# Patient Record
Sex: MALE | Race: WHITE | NOT HISPANIC OR LATINO | Employment: FULL TIME | URBAN - METROPOLITAN AREA
[De-identification: names, ages, dates, MRNs, and addresses within clinical notes are randomized per-mention and may not be internally consistent; named-entity substitution may affect disease eponyms.]

---

## 2017-04-03 ENCOUNTER — RECORDS - HEALTHEAST (OUTPATIENT)
Dept: LAB | Facility: CLINIC | Age: 37
End: 2017-04-03

## 2017-04-03 LAB
CHOLEST SERPL-MCNC: 219 MG/DL
FASTING STATUS PATIENT QL REPORTED: ABNORMAL
HDLC SERPL-MCNC: 67 MG/DL
LDLC SERPL CALC-MCNC: 137 MG/DL
TRIGL SERPL-MCNC: 76 MG/DL

## 2018-08-03 ENCOUNTER — RECORDS - HEALTHEAST (OUTPATIENT)
Dept: LAB | Facility: CLINIC | Age: 38
End: 2018-08-03

## 2018-08-03 LAB
ALBUMIN SERPL-MCNC: 4.2 G/DL (ref 3.5–5)
ALP SERPL-CCNC: 83 U/L (ref 45–120)
ALT SERPL W P-5'-P-CCNC: 95 U/L (ref 0–45)
ANION GAP SERPL CALCULATED.3IONS-SCNC: 12 MMOL/L (ref 5–18)
AST SERPL W P-5'-P-CCNC: 53 U/L (ref 0–40)
BILIRUB SERPL-MCNC: 0.4 MG/DL (ref 0–1)
BUN SERPL-MCNC: 14 MG/DL (ref 8–22)
CALCIUM SERPL-MCNC: 9.6 MG/DL (ref 8.5–10.5)
CHLORIDE BLD-SCNC: 105 MMOL/L (ref 98–107)
CHOLEST SERPL-MCNC: 250 MG/DL
CO2 SERPL-SCNC: 23 MMOL/L (ref 22–31)
CREAT SERPL-MCNC: 0.8 MG/DL (ref 0.7–1.3)
FASTING STATUS PATIENT QL REPORTED: YES
GFR SERPL CREATININE-BSD FRML MDRD: >60 ML/MIN/1.73M2
GLUCOSE BLD-MCNC: 89 MG/DL (ref 70–125)
HDLC SERPL-MCNC: 66 MG/DL
LDLC SERPL CALC-MCNC: 131 MG/DL
POTASSIUM BLD-SCNC: 4.3 MMOL/L (ref 3.5–5)
PROT SERPL-MCNC: 7.7 G/DL (ref 6–8)
SODIUM SERPL-SCNC: 140 MMOL/L (ref 136–145)
TRIGL SERPL-MCNC: 265 MG/DL

## 2020-02-17 ENCOUNTER — RECORDS - HEALTHEAST (OUTPATIENT)
Dept: LAB | Facility: CLINIC | Age: 40
End: 2020-02-17

## 2020-02-17 LAB
ALBUMIN SERPL-MCNC: 4.7 G/DL (ref 3.5–5)
ALP SERPL-CCNC: 76 U/L (ref 45–120)
ALT SERPL W P-5'-P-CCNC: 148 U/L (ref 0–45)
ANION GAP SERPL CALCULATED.3IONS-SCNC: 10 MMOL/L (ref 5–18)
AST SERPL W P-5'-P-CCNC: 97 U/L (ref 0–40)
BILIRUB SERPL-MCNC: 0.7 MG/DL (ref 0–1)
BUN SERPL-MCNC: 12 MG/DL (ref 8–22)
CALCIUM SERPL-MCNC: 10.1 MG/DL (ref 8.5–10.5)
CHLORIDE BLD-SCNC: 100 MMOL/L (ref 98–107)
CHOLEST SERPL-MCNC: 228 MG/DL
CO2 SERPL-SCNC: 30 MMOL/L (ref 22–31)
CREAT SERPL-MCNC: 0.85 MG/DL (ref 0.7–1.3)
FASTING STATUS PATIENT QL REPORTED: YES
GFR SERPL CREATININE-BSD FRML MDRD: >60 ML/MIN/1.73M2
GLUCOSE BLD-MCNC: 88 MG/DL (ref 70–125)
HDLC SERPL-MCNC: 73 MG/DL
LDLC SERPL CALC-MCNC: 125 MG/DL
POTASSIUM BLD-SCNC: 4 MMOL/L (ref 3.5–5)
PROT SERPL-MCNC: 8 G/DL (ref 6–8)
SODIUM SERPL-SCNC: 140 MMOL/L (ref 136–145)
TRIGL SERPL-MCNC: 150 MG/DL

## 2021-06-01 ENCOUNTER — RECORDS - HEALTHEAST (OUTPATIENT)
Dept: LAB | Facility: CLINIC | Age: 41
End: 2021-06-01

## 2021-06-01 LAB
ALBUMIN SERPL-MCNC: 4.3 G/DL (ref 3.5–5)
ALP SERPL-CCNC: 108 U/L (ref 45–120)
ALT SERPL W P-5'-P-CCNC: 198 U/L (ref 0–45)
ANION GAP SERPL CALCULATED.3IONS-SCNC: 13 MMOL/L (ref 5–18)
AST SERPL W P-5'-P-CCNC: 187 U/L (ref 0–40)
BILIRUB SERPL-MCNC: 0.4 MG/DL (ref 0–1)
BUN SERPL-MCNC: 9 MG/DL (ref 8–22)
CALCIUM SERPL-MCNC: 9.4 MG/DL (ref 8.5–10.5)
CHLORIDE BLD-SCNC: 101 MMOL/L (ref 98–107)
CHOLEST SERPL-MCNC: 235 MG/DL
CO2 SERPL-SCNC: 22 MMOL/L (ref 22–31)
CREAT SERPL-MCNC: 0.81 MG/DL (ref 0.7–1.3)
FASTING STATUS PATIENT QL REPORTED: ABNORMAL
GFR SERPL CREATININE-BSD FRML MDRD: >60 ML/MIN/1.73M2
GLUCOSE BLD-MCNC: 128 MG/DL (ref 70–125)
HDLC SERPL-MCNC: 85 MG/DL
LDLC SERPL CALC-MCNC: 120 MG/DL
POTASSIUM BLD-SCNC: 3.6 MMOL/L (ref 3.5–5)
PROT SERPL-MCNC: 7.5 G/DL (ref 6–8)
SODIUM SERPL-SCNC: 136 MMOL/L (ref 136–145)
TRIGL SERPL-MCNC: 148 MG/DL

## 2021-10-05 ENCOUNTER — LAB REQUISITION (OUTPATIENT)
Dept: LAB | Facility: CLINIC | Age: 41
End: 2021-10-05

## 2021-10-05 DIAGNOSIS — R07.9 CHEST PAIN, UNSPECIFIED: ICD-10-CM

## 2021-10-05 DIAGNOSIS — R10.13 EPIGASTRIC PAIN: ICD-10-CM

## 2021-10-05 LAB
ALBUMIN SERPL-MCNC: 4.7 G/DL (ref 3.5–5)
ALP SERPL-CCNC: 100 U/L (ref 45–120)
ALT SERPL W P-5'-P-CCNC: 382 U/L (ref 0–45)
ANION GAP SERPL CALCULATED.3IONS-SCNC: 15 MMOL/L (ref 5–18)
AST SERPL W P-5'-P-CCNC: 355 U/L (ref 0–40)
BILIRUB SERPL-MCNC: 0.6 MG/DL (ref 0–1)
BUN SERPL-MCNC: 9 MG/DL (ref 8–22)
CALCIUM SERPL-MCNC: 10.4 MG/DL (ref 8.5–10.5)
CHLORIDE BLD-SCNC: 93 MMOL/L (ref 98–107)
CO2 SERPL-SCNC: 25 MMOL/L (ref 22–31)
CREAT SERPL-MCNC: 0.91 MG/DL (ref 0.7–1.3)
GFR SERPL CREATININE-BSD FRML MDRD: >90 ML/MIN/1.73M2
GLUCOSE BLD-MCNC: 160 MG/DL (ref 70–125)
LIPASE SERPL-CCNC: 25 U/L (ref 0–52)
POTASSIUM BLD-SCNC: 4.4 MMOL/L (ref 3.5–5)
PROT SERPL-MCNC: 8.6 G/DL (ref 6–8)
SODIUM SERPL-SCNC: 133 MMOL/L (ref 136–145)

## 2021-10-05 PROCEDURE — 82947 ASSAY GLUCOSE BLOOD QUANT: CPT | Performed by: PHYSICIAN ASSISTANT

## 2021-10-05 PROCEDURE — 83690 ASSAY OF LIPASE: CPT | Performed by: PHYSICIAN ASSISTANT

## 2022-02-23 ENCOUNTER — HOSPITAL ENCOUNTER (INPATIENT)
Facility: HOSPITAL | Age: 42
LOS: 2 days | Discharge: HOME | End: 2022-02-25
Attending: EMERGENCY MEDICINE | Admitting: INTERNAL MEDICINE
Payer: COMMERCIAL

## 2022-02-23 ENCOUNTER — APPOINTMENT (OUTPATIENT)
Dept: RADIOLOGY | Facility: HOSPITAL | Age: 42
End: 2022-02-23
Attending: EMERGENCY MEDICINE
Payer: COMMERCIAL

## 2022-02-23 DIAGNOSIS — F10.939 ALCOHOL WITHDRAWAL SYNDROME WITH COMPLICATION (H): ICD-10-CM

## 2022-02-23 DIAGNOSIS — E87.1 HYPONATREMIA: ICD-10-CM

## 2022-02-23 DIAGNOSIS — K21.9 GASTROESOPHAGEAL REFLUX DISEASE WITHOUT ESOPHAGITIS: Primary | ICD-10-CM

## 2022-02-23 DIAGNOSIS — N17.9 ACUTE KIDNEY INJURY (H): ICD-10-CM

## 2022-02-23 LAB
ALBUMIN SERPL-MCNC: 4.3 G/DL (ref 3.5–5)
ALBUMIN UR-MCNC: NEGATIVE MG/DL
ALP SERPL-CCNC: 73 U/L (ref 45–120)
ALT SERPL W P-5'-P-CCNC: 193 U/L (ref 0–45)
ANION GAP SERPL CALCULATED.3IONS-SCNC: 17 MMOL/L (ref 5–18)
APPEARANCE UR: CLEAR
AST SERPL W P-5'-P-CCNC: 140 U/L (ref 0–40)
BILIRUB DIRECT SERPL-MCNC: 0.3 MG/DL
BILIRUB SERPL-MCNC: 0.9 MG/DL (ref 0–1)
BILIRUB UR QL STRIP: NEGATIVE
BUN SERPL-MCNC: 25 MG/DL (ref 8–22)
CALCIUM SERPL-MCNC: 9.8 MG/DL (ref 8.5–10.5)
CHLORIDE BLD-SCNC: 90 MMOL/L (ref 98–107)
CO2 SERPL-SCNC: 20 MMOL/L (ref 22–31)
COLOR UR AUTO: COLORLESS
CREAT SERPL-MCNC: 1.89 MG/DL (ref 0.7–1.3)
ERYTHROCYTE [DISTWIDTH] IN BLOOD BY AUTOMATED COUNT: 11.8 % (ref 10–15)
ETHANOL SERPL-MCNC: 69 MG/DL
GFR SERPL CREATININE-BSD FRML MDRD: 45 ML/MIN/1.73M2
GLUCOSE BLD-MCNC: 101 MG/DL (ref 70–125)
GLUCOSE UR STRIP-MCNC: NEGATIVE MG/DL
HCT VFR BLD AUTO: 41.7 % (ref 40–53)
HGB BLD-MCNC: 14.3 G/DL (ref 13.3–17.7)
HGB UR QL STRIP: NEGATIVE
KETONES UR STRIP-MCNC: ABNORMAL MG/DL
LEUKOCYTE ESTERASE UR QL STRIP: NEGATIVE
LIPASE SERPL-CCNC: 16 U/L (ref 0–52)
MAGNESIUM SERPL-MCNC: 2 MG/DL (ref 1.8–2.6)
MCH RBC QN AUTO: 31 PG (ref 26.5–33)
MCHC RBC AUTO-ENTMCNC: 34.3 G/DL (ref 31.5–36.5)
MCV RBC AUTO: 91 FL (ref 78–100)
NITRATE UR QL: NEGATIVE
PH UR STRIP: 6.5 [PH] (ref 5–7)
PHOSPHATE SERPL-MCNC: 3.6 MG/DL (ref 2.5–4.5)
PLATELET # BLD AUTO: 246 10E3/UL (ref 150–450)
POTASSIUM BLD-SCNC: 3.6 MMOL/L (ref 3.5–5)
PROT SERPL-MCNC: 8.3 G/DL (ref 6–8)
RBC # BLD AUTO: 4.61 10E6/UL (ref 4.4–5.9)
RBC URINE: 0 /HPF
SODIUM SERPL-SCNC: 127 MMOL/L (ref 136–145)
SP GR UR STRIP: 1.01 (ref 1–1.03)
TROPONIN I SERPL-MCNC: <0.01 NG/ML (ref 0–0.29)
TSH SERPL DL<=0.005 MIU/L-ACNC: 0.72 UIU/ML (ref 0.3–5)
UROBILINOGEN UR STRIP-MCNC: <2 MG/DL
WBC # BLD AUTO: 6.5 10E3/UL (ref 4–11)
WBC URINE: <1 /HPF

## 2022-02-23 PROCEDURE — 250N000011 HC RX IP 250 OP 636: Performed by: EMERGENCY MEDICINE

## 2022-02-23 PROCEDURE — 99223 1ST HOSP IP/OBS HIGH 75: CPT | Performed by: INTERNAL MEDICINE

## 2022-02-23 PROCEDURE — 258N000003 HC RX IP 258 OP 636: Performed by: INTERNAL MEDICINE

## 2022-02-23 PROCEDURE — HZ2ZZZZ DETOXIFICATION SERVICES FOR SUBSTANCE ABUSE TREATMENT: ICD-10-PCS | Performed by: INTERNAL MEDICINE

## 2022-02-23 PROCEDURE — 84100 ASSAY OF PHOSPHORUS: CPT | Performed by: INTERNAL MEDICINE

## 2022-02-23 PROCEDURE — 84484 ASSAY OF TROPONIN QUANT: CPT | Performed by: EMERGENCY MEDICINE

## 2022-02-23 PROCEDURE — 81001 URINALYSIS AUTO W/SCOPE: CPT | Performed by: EMERGENCY MEDICINE

## 2022-02-23 PROCEDURE — 96374 THER/PROPH/DIAG INJ IV PUSH: CPT

## 2022-02-23 PROCEDURE — 82248 BILIRUBIN DIRECT: CPT | Performed by: EMERGENCY MEDICINE

## 2022-02-23 PROCEDURE — 85027 COMPLETE CBC AUTOMATED: CPT | Performed by: EMERGENCY MEDICINE

## 2022-02-23 PROCEDURE — 83735 ASSAY OF MAGNESIUM: CPT | Performed by: INTERNAL MEDICINE

## 2022-02-23 PROCEDURE — 36415 COLL VENOUS BLD VENIPUNCTURE: CPT | Performed by: EMERGENCY MEDICINE

## 2022-02-23 PROCEDURE — 250N000009 HC RX 250: Performed by: EMERGENCY MEDICINE

## 2022-02-23 PROCEDURE — 120N000001 HC R&B MED SURG/OB

## 2022-02-23 PROCEDURE — 82077 ASSAY SPEC XCP UR&BREATH IA: CPT | Performed by: EMERGENCY MEDICINE

## 2022-02-23 PROCEDURE — 258N000003 HC RX IP 258 OP 636: Performed by: EMERGENCY MEDICINE

## 2022-02-23 PROCEDURE — 96375 TX/PRO/DX INJ NEW DRUG ADDON: CPT

## 2022-02-23 PROCEDURE — 71046 X-RAY EXAM CHEST 2 VIEWS: CPT

## 2022-02-23 PROCEDURE — 83690 ASSAY OF LIPASE: CPT | Performed by: EMERGENCY MEDICINE

## 2022-02-23 PROCEDURE — 250N000013 HC RX MED GY IP 250 OP 250 PS 637: Performed by: EMERGENCY MEDICINE

## 2022-02-23 PROCEDURE — 250N000013 HC RX MED GY IP 250 OP 250 PS 637: Performed by: INTERNAL MEDICINE

## 2022-02-23 PROCEDURE — 93005 ELECTROCARDIOGRAM TRACING: CPT | Performed by: EMERGENCY MEDICINE

## 2022-02-23 PROCEDURE — 84443 ASSAY THYROID STIM HORMONE: CPT | Performed by: EMERGENCY MEDICINE

## 2022-02-23 PROCEDURE — C9803 HOPD COVID-19 SPEC COLLECT: HCPCS

## 2022-02-23 PROCEDURE — 99285 EMERGENCY DEPT VISIT HI MDM: CPT | Mod: 25

## 2022-02-23 PROCEDURE — 96361 HYDRATE IV INFUSION ADD-ON: CPT

## 2022-02-23 RX ORDER — ATORVASTATIN CALCIUM 40 MG/1
40 TABLET, FILM COATED ORAL DAILY
Status: DISCONTINUED | OUTPATIENT
Start: 2022-02-24 | End: 2022-02-25 | Stop reason: HOSPADM

## 2022-02-23 RX ORDER — FLUMAZENIL 0.1 MG/ML
0.2 INJECTION, SOLUTION INTRAVENOUS
Status: DISCONTINUED | OUTPATIENT
Start: 2022-02-23 | End: 2022-02-25 | Stop reason: HOSPADM

## 2022-02-23 RX ORDER — FOLIC ACID 1 MG/1
1 TABLET ORAL DAILY
Status: DISCONTINUED | OUTPATIENT
Start: 2022-02-24 | End: 2022-02-25 | Stop reason: HOSPADM

## 2022-02-23 RX ORDER — LORAZEPAM 2 MG/ML
1 INJECTION INTRAMUSCULAR ONCE
Status: COMPLETED | OUTPATIENT
Start: 2022-02-23 | End: 2022-02-23

## 2022-02-23 RX ORDER — LORAZEPAM 2 MG/ML
1-2 INJECTION INTRAMUSCULAR EVERY 30 MIN PRN
Status: DISCONTINUED | OUTPATIENT
Start: 2022-02-23 | End: 2022-02-25 | Stop reason: HOSPADM

## 2022-02-23 RX ORDER — ACETAMINOPHEN 650 MG/1
650 SUPPOSITORY RECTAL EVERY 6 HOURS PRN
Status: DISCONTINUED | OUTPATIENT
Start: 2022-02-23 | End: 2022-02-25 | Stop reason: HOSPADM

## 2022-02-23 RX ORDER — ONDANSETRON 2 MG/ML
4 INJECTION INTRAMUSCULAR; INTRAVENOUS EVERY 6 HOURS PRN
Status: DISCONTINUED | OUTPATIENT
Start: 2022-02-23 | End: 2022-02-25 | Stop reason: HOSPADM

## 2022-02-23 RX ORDER — GABAPENTIN 300 MG/1
900 CAPSULE ORAL EVERY 8 HOURS
Status: DISCONTINUED | OUTPATIENT
Start: 2022-02-24 | End: 2022-02-25 | Stop reason: HOSPADM

## 2022-02-23 RX ORDER — LIDOCAINE 40 MG/G
CREAM TOPICAL
Status: DISCONTINUED | OUTPATIENT
Start: 2022-02-23 | End: 2022-02-25 | Stop reason: HOSPADM

## 2022-02-23 RX ORDER — CLONIDINE HYDROCHLORIDE 0.1 MG/1
0.1 TABLET ORAL EVERY 8 HOURS
Status: DISCONTINUED | OUTPATIENT
Start: 2022-02-23 | End: 2022-02-25 | Stop reason: HOSPADM

## 2022-02-23 RX ORDER — OLANZAPINE 5 MG/1
5-10 TABLET, ORALLY DISINTEGRATING ORAL EVERY 6 HOURS PRN
Status: DISCONTINUED | OUTPATIENT
Start: 2022-02-23 | End: 2022-02-25 | Stop reason: HOSPADM

## 2022-02-23 RX ORDER — FOLIC ACID 1 MG/1
1 TABLET ORAL ONCE
Status: COMPLETED | OUTPATIENT
Start: 2022-02-23 | End: 2022-02-23

## 2022-02-23 RX ORDER — LORAZEPAM 1 MG/1
1-2 TABLET ORAL EVERY 30 MIN PRN
Status: DISCONTINUED | OUTPATIENT
Start: 2022-02-23 | End: 2022-02-25 | Stop reason: HOSPADM

## 2022-02-23 RX ORDER — GABAPENTIN 300 MG/1
1200 CAPSULE ORAL ONCE
Status: COMPLETED | OUTPATIENT
Start: 2022-02-23 | End: 2022-02-23

## 2022-02-23 RX ORDER — ONDANSETRON 4 MG/1
4 TABLET, ORALLY DISINTEGRATING ORAL ONCE
Status: COMPLETED | OUTPATIENT
Start: 2022-02-23 | End: 2022-02-23

## 2022-02-23 RX ORDER — HALOPERIDOL 5 MG/ML
2.5-5 INJECTION INTRAMUSCULAR EVERY 6 HOURS PRN
Status: DISCONTINUED | OUTPATIENT
Start: 2022-02-23 | End: 2022-02-25 | Stop reason: HOSPADM

## 2022-02-23 RX ORDER — SODIUM CHLORIDE 9 MG/ML
INJECTION, SOLUTION INTRAVENOUS CONTINUOUS
Status: ACTIVE | OUTPATIENT
Start: 2022-02-23 | End: 2022-02-24

## 2022-02-23 RX ORDER — GABAPENTIN 300 MG/1
600 CAPSULE ORAL EVERY 8 HOURS
Status: DISCONTINUED | OUTPATIENT
Start: 2022-02-27 | End: 2022-02-25 | Stop reason: HOSPADM

## 2022-02-23 RX ORDER — MULTIPLE VITAMINS W/ MINERALS TAB 9MG-400MCG
1 TAB ORAL DAILY
Status: DISCONTINUED | OUTPATIENT
Start: 2022-02-23 | End: 2022-02-25 | Stop reason: HOSPADM

## 2022-02-23 RX ORDER — GABAPENTIN 100 MG/1
100 CAPSULE ORAL EVERY 8 HOURS
Status: DISCONTINUED | OUTPATIENT
Start: 2022-03-03 | End: 2022-02-25 | Stop reason: HOSPADM

## 2022-02-23 RX ORDER — LORAZEPAM 0.5 MG/1
1 TABLET ORAL ONCE
Status: COMPLETED | OUTPATIENT
Start: 2022-02-23 | End: 2022-02-23

## 2022-02-23 RX ORDER — GABAPENTIN 300 MG/1
300 CAPSULE ORAL EVERY 8 HOURS
Status: DISCONTINUED | OUTPATIENT
Start: 2022-03-01 | End: 2022-02-25 | Stop reason: HOSPADM

## 2022-02-23 RX ORDER — ONDANSETRON 4 MG/1
4 TABLET, ORALLY DISINTEGRATING ORAL EVERY 6 HOURS PRN
Status: DISCONTINUED | OUTPATIENT
Start: 2022-02-23 | End: 2022-02-25 | Stop reason: HOSPADM

## 2022-02-23 RX ORDER — ACETAMINOPHEN 325 MG/1
650 TABLET ORAL EVERY 6 HOURS PRN
Status: DISCONTINUED | OUTPATIENT
Start: 2022-02-23 | End: 2022-02-25 | Stop reason: HOSPADM

## 2022-02-23 RX ORDER — ONDANSETRON 4 MG/1
4 TABLET, FILM COATED ORAL EVERY 6 HOURS PRN
COMMUNITY
End: 2024-07-24

## 2022-02-23 RX ADMIN — Medication 100 MG: at 15:43

## 2022-02-23 RX ADMIN — GABAPENTIN 1200 MG: 300 CAPSULE ORAL at 18:59

## 2022-02-23 RX ADMIN — SODIUM CHLORIDE 1000 ML: 9 INJECTION, SOLUTION INTRAVENOUS at 13:35

## 2022-02-23 RX ADMIN — LORAZEPAM 1 MG: 0.5 TABLET ORAL at 15:43

## 2022-02-23 RX ADMIN — FOLIC ACID 1 MG: 1 TABLET ORAL at 15:43

## 2022-02-23 RX ADMIN — ONDANSETRON 4 MG: 4 TABLET, ORALLY DISINTEGRATING ORAL at 13:31

## 2022-02-23 RX ADMIN — CLONIDINE HYDROCHLORIDE 0.1 MG: 0.1 TABLET ORAL at 18:58

## 2022-02-23 RX ADMIN — FAMOTIDINE 20 MG: 10 INJECTION, SOLUTION INTRAVENOUS at 13:28

## 2022-02-23 RX ADMIN — SODIUM CHLORIDE: 9 INJECTION, SOLUTION INTRAVENOUS at 18:11

## 2022-02-23 RX ADMIN — LORAZEPAM 1 MG: 2 INJECTION INTRAMUSCULAR; INTRAVENOUS at 16:43

## 2022-02-23 RX ADMIN — Medication 1 TABLET: at 18:58

## 2022-02-23 ASSESSMENT — ACTIVITIES OF DAILY LIVING (ADL)
ADLS_ACUITY_SCORE: 4
DEPENDENT_IADLS:: INDEPENDENT
ADLS_ACUITY_SCORE: 8
ADLS_ACUITY_SCORE: 4
ADLS_ACUITY_SCORE: 8

## 2022-02-23 NOTE — ED PROVIDER NOTES
ED Triage Provider Note  [facility]  Encounter Date: Feb 23, 2022    History:  Chief Complaint   Patient presents with     Nausea, Vomiting, & Diarrhea     Chandler Branch is a 41 year old male who presents to the ED with nausea, vomiting. Was seen at , sent here with MARY, hyponatremia. Give IVF, zofran. Mild acid reflux pain. etoh 2 days ago, does not feel like in withdrawals.       Review of Systems:  See HPI    Exam:  /71   Pulse (!) 124   Temp 97.7  F (36.5  C) (Temporal)   Resp 16   Wt 93 kg (205 lb)   SpO2 99%   BMI 27.05 kg/m    General: No acute distress. Appears stated age. Anxious, speaks rapidly  Cardio: Tachycardic rate, extremities well perfused  Resp: Normal work of breathing, grossly normal respiratory rate  Neuro: Alert. CN II-XII grossly intact. Grossly intact strength.       Medical Decision Making:  Patient arriving to the ED with problem as above. A medical screening exam was performed. Orders initiated from Triage. The patient is appropriate to wait in triage.    Initial DDx includes: MARY, gastroenteritis, etoh gastritis, etoh withdrawal, hyperthyroidism    Yuniel Seth MD on 2/23/2022 at 12:47 PM     Yuniel Seth MD  02/23/22 9867

## 2022-02-23 NOTE — ED TRIAGE NOTES
Pt c/o nausea and vomiting for past 3 days. Not able to eat food. Dizziness. Some shortness of breath (negative COVID test at urgency room) Pt has IV placed from urgency room, fluids, 4mg of zofran.

## 2022-02-23 NOTE — ED PROVIDER NOTES
EMERGENCY DEPARTMENT ENCOUNTER      NAME: Chandler Branch  AGE: 41 year old male  YOB: 1980  MRN: [id number]  EVALUATION DATE & TIME: No admission date for patient encounter.    PCP: Mame Dougherty    ED PROVIDER: Renay Otero M.D.      Chief Complaint   Patient presents with     Nausea, Vomiting, & Diarrhea         FINAL IMPRESSION:  1. Hyponatremia    2. Acute kidney injury (H)    3. Alcohol withdrawal syndrome with complication (H)          ED COURSE & MEDICAL DECISION MAKING:    ED Course as of 02/23/22 1528   Wed Feb 23, 2022   1512 Pt with creatinine improving from 2.7 to 1.89 after 2L NS bolus and Na from 120 to 127, LFTs elevated with Tbili normal 0.9 with likely EtOH related stress. EKG completed and reassuringly WNL without ST changes, no prior available for comparison purposes and with COVID19 PCR underway. CXR pending, hospitalist paged to admit with hyponatremia and MARY with EtOH and epigastric pain/gastritis, pt received zofran/pepcid here in ED, IV ativan with elevated BP, tremulousness and some tachycardia with early EtOH withdrawal likely   1527 Pt endorsed to hospitalist Dr Jones to med surg       Pertinent Labs & Imaging studies reviewed. (See chart for details)  2:37 PM I met with patient for initial interview and encounter.   3:25 PM I spoke to hospitalist Dr. Jones regarding plan for admission.    N95 worn      At the conclusion of the encounter I discussed the results of all of the tests and the disposition. The questions were answered. The patient or family acknowledged understanding and was agreeable with the care plan.     MEDICATIONS GIVEN IN THE EMERGENCY:  Medications   thiamine (B-1) tablet 100 mg (has no administration in time range)   folic acid (FOLVITE) tablet 1 mg (has no administration in time range)   LORazepam (ATIVAN) tablet 1 mg (has no administration in time range)   LORazepam (ATIVAN) injection 1 mg (has no administration in time range)   0.9% sodium chloride  BOLUS (1,000 mLs Intravenous New Bag 2/23/22 1335)   ondansetron (ZOFRAN-ODT) ODT tab 4 mg (4 mg Oral Given 2/23/22 1331)   famotidine (PEPCID) injection 20 mg (20 mg Intravenous Given 2/23/22 1328)       NEW PRESCRIPTIONS STARTED AT TODAY'S ER VISIT  New Prescriptions    No medications on file          =================================================================    HPI      Chandler Branch is a 41 year old male with PMHx of HTN, HLD, alcohol use, former smoker who presents to the ED today via walk-in with nausea, vomiting.    Per chart review, patient was seen at [facility] earlier today for vomiting x2 days. Laboratory work was concerning with evidence for acute kidney injury and hyponatremia compared to lab results reviewed from December of 2020. He was referred to this ED for IV hydration and possible admission.     Patient reports he has had 3 days of intermittent epigastric and substernal burning chest pain. States that he has been lightheaded with this, and had one episode of non-bloody vomiting 2 days ago. He denies any significant cardiac history or history of DVT. He was found to have acute MARY and hyponatremia at  earlier today and was sent to this ED due to lack of direct admit beds.       REVIEW OF SYSTEMS   All other systems reviewed and are negative except as noted above in HPI.    PAST MEDICAL HISTORY:  History reviewed. No pertinent past medical history.    PAST SURGICAL HISTORY:  Past Surgical History:   Procedure Laterality Date     BACK SURGERY       IR CERVICAL EPIDURAL STEROID INJECTION  4/21/2016       CURRENT MEDICATIONS:    acetaminophen (TYLENOL) 325 MG tablet  atorvastatin (LIPITOR) 40 MG tablet  HYDROXYZINE HCL ORAL  lisinopril-hydrochlorothiazide (PRINZIDE,ZESTORETIC) 10-12.5 mg per tablet  naproxen (NAPROSYN) 250 MG tablet  traMADol (ULTRAM) 50 mg tablet        ALLERGIES:  No Known Allergies    FAMILY HISTORY:  Family History   Problem Relation Age of Onset     Coronary  Artery Disease Paternal Grandfather        SOCIAL HISTORY:   Social History     Socioeconomic History     Marital status: Single     Spouse name: Not on file     Number of children: Not on file     Years of education: Not on file     Highest education level: Not on file   Occupational History     Not on file   Tobacco Use     Smoking status: Never Smoker     Smokeless tobacco: Not on file   Substance and Sexual Activity     Alcohol use: Yes     Alcohol/week: 7.0 standard drinks     Comment: Alcoholic Drinks/day: One drink a day     Drug use: Not on file     Sexual activity: Not on file   Other Topics Concern     Not on file   Social History Narrative     Not on file     Social Determinants of Health     Financial Resource Strain: Not on file   Food Insecurity: Not on file   Transportation Needs: Not on file   Physical Activity: Not on file   Stress: Not on file   Social Connections: Not on file   Intimate Partner Violence: Not on file   Housing Stability: Not on file       VITALS:  Patient Vitals for the past 24 hrs:   BP Temp Temp src Pulse Resp SpO2 Weight   02/23/22 1430 (!) 140/70 -- -- -- -- 100 % --   02/23/22 1246 -- -- -- -- -- -- 93 kg (205 lb)   02/23/22 1241 132/71 97.7  F (36.5  C) Temporal (!) 124 16 99 % --       PHYSICAL EXAM    GENERAL: Awake, alert.  In no acute distress.   HEENT: Normocephalic, atraumatic.  Pupils equal, round and reactive.  Conjunctiva normal.  EOMI.  NECK: No stridor or apparent deformity.  PULMONARY: Symmetrical breath sounds without distress.  Lungs clear to auscultation bilaterally without wheezes, rhonchi or rales.  CARDIO: Regular rate and rhythm.  No significant murmur, rub or gallop.  Radial pulses strong and symmetrical.  ABDOMINAL: Abdomen soft, non-distended and non-tender to palpation.  No CVAT, no palpable hepatosplenomegaly.  EXTREMITIES: No lower extremity swelling or edema.    NEURO: Alert and oriented to person, place and time.  Cranial nerves grossly intact.  No  focal motor deficit.  PSYCH: Normal mood and affect  SKIN: No rashes      LAB:  All pertinent labs reviewed and interpreted.  Results for orders placed or performed during the hospital encounter of 02/23/22   UA with Microscopic reflex to Culture    Specimen: Urine, Clean Catch   Result Value Ref Range    Color Urine Colorless Colorless, Straw, Light Yellow, Yellow    Appearance Urine Clear Clear    Glucose Urine Negative Negative mg/dL    Bilirubin Urine Negative Negative    Ketones Urine Trace (A) Negative mg/dL    Specific Gravity Urine 1.010 1.001 - 1.030    Blood Urine Negative Negative    pH Urine 6.5 5.0 - 7.0    Protein Albumin Urine Negative Negative mg/dL    Urobilinogen Urine <2.0 <2.0 mg/dL    Nitrite Urine Negative Negative    Leukocyte Esterase Urine Negative Negative    RBC Urine 0 <=2 /HPF    WBC Urine <1 <=5 /HPF   TSH with free T4 reflex   Result Value Ref Range    TSH 0.72 0.30 - 5.00 uIU/mL   Result Value Ref Range    Lipase 16 0 - 52 U/L   Hepatic function panel   Result Value Ref Range    Bilirubin Total 0.9 0.0 - 1.0 mg/dL    Bilirubin Direct 0.3 <=0.5 mg/dL    Protein Total 8.3 (H) 6.0 - 8.0 g/dL    Albumin 4.3 3.5 - 5.0 g/dL    Alkaline Phosphatase 73 45 - 120 U/L     (H) 0 - 40 U/L     (H) 0 - 45 U/L   CBC (+ platelets, no diff)   Result Value Ref Range    WBC Count 6.5 4.0 - 11.0 10e3/uL    RBC Count 4.61 4.40 - 5.90 10e6/uL    Hemoglobin 14.3 13.3 - 17.7 g/dL    Hematocrit 41.7 40.0 - 53.0 %    MCV 91 78 - 100 fL    MCH 31.0 26.5 - 33.0 pg    MCHC 34.3 31.5 - 36.5 g/dL    RDW 11.8 10.0 - 15.0 %    Platelet Count 246 150 - 450 10e3/uL   Basic metabolic panel   Result Value Ref Range    Sodium 127 (L) 136 - 145 mmol/L    Potassium 3.6 3.5 - 5.0 mmol/L    Chloride 90 (L) 98 - 107 mmol/L    Carbon Dioxide (CO2) 20 (L) 22 - 31 mmol/L    Anion Gap 17 5 - 18 mmol/L    Urea Nitrogen 25 (H) 8 - 22 mg/dL    Creatinine 1.89 (H) 0.70 - 1.30 mg/dL    Calcium 9.8 8.5 - 10.5 mg/dL     Glucose 101 70 - 125 mg/dL    GFR Estimate 45 (L) >60 mL/min/1.73m2   Result Value Ref Range    Troponin I <0.01 0.00 - 0.29 ng/mL       RADIOLOGY:  Reviewed all pertinent imaging. Please see official radiology report.  XR Chest 2 Views    (Results Pending)         EKG:    Reviewed and interpreted as:   2/23/2022 1454  96 bpm  Normal sinus rhythm  No STEMI  Normal EGC      I have independently reviewed and interpreted the EKG(s) documented above.        I, Brandin Vincent, am serving as a scribe to document services personally performed by Dr. Renay Otero based on my observation and the provider's statements to me. I, Renay Otero MD attest that Brandin Vincent is acting in a scribe capacity, has observed my performance of the services and has documented them in accordance with my direction.         Renay Otero MD  02/23/22 0830

## 2022-02-23 NOTE — H&P
Admission History and Physical   Chandler Branch,  1980, MRN [id number]      Alcohol withdrawal syndrome with complication (H) [F10.820]    PCP: Mame Dougherty, [unfilled], [phone]   Code status:  No Order       Extended Emergency Contact Information  Primary Emergency Contact: Andrés Branch  Address: [facility] [address] [place] [address]  Home Phone: [phone]  Relation: Father  Secondary Emergency Contact: Erwin Branch   [place]  Mobile Phone: [phone]  Relation: Other  Mother: BERNADINE BRANCH  Home Phone: [phone]       Assessment and Plan     Assessment:  41-year-old with untreated GERD who had alcohol and dietary indiscretion presented with nausea vomiting.  He was unable to take p.o. fluids or food.  Labs in the emergency room showed hyponatremia and MARY.  Patient is feeling better after IV fluids in the ER in the urgency room.    Acute hyponatremia likely due to hypovolemia from nausea and vomiting  --Initially presented with sodium level of 121 in the urgency room  --Improved to 127 after IV fluids  --Continue normal saline at 75 cc an hour    MARY  --Nonoliguric  --Initial creatinine greater than 2 in the urgency room.  Repeat creatinine of 1.8  --Hold ACE inhibitor or other nephrotoxic substances  --Continue gentle IV hydration    Nausea and vomiting likely due to untreated GERD worsened with alcohol intake  --Order IV PPI  --Order gentle IV hydration and clear liquid diet.  Advance as tolerated    Alcohol use  -Blood alcohol level of 69  --Order CIWA protocol    Elevated LFT likely due to alcohol intake  --Anticipate trending down with IV fluids    Heartburn  --No evidence of ACS  --Anticipate improvement with PPI.  Discharged on PPI      Plan:    Pt would be admitted as inpatient and will likely stay for greater than 2 midnights.    Precautions: Alcohol withdrawal    Nutrition: Clear liquid and advance as tolerated    Activity: As tolerated    IV fluids: Normal  saline at 75    Antibiotics: None    DVT prophylaxis: None    GI prophylaxis: PPI    Consult: None    Monitor: Alcohol withdrawal    Labs: Magnesium, sodium and creatinine    Imaging: None      Total unit/floor time 70 minutes. Greater than 50% was spent in counseling with the patient on discussions regarding GERD.      Chief Complaint:  Nausea vomiting, heartburn 3 days     HPI:    Informant is patient reliable  Chandler Branch is a 41 year old old male with history of hypertension on lisinopril, GERD untreated with PPI which has been going on for 1 year.  Patient had dietary indiscretion with friends on Saturday with 4 already drinks.  On Monday he started having nausea and vomiting and is unable to keep anything down.  He claims that in couple of days he had 7 bouts of nonbloody emesis.  He also had a heartburn.  He complained of left quadrant upper abdominal pain which was 5 out of 10 in severity.  He was not able to have a bowel movement while passing gas.  He was no problem with urination.  He was seen emergency room and was diagnosed with hyponatremia and MARY and sent to ER.  He received 1 L of IV fluids in the emergency room and 1 L of fluid in the ER.  Patient is feeling significantly better.  Patient claims that he has had problems about a year ago but has been decreasing his alcohol intake.  His father would like his liver function to be evaluated due to past alcohol intake.  Patient claims that on the weekends he drinks 3-4 hard drinks but on the weekdays 1 or 2/day.  He has not had about alcohol withdrawal tremors or seizures.  Patient takes Pepto-Bismol as needed for acid reflux.  He works as a  for [employer] and the .                     Medical History      As mentioned above   Family History  Reviewed, and family history includes Coronary Artery Disease in his paternal grandfather.          Allergies  No Known Allergies Social History  Reviewed, and he  reports that he has never  smoked. He does not have any smokeless tobacco history on file. He reports current alcohol use of about 7.0 standard drinks of alcohol per week.    Review of Systems:  10-point ROS negative, except as noted in HPI            Prior to Admission Medications   (Not in a hospital admission)         Physical Exam:  Temp:  [97.7  F (36.5  C)] 97.7  F (36.5  C)  Pulse:  [111-124] 111  Resp:  [16] 16  BP: (132-145)/(70-79) 145/79  SpO2:  [98 %-100 %] 98 %  Wt Readings from Last 5 Encounters:   02/23/22 93 kg (205 lb)     Body mass index is 27.05 kg/m .  Alert, oriented*3  Appears anxious  No pallor, icterus, clubbing, cyanosis  Well-nourished  No sinus tenderness  Moist mucus membranes  Neck supple, but thin  CVS: S1 S2-N, no murmurs, gallops, rubs  Resp: B/L vesicular breath sounds, no wheezing, crackles   Abd: soft, No t/g/r  Neuro: No tremors of the outstretched hand   Vasc: no leg edema  No clubbing  Skin--no generalized skin rash     Pertinent Labs  Lab Results:  Recent Results (from the past 24 hour(s))   COVID-19 VIRUS (CORONAVIRUS) BY PCR (EXTERNAL RESULT)    Collection Time: 02/23/22 10:57 AM   Result Value Ref Range    COVID-19 Virus by PCR (External Result) Negative Negative   TSH with free T4 reflex    Collection Time: 02/23/22  1:28 PM   Result Value Ref Range    TSH 0.72 0.30 - 5.00 uIU/mL   Alcohol level blood    Collection Time: 02/23/22  1:28 PM   Result Value Ref Range    Alcohol, Blood 69 (H) None detected mg/dL   Lipase    Collection Time: 02/23/22  1:28 PM   Result Value Ref Range    Lipase 16 0 - 52 U/L   Hepatic function panel    Collection Time: 02/23/22  1:28 PM   Result Value Ref Range    Bilirubin Total 0.9 0.0 - 1.0 mg/dL    Bilirubin Direct 0.3 <=0.5 mg/dL    Protein Total 8.3 (H) 6.0 - 8.0 g/dL    Albumin 4.3 3.5 - 5.0 g/dL    Alkaline Phosphatase 73 45 - 120 U/L     (H) 0 - 40 U/L     (H) 0 - 45 U/L   CBC (+ platelets, no diff)    Collection Time: 02/23/22  1:28 PM   Result Value  Ref Range    WBC Count 6.5 4.0 - 11.0 10e3/uL    RBC Count 4.61 4.40 - 5.90 10e6/uL    Hemoglobin 14.3 13.3 - 17.7 g/dL    Hematocrit 41.7 40.0 - 53.0 %    MCV 91 78 - 100 fL    MCH 31.0 26.5 - 33.0 pg    MCHC 34.3 31.5 - 36.5 g/dL    RDW 11.8 10.0 - 15.0 %    Platelet Count 246 150 - 450 10e3/uL   Basic metabolic panel    Collection Time: 02/23/22  1:28 PM   Result Value Ref Range    Sodium 127 (L) 136 - 145 mmol/L    Potassium 3.6 3.5 - 5.0 mmol/L    Chloride 90 (L) 98 - 107 mmol/L    Carbon Dioxide (CO2) 20 (L) 22 - 31 mmol/L    Anion Gap 17 5 - 18 mmol/L    Urea Nitrogen 25 (H) 8 - 22 mg/dL    Creatinine 1.89 (H) 0.70 - 1.30 mg/dL    Calcium 9.8 8.5 - 10.5 mg/dL    Glucose 101 70 - 125 mg/dL    GFR Estimate 45 (L) >60 mL/min/1.73m2   Troponin I    Collection Time: 02/23/22  1:28 PM   Result Value Ref Range    Troponin I <0.01 0.00 - 0.29 ng/mL   UA with Microscopic reflex to Culture    Collection Time: 02/23/22  1:35 PM    Specimen: Urine, Clean Catch   Result Value Ref Range    Color Urine Colorless Colorless, Straw, Light Yellow, Yellow    Appearance Urine Clear Clear    Glucose Urine Negative Negative mg/dL    Bilirubin Urine Negative Negative    Ketones Urine Trace (A) Negative mg/dL    Specific Gravity Urine 1.010 1.001 - 1.030    Blood Urine Negative Negative    pH Urine 6.5 5.0 - 7.0    Protein Albumin Urine Negative Negative mg/dL    Urobilinogen Urine <2.0 <2.0 mg/dL    Nitrite Urine Negative Negative    Leukocyte Esterase Urine Negative Negative    RBC Urine 0 <=2 /HPF    WBC Urine <1 <=5 /HPF     No results found for: HGBA1C  No results found for: IRON  Lab Results   Component Value Date    TROPONINI <0.01 02/23/2022     Lab Results   Component Value Date    TSH 0.72 02/23/2022       Pertinent Radiology  Radiology Results:  XR Chest 2 Views    Result Date: 2/23/2022  EXAM: XR CHEST 2 VW LOCATION: [facility] DATE/TIME: 2/23/2022 3:25 PM INDICATION: chest pain COMPARISON:  08/23/2004     IMPRESSION: Cervical fusion hardware. Heart size appears normal. Lungs appear clear.      EKG Results: not reviewed.   Echo: No results found.

## 2022-02-23 NOTE — CONSULTS
"Care Management Initial Consult    General Information  Assessment completed with: Chandler Hansen  Type of CM/SW Visit: Initial Assessment    Primary Care Provider verified and updated as needed: Yes   Readmission within the last 30 days: no previous admission in last 30 days      Reason for Consult: discharge planning  Advance Care Planning: Advance Care Planning Reviewed: other (see comments) (\"I don't have one\")          Communication Assessment  Patient's communication style: spoken language (English or Bilingual)    Hearing Difficulty or Deaf: no   Wear Glasses or Blind: yes    Cognitive  Cognitive/Neuro/Behavioral:                        Living Environment:   People in home: child(dorina), dependent (\"kids: daughter 13, twins: daughter 10 and son 10.\")     Current living Arrangements: house      Able to return to prior arrangements: yes       Family/Social Support:  Care provided by: self  Provides care for: child(dorina) (\"kids: daughter 13, twins: daughter 10 and son 10.\")     Parent(s) (\"father\")          Description of Support System: Supportive, Involved    Support Assessment: Adequate family and caregiver support, Adequate social supports, Patient communicates needs well met    Current Resources:   Patient receiving home care services: No     Community Resources: None  Equipment currently used at home: none  Supplies currently used at home: Other (\"glasses for driving or the TV\")    Employment/Financial:  Employment Status: employed full-time, employed part-time (\"I have a full time job and part time job\")     Employment/ Comments: \"I still work at the [employer] and for the [employer]\"  Financial Concerns:             Lifestyle & Psychosocial Needs:  Social Determinants of Health     Tobacco Use: Unknown     Smoking Tobacco Use: Never Smoker     Smokeless Tobacco Use: Unknown   Alcohol Use: Not on file   Financial Resource Strain: Not on file   Food Insecurity: Not on file   Transportation Needs: Not on file " "  Physical Activity: Not on file   Stress: Not on file   Social Connections: Not on file   Intimate Partner Violence: Not on file   Depression: Not on file   Housing Stability: Not on file       Functional Status:  Prior to admission patient needed assistance:   Dependent ADLs:: Independent, Ambulation-no assistive device  Dependent IADLs:: Independent  Assesssment of Functional Status: At functional baseline    Mental Health Status:          Chemical Dependency Status:  Chemical Dependency Status: Current Concern             Values/Beliefs:  Spiritual, Cultural Beliefs, Uatsdin Practices, Values that affect care:                 Additional Information:  Chandler lives in a house with his kids. His kids are: daughter 13, twins: daughter 10 and son 10. His \"father is staying with the kids while he is in the hospital\".    He is independent with ADLs at baseline and drives. He works 2 jobs, 1 full time and 1 part time. He works at the [employer] and as a . He tells me \"he is not using his VA insurance\".    Likely no discharge needs at this time.    Plans to drive himself home. His car is in the parking lot.    Sahra Gaona RN      "

## 2022-02-23 NOTE — ED NOTES
[facility] ED Handoff Report    ED Chief Complaint: nausea/vomiting    ED Diagnosis:  (E87.1) Hyponatremia  Comment: The patient came to the ER for frequent vomiting with discomfort/pain in the upper left quadrant. Now, the patient feels much better--no nausea or vomiting. VSS  Plan: Observation     (N17.9) Acute kidney injury (H)  Comment: Monitor  Plan: Monitor    (F10.239) Alcohol withdrawal syndrome with complication (H)  Comment: The patient's ETOH level at 1328 was 69. The patient shows no signs of ETOH withdrawal.   Plan: monitor for withdrawal.        PMH:  History reviewed. No pertinent past medical history.     Code Status:  No Order     Falls Risk: No Band: Not applicable    Current Living Situation/Residence: lives in a house     Elimination Status: Continent: Yes     Activity Level: Independent    Patients Preferred Language:  English     Needed: No    Vital Signs:  BP (!) 145/79   Pulse 111   Temp 97.7  F (36.5  C) (Temporal)   Resp 16   Wt 93 kg (205 lb)   SpO2 98%   BMI 27.05 kg/m       Cardiac Rhythm: regular    Pain Score: 1/10    Is the Patient Confused:  No    Last Food or Drink: 02/23/22 at   Today at 1000 hours    Focused Assessment:  The patient states his abdominal discomfort/pain is nearly resolved. The patient has a history of ETOH abuse and states his last drink was 2 days ago.     Tests Performed: Done: Labs    Treatments Provided:  Fluid and ativan    Family Dynamics/Concerns: No    Family Updated On Visitor Policy: Yes    Plan of Care Communicated to Family: Yes    Who Was Updated about Plan of Care: The patient's father    Belongings Checklist Done and Signed by Patient: Yes    Medications sent with patient: N/A    Covid: asymptomatic , negative    Additional Information: The patient is calm and cooperative. The patient states his last drink of alcohol was two days ago.     RN: [id number] Turner 2/23/2022 4:47 PM

## 2022-02-23 NOTE — ED NOTES
Expected Patient Referral to ED  11:31 AM    Referring Clinic/Provider:  Dr. August urgency room [facility]    Reason for referral/Clinical facts:  Patient presenting to the urgency room with intestinal illness type symptoms.  Was found to be hyponatremic and in acute renal failure.  Attempts were made to directly admit the patient but due to capacity issues at the hospital that was unsuccessful and the patient was referred to the emergency department for additional stabilization pending admission and bed availability.    Recommendations provided:  Send to ED for further evaluation    Caller was informed that this institution does possess the capabilities and/or resources to provide for patient and should be transferred to our facility.    Discussed that if direct admit is sought and any hurdles are encountered, this ED would be happy to see the patient and evaluate.    Informed caller that recommendations provided are recommendations based only on the facts provided and that they responsible to accept or reject the advice, or to seek a formal in person consultation as needed and that this ED will see/treat patient should they arrive.      Jhoan Ambrosio MD  Emergency Medicine  [facility] EMERGENCY DEPARTMENT  [address] [place] [id number] [phone]     Jhoan Ambrosio MD  02/23/22 5991

## 2022-02-24 ENCOUNTER — APPOINTMENT (OUTPATIENT)
Dept: ULTRASOUND IMAGING | Facility: HOSPITAL | Age: 42
End: 2022-02-24
Attending: INTERNAL MEDICINE
Payer: COMMERCIAL

## 2022-02-24 LAB
ALBUMIN SERPL-MCNC: 3.8 G/DL (ref 3.5–5)
ALP SERPL-CCNC: 61 U/L (ref 45–120)
ALT SERPL W P-5'-P-CCNC: 201 U/L (ref 0–45)
ANION GAP SERPL CALCULATED.3IONS-SCNC: 11 MMOL/L (ref 5–18)
AST SERPL W P-5'-P-CCNC: 190 U/L (ref 0–40)
ATRIAL RATE - MUSE: 96 BPM
BILIRUB SERPL-MCNC: 1.2 MG/DL (ref 0–1)
BUN SERPL-MCNC: 18 MG/DL (ref 8–22)
CALCIUM SERPL-MCNC: 9.2 MG/DL (ref 8.5–10.5)
CHLORIDE BLD-SCNC: 98 MMOL/L (ref 98–107)
CO2 SERPL-SCNC: 23 MMOL/L (ref 22–31)
CREAT SERPL-MCNC: 1.17 MG/DL (ref 0.7–1.3)
DIASTOLIC BLOOD PRESSURE - MUSE: NORMAL MMHG
GFR SERPL CREATININE-BSD FRML MDRD: 80 ML/MIN/1.73M2
GLUCOSE BLD-MCNC: 90 MG/DL (ref 70–125)
INTERPRETATION ECG - MUSE: NORMAL
P AXIS - MUSE: 62 DEGREES
POTASSIUM BLD-SCNC: 3.8 MMOL/L (ref 3.5–5)
PR INTERVAL - MUSE: 152 MS
PROT SERPL-MCNC: 7.3 G/DL (ref 6–8)
QRS DURATION - MUSE: 100 MS
QT - MUSE: 342 MS
QTC - MUSE: 432 MS
R AXIS - MUSE: 33 DEGREES
SODIUM SERPL-SCNC: 132 MMOL/L (ref 136–145)
SYSTOLIC BLOOD PRESSURE - MUSE: NORMAL MMHG
T AXIS - MUSE: 40 DEGREES
VENTRICULAR RATE- MUSE: 96 BPM

## 2022-02-24 PROCEDURE — 250N000013 HC RX MED GY IP 250 OP 250 PS 637: Performed by: INTERNAL MEDICINE

## 2022-02-24 PROCEDURE — 36415 COLL VENOUS BLD VENIPUNCTURE: CPT | Performed by: INTERNAL MEDICINE

## 2022-02-24 PROCEDURE — C9113 INJ PANTOPRAZOLE SODIUM, VIA: HCPCS | Performed by: INTERNAL MEDICINE

## 2022-02-24 PROCEDURE — 258N000003 HC RX IP 258 OP 636: Performed by: INTERNAL MEDICINE

## 2022-02-24 PROCEDURE — 82040 ASSAY OF SERUM ALBUMIN: CPT | Performed by: INTERNAL MEDICINE

## 2022-02-24 PROCEDURE — 120N000001 HC R&B MED SURG/OB

## 2022-02-24 PROCEDURE — 250N000011 HC RX IP 250 OP 636: Performed by: INTERNAL MEDICINE

## 2022-02-24 PROCEDURE — 76705 ECHO EXAM OF ABDOMEN: CPT

## 2022-02-24 PROCEDURE — 80053 COMPREHEN METABOLIC PANEL: CPT | Performed by: INTERNAL MEDICINE

## 2022-02-24 PROCEDURE — 99232 SBSQ HOSP IP/OBS MODERATE 35: CPT | Performed by: INTERNAL MEDICINE

## 2022-02-24 RX ORDER — DOCUSATE SODIUM 100 MG/1
100 CAPSULE, LIQUID FILLED ORAL 2 TIMES DAILY
Status: DISCONTINUED | OUTPATIENT
Start: 2022-02-25 | End: 2022-02-25 | Stop reason: HOSPADM

## 2022-02-24 RX ADMIN — GABAPENTIN 900 MG: 300 CAPSULE ORAL at 09:58

## 2022-02-24 RX ADMIN — GABAPENTIN 900 MG: 300 CAPSULE ORAL at 19:07

## 2022-02-24 RX ADMIN — GABAPENTIN 900 MG: 300 CAPSULE ORAL at 01:48

## 2022-02-24 RX ADMIN — PANTOPRAZOLE SODIUM 40 MG: 40 INJECTION, POWDER, FOR SOLUTION INTRAVENOUS at 07:43

## 2022-02-24 RX ADMIN — Medication 5 MG: at 21:14

## 2022-02-24 RX ADMIN — CLONIDINE HYDROCHLORIDE 0.1 MG: 0.1 TABLET ORAL at 01:48

## 2022-02-24 RX ADMIN — CLONIDINE HYDROCHLORIDE 0.1 MG: 0.1 TABLET ORAL at 09:58

## 2022-02-24 RX ADMIN — Medication 100 MG: at 09:58

## 2022-02-24 RX ADMIN — SODIUM CHLORIDE: 9 INJECTION, SOLUTION INTRAVENOUS at 07:47

## 2022-02-24 RX ADMIN — ACETAMINOPHEN 650 MG: 325 TABLET ORAL at 21:14

## 2022-02-24 RX ADMIN — FOLIC ACID 1 MG: 1 TABLET ORAL at 09:58

## 2022-02-24 RX ADMIN — CLONIDINE HYDROCHLORIDE 0.1 MG: 0.1 TABLET ORAL at 19:08

## 2022-02-24 RX ADMIN — Medication 1 TABLET: at 09:58

## 2022-02-24 RX ADMIN — ATORVASTATIN CALCIUM 40 MG: 40 TABLET, FILM COATED ORAL at 09:58

## 2022-02-24 ASSESSMENT — ACTIVITIES OF DAILY LIVING (ADL)
ADLS_ACUITY_SCORE: 4

## 2022-02-24 NOTE — PLAN OF CARE
Problem: Alcohol Withdrawal  Goal: Alcohol Withdrawal Symptom Control  Outcome: Ongoing, Progressing     Problem: Renal Function Impairment (Acute Kidney Injury/Impairment)  Goal: Effective Renal Function  Outcome: Ongoing, Progressing  Intervention: Monitor and Support Renal Function  Recent Flowsheet Documentation  Taken 2/24/2022 0337 by Yogesh Cunningham RN  Medication Review/Management: medications reviewed  Taken 2/23/2022 1984 by Yogesh Cunningham RN  Medication Review/Management: medications reviewed     Problem: Fluid and Electrolyte Imbalance (Acute Kidney Injury/Impairment)  Goal: Fluid and Electrolyte Balance  Outcome: Ongoing, Progressing    Patient reports very good sleep overnight. Denies pain and nausea. Scoring 4 on CIWA scale. Somewhat higher than normal activity and a mild, visible tremor noted with arms extended. IVF continuing per orders. Repeat labs this AM.    Yogesh Cunningham RN

## 2022-02-24 NOTE — PLAN OF CARE
Problem: Plan of Care - These are the overarching goals to be used throughout the patient stay.    Goal: Optimal Comfort and Wellbeing  Outcome: Ongoing, Progressing     Problem: Alcohol Withdrawal  Goal: Alcohol Withdrawal Symptom Control  Outcome: Ongoing, Progressing        Pt's CIWA scores were 5 and 3 this evening.  He reports mild anxiety, restlessness, and has mild tremor.  Denies having any nausea or headache.  He states that he is feeling much better now compared with how he felt prior to arrival to hospital.  Continues to report gastritis pain with swallowing but says this has also improved.

## 2022-02-24 NOTE — PROGRESS NOTES
[facility]    Medicine Progress Note - Hospitalist Service    Date of Admission:  2/23/2022    Assessment & Plan            41-year-old with untreated GERD who had alcohol and dietary indiscretion presented with nausea vomiting.  He was unable to take p.o. fluids or food.  Labs in the emergency room showed hyponatremia and MARY.  Patient is feeling better after IV fluids in the ER in the urgency room.      2/24 :       Patient has Ongoing alcohol abuse and last drink was on Tuesday  Scoring low on CIWA  Has some nausea   Patient requesting for ultrasound of liver/gall bladder and will order : moderate hepatic steatosis      Discharge in am if no alcohol w/d and tolerating diet        A/p :            Acute hyponatremia likely due to hypovolemia from nausea and vomiting  --Initially presented with sodium level of 121 in the urgency room  --Improved to 127 after IV fluids  --Continue normal saline at 75 cc an hour       MARY  --Nonoliguric  --Initial creatinine greater than 2 in the urgency room.  Repeat creatinine of 1.8  --Hold ACE inhibitor or other nephrotoxic substances  --Continue gentle IV hydration       Nausea and vomiting likely due to untreated GERD worsened with alcohol intake  --Order IV PPI  --Order gentle IV hydration and clear liquid diet.  Advance as tolerated       Alcohol use  -Blood alcohol level of 69  --Order CIWA protocol       Elevated LFT likely due to alcohol intake  --Anticipate trending down with IV fluids       Heartburn  --No evidence of ACS  --Anticipate improvement with PPI.  Discharged on PPI               Diet: Full Liquid Diet    DVT Prophylaxis: Pneumatic Compression Devices  Mancia Catheter: Not present  Central Lines: None  Cardiac Monitoring: None  Code Status: Full Code      Disposition Plan   Expected Discharge: 02/25/2022     Anticipated discharge location: home with family    Delays: tolerate oral diet, ultrasound RUQ       The patient's care was  "discussed with the Bedside Nurse and Patient.    Mika Fritz MD  Hospitalist Service  [facility]  Securely message with the [employer] Web Console (learn more here)  Text page via [employer] Paging/Directory         Clinically Significant Risk Factors Present on Admission         # Hyponatremia: Na = 132 mmol/L (Ref range: 136 - 145 mmol/L) on admission, will monitor as appropriate         # Overweight: Estimated body mass index is 25.77 kg/m  as calculated from the following:    Height as of this encounter: 1.88 m (6' 2\").    Weight as of this encounter: 91 kg (200 lb 11.2 oz).      ______________________________________________________________________      Data reviewed today: I reviewed all medications, new labs and imaging results over the last 24 hours. I personally reviewed no images or EKG's today.    Physical Exam   Vital Signs: Temp: 98  F (36.7  C) Temp src: Oral BP: 101/57 Pulse: 75   Resp: 18 SpO2: 98 % O2 Device: None (Room air)    Weight: 200 lbs 11.2 oz         GENERAL: The patient is not in any acute distressed. Awake and alert.  HEENT: Nonicteric sclerae, PERRLA, EOMI. Oropharynx clear. Moist mucous membranes. Conjunctivae appear well perfused.  HEART: Regular rate and rhythm without murmurs.  LUNGS: Clear to auscultation bilaterally. No wheezing or crackles.  ABDOMEN: Soft, positive bowel sounds, nontender.  SKIN: No rash, no excessive bruising, petechiae, or purpura.  EXTREMITIES : no rashes, no swelling in legs.  NEUROLOGIC: conscious and oriented, follows commands, no obvious focal deficits.  ROS: All other systems negative       Data   Recent Labs   Lab 02/24/22  0637 02/23/22  1328   WBC  --  6.5   HGB  --  14.3   MCV  --  91   PLT  --  246   * 127*   POTASSIUM 3.8 3.6   CHLORIDE 98 90*   CO2 23 20*   BUN 18 25*   CR 1.17 1.89*   ANIONGAP 11 17   NICKI 9.2 9.8   GLC 90 101   ALBUMIN 3.8 4.3   PROTTOTAL 7.3 8.3*   BILITOTAL 1.2* 0.9   ALKPHOS 61 73   * 193*   AST " 190* 140*   LIPASE  --  16

## 2022-02-24 NOTE — PLAN OF CARE
Problem: Plan of Care - These are the overarching goals to be used throughout the patient stay.    Goal: Optimal Comfort and Wellbeing  Outcome: Ongoing, Progressing   Goal Outcome Evaluation:  Pt denies pain.  Independent in room.  Currently NPO for ultrasound ordered for abdomen.  6:15 tabletime for tonight.    Problem: Plan of Care - These are the overarching goals to be used throughout the patient stay.    Goal: Absence of Hospital-Acquired Illness or Injury  Intervention: Identify and Manage Fall Risk  Recent Flowsheet Documentation  Taken 2/24/2022 0749 by Cyndee Zepeda, RN  Safety Promotion/Fall Prevention:   activity supervised   assistive device/personal items within reach  Independent in room.  Steady on feet.  Pt scoring a 2 and a 1 on CIWA protocol.  Anxious to go home to children and to be able to go back to work.  Intervention: Prevent Skin Injury  Recent Flowsheet Documentation  Taken 2/24/2022 0749 by Cyndee eZpeda, RN  Body Position: position changed independently  Intervention: Prevent and Manage VTE (Venous Thromboembolism) Risk  Recent Flowsheet Documentation  Taken 2/24/2022 0749 by Cyndee Zepeda, RN  Activity Management:   up ad hanna   activity adjusted per tolerance   activity encouraged   ambulated in room     Problem: Renal Function Impairment (Acute Kidney Injury/Impairment)  Goal: Effective Renal Function  Intervention: Monitor and Support Renal Function  Recent Flowsheet Documentation  Taken 2/24/2022 0749 by Cyndee Zepeda, RN  Medication Review/Management: medications reviewed  IV fluids are currently being given at 75/hr.

## 2022-02-25 VITALS
HEART RATE: 68 BPM | TEMPERATURE: 97.8 F | DIASTOLIC BLOOD PRESSURE: 72 MMHG | HEIGHT: 74 IN | SYSTOLIC BLOOD PRESSURE: 114 MMHG | BODY MASS INDEX: 25.76 KG/M2 | RESPIRATION RATE: 18 BRPM | WEIGHT: 200.7 LBS | OXYGEN SATURATION: 97 %

## 2022-02-25 PROCEDURE — 99239 HOSP IP/OBS DSCHRG MGMT >30: CPT | Performed by: INTERNAL MEDICINE

## 2022-02-25 PROCEDURE — 250N000013 HC RX MED GY IP 250 OP 250 PS 637: Performed by: FAMILY MEDICINE

## 2022-02-25 PROCEDURE — 250N000011 HC RX IP 250 OP 636: Performed by: INTERNAL MEDICINE

## 2022-02-25 PROCEDURE — C9113 INJ PANTOPRAZOLE SODIUM, VIA: HCPCS | Performed by: INTERNAL MEDICINE

## 2022-02-25 PROCEDURE — 250N000013 HC RX MED GY IP 250 OP 250 PS 637: Performed by: INTERNAL MEDICINE

## 2022-02-25 RX ORDER — OMEPRAZOLE 20 MG/1
20 TABLET, DELAYED RELEASE ORAL DAILY
Qty: 90 TABLET | Refills: 0 | Status: SHIPPED | OUTPATIENT
Start: 2022-02-25 | End: 2022-05-26

## 2022-02-25 RX ADMIN — PANTOPRAZOLE SODIUM 40 MG: 40 INJECTION, POWDER, FOR SOLUTION INTRAVENOUS at 08:55

## 2022-02-25 RX ADMIN — Medication 1 TABLET: at 08:54

## 2022-02-25 RX ADMIN — DOCUSATE SODIUM 100 MG: 100 CAPSULE, LIQUID FILLED ORAL at 08:54

## 2022-02-25 RX ADMIN — FOLIC ACID 1 MG: 1 TABLET ORAL at 08:54

## 2022-02-25 RX ADMIN — CLONIDINE HYDROCHLORIDE 0.1 MG: 0.1 TABLET ORAL at 09:01

## 2022-02-25 RX ADMIN — CLONIDINE HYDROCHLORIDE 0.1 MG: 0.1 TABLET ORAL at 02:43

## 2022-02-25 RX ADMIN — GABAPENTIN 900 MG: 300 CAPSULE ORAL at 09:00

## 2022-02-25 RX ADMIN — Medication 100 MG: at 08:53

## 2022-02-25 RX ADMIN — GABAPENTIN 900 MG: 300 CAPSULE ORAL at 02:43

## 2022-02-25 RX ADMIN — ATORVASTATIN CALCIUM 40 MG: 40 TABLET, FILM COATED ORAL at 08:54

## 2022-02-25 ASSESSMENT — ACTIVITIES OF DAILY LIVING (ADL)
ADLS_ACUITY_SCORE: 4

## 2022-02-25 NOTE — PLAN OF CARE
Problem: Alcohol Withdrawal  Goal: Alcohol Withdrawal Symptom Control  Outcome: Ongoing, Progressing    Problem: Oral Intake Inadequate (Acute Kidney Injury/Impairment)  Goal: Optimal Nutrition Intake  Outcome: Ongoing, Progressing     Problem: Fluid and Electrolyte Imbalance (Acute Kidney Injury/Impairment)  Goal: Fluid and Electrolyte Balance  Outcome: Ongoing, Progressing     Patient slept well overnight. Scoring 0 on CIWA scale. Oral intake good. Tolerating regular diet. Patient reports he feels significantly improved and is eager to discharge home today.    Yogesh Cunningham RN

## 2022-02-25 NOTE — PLAN OF CARE
Patient is tolerating regular diet. Pt. Denies nausea.    CIWA scores this shift=0.    Tylenol X 1 for headache with relief.    Pt. Would like to discharge tomorrow ASAP. Sticky note left for MD to let them know.

## 2022-02-25 NOTE — DISCHARGE SUMMARY
[facility]  Hospitalist Discharge Summary      Date of Admission:  2/23/2022  Date of Discharge:  2/25/2022  Discharging Provider: Luigi Gil MD  Discharge Service: Hospitalist Service    Discharge Diagnoses   Alcohol dependence  Nausea and vomiting, resolved   GERD  Hyponatremia, resolved   Hyperlipidemia  Hypertension    Follow-ups Needed After Discharge   Follow-up Appointments     Follow-up and recommended labs and tests       Follow up with primary care provider, Mame Dougherty, within 7 days for   hospital follow- up.  The following labs/tests are recommended: BMP in 2   to 3 days.             Unresulted Labs Ordered in the Past 30 Days of this Admission     No orders found from 1/24/2022 to 2/24/2022.      These results will be followed up by PCP    Discharge Disposition   Discharged to home  Condition at discharge: Stable    Hospital Course     41-year-old with past medical history of hypertension, hyperlipidemia, GERD, alcohol dependence who presented with nausea vomiting.  He was unable to take p.o. fluids or food.  Labs in the emergency room showed hyponatremia and MARY.  Please refer to H&P for details.    Acute hyponatremia   - likely due to hypovolemia from nausea and vomiting  - Initially presented with sodium level of 121 in the urgency room  - Improved with IV fluid support  - Sodium is up to 132 before discharge.  - Continue to monitor sodium level as outpatient    Nausea and vomiting  - Secondary to alcohol dependence  - Resolved   - Patient tolerated regular diet before discharge    Alcohol dependence  - Patient presented with alcohol level of 69  - Started on CIWA protocol  - No sign or symptom of alcohol withdrawal  - Social service consult for chemical dependency, patient declined    GERD  - Patient presented with heartburn  - Probably secondary to alcohol abuse  - Started on a PPI    Hypertension  - Hold home medication on admission because of MARY and  hyponatremia  - Restart lisinopril/HCTZ on discharge  - Continue to monitor blood pressure as outpatient    Hyperlipidemia  - Resume statin as LFTs are improving    Consultations This Hospital Stay   SOCIAL WORK IP CONSULT    Code Status   Full Code    Time Spent on this Encounter   ILuigi MD, personally saw the patient today and spent greater than 30 minutes discharging this patient.       Luigi Gil MD  [address] [address]  Phone: [phone]  Fax: [phone]  ______________________________________________________________________    Physical Exam   Vital Signs: Temp: 97.8  F (36.6  C) Temp src: Oral BP: 114/72 Pulse: 68   Resp: 18 SpO2: 97 % O2 Device: None (Room air)    Weight: 200 lbs 11.2 oz  General Appearance: Sleeping comfortably in bed in no acute distress  Respiratory: Clear to auscultation bilaterally  Cardiovascular: Normal heart sounds, no murmur.  GI: Soft, normal bowel sounds.  No tenderness.  Skin: Dry warm, no rash.  Other: Alert, awake, oriented x3.  No focal deficit.       Primary Care Physician   Mame Dougherty    Discharge Orders      Reason for your hospital stay    Alcohol dependence, hyponatremia     Follow-up and recommended labs and tests     Follow up with primary care provider, Mame Dougherty, within 7 days for hospital follow- up.  The following labs/tests are recommended: BMP in 2 to 3 days.     Activity    Your activity upon discharge: activity as tolerated     Diet    Follow this diet upon discharge: Orders Placed This Encounter      Regular Diet Adult       Significant Results and Procedures   Most Recent 3 CBC's:Recent Labs   Lab Test 02/23/22  1328   WBC 6.5   HGB 14.3   MCV 91        Most Recent 3 BMP's:Recent Labs   Lab Test 02/24/22  0637 02/23/22  1328 10/05/21  1608   * 127* 133*   POTASSIUM 3.8 3.6 4.4   CHLORIDE 98 90* 93*   CO2 23 20* 25   BUN 18 25* 9   CR 1.17 1.89* 0.91    ANIONGAP 11 17 15   NICKI 9.2 9.8 10.4   GLC 90 101 160*   ,   Results for orders placed or performed during the hospital encounter of 02/23/22   XR Chest 2 Views    Narrative    EXAM: XR CHEST 2 VW  LOCATION: [facility]  DATE/TIME: 2/23/2022 3:25 PM    INDICATION: chest pain  COMPARISON: 08/23/2004      Impression    IMPRESSION: Cervical fusion hardware. Heart size appears normal. Lungs appear clear.   US Abdomen Limited    Narrative    EXAM: US ABDOMEN LIMITED  LOCATION: [facility]  DATE/TIME: 2/24/2022 6:05 PM    INDICATION: Abnormal LFT's.  COMPARISON: None.  TECHNIQUE: Limited abdominal ultrasound.    FINDINGS:    GALLBLADDER: No gallstones, wall thickening, or pericholecystic fluid. Negative sonographic Payne's sign.    BILE DUCTS: No biliary dilatation. The common duct measures 2 mm.    LIVER: Diffusely echogenic parenchyma with smooth contour. No focal mass.    RIGHT KIDNEY: No hydronephrosis.    PANCREAS: The visualized portions are normal.    No ascites.      Impression    IMPRESSION:  1. Moderate hepatic steatosis.  2. Remaining exam unremarkable.             Discharge Medications   Current Discharge Medication List      START taking these medications    Details   omeprazole (PRILOSEC OTC) 20 MG EC tablet Take 1 tablet (20 mg) by mouth daily  Qty: 90 tablet, Refills: 0    Associated Diagnoses: Gastroesophageal reflux disease without esophagitis         CONTINUE these medications which have NOT CHANGED    Details   atorvastatin (LIPITOR) 40 MG tablet Take 40 mg by mouth daily       lisinopril-hydrochlorothiazide (PRINZIDE,ZESTORETIC) 10-12.5 mg per tablet [LISINOPRIL-HYDROCHLOROTHIAZIDE (PRINZIDE,ZESTORETIC) 10-12.5 MG PER TABLET] Take 1 tablet by mouth daily.      ondansetron (ZOFRAN) 4 MG tablet Take 4 mg by mouth every 6 hours as needed for nausea           Allergies   No Known Allergies

## 2022-02-26 DIAGNOSIS — Z71.89 OTHER SPECIFIED COUNSELING: ICD-10-CM

## 2022-02-28 ENCOUNTER — PATIENT OUTREACH (OUTPATIENT)
Dept: CARE COORDINATION | Facility: CLINIC | Age: 42
End: 2022-02-28
Payer: COMMERCIAL

## 2022-02-28 NOTE — PROGRESS NOTES
Clinic Care Coordination Contact  Care Team Conversations    Patient identified for care management outreach, however patient is not on a value based contract so cannot complete outreach. Will escalate to clinic staff if specific needs or resources are indicated.    GINA Hines  Social Work Care Coordinator - [facility]  Care Coordination  [email or web address]  Cell Phone: [phone]  Gender pronouns: she/her  Employed by [employer]

## 2023-03-24 ENCOUNTER — HOSPITAL ENCOUNTER (EMERGENCY)
Facility: HOSPITAL | Age: 43
Discharge: HOME | End: 2023-03-24
Attending: EMERGENCY MEDICINE | Admitting: EMERGENCY MEDICINE
Payer: COMMERCIAL

## 2023-03-24 VITALS
WEIGHT: 200 LBS | HEART RATE: 99 BPM | BODY MASS INDEX: 27.09 KG/M2 | SYSTOLIC BLOOD PRESSURE: 161 MMHG | RESPIRATION RATE: 20 BRPM | HEIGHT: 72 IN | OXYGEN SATURATION: 96 % | DIASTOLIC BLOOD PRESSURE: 107 MMHG | TEMPERATURE: 98 F

## 2023-03-24 DIAGNOSIS — F41.9 ANXIETY: ICD-10-CM

## 2023-03-24 DIAGNOSIS — F41.0 PANIC ATTACK: ICD-10-CM

## 2023-03-24 LAB
ALBUMIN SERPL BCG-MCNC: 4.7 G/DL (ref 3.5–5.2)
ALP SERPL-CCNC: 111 U/L (ref 40–129)
ALT SERPL W P-5'-P-CCNC: 192 U/L (ref 10–50)
ANION GAP SERPL CALCULATED.3IONS-SCNC: 13 MMOL/L (ref 7–15)
AST SERPL W P-5'-P-CCNC: 271 U/L (ref 10–50)
BILIRUB DIRECT SERPL-MCNC: <0.2 MG/DL (ref 0–0.3)
BILIRUB SERPL-MCNC: 0.5 MG/DL
BUN SERPL-MCNC: 5.3 MG/DL (ref 6–20)
CALCIUM SERPL-MCNC: 9.6 MG/DL (ref 8.6–10)
CHLORIDE SERPL-SCNC: 102 MMOL/L (ref 98–107)
CREAT SERPL-MCNC: 0.66 MG/DL (ref 0.67–1.17)
DEPRECATED HCO3 PLAS-SCNC: 26 MMOL/L (ref 22–29)
ERYTHROCYTE [DISTWIDTH] IN BLOOD BY AUTOMATED COUNT: 12.3 % (ref 10–15)
ETHANOL SERPL-MCNC: 0.2 G/DL
GFR SERPL CREATININE-BSD FRML MDRD: >90 ML/MIN/1.73M2
GLUCOSE SERPL-MCNC: 109 MG/DL (ref 70–99)
HCT VFR BLD AUTO: 48.2 % (ref 40–53)
HGB BLD-MCNC: 16.4 G/DL (ref 13.3–17.7)
HOLD SPECIMEN: NORMAL
MAGNESIUM SERPL-MCNC: 1.9 MG/DL (ref 1.7–2.3)
MCH RBC QN AUTO: 31.7 PG (ref 26.5–33)
MCHC RBC AUTO-ENTMCNC: 34 G/DL (ref 31.5–36.5)
MCV RBC AUTO: 93 FL (ref 78–100)
PLATELET # BLD AUTO: 211 10E3/UL (ref 150–450)
POTASSIUM SERPL-SCNC: 3.9 MMOL/L (ref 3.4–5.3)
PROT SERPL-MCNC: 8.4 G/DL (ref 6.4–8.3)
RBC # BLD AUTO: 5.18 10E6/UL (ref 4.4–5.9)
SODIUM SERPL-SCNC: 141 MMOL/L (ref 136–145)
TSH SERPL DL<=0.005 MIU/L-ACNC: 1.17 UIU/ML (ref 0.3–4.2)
WBC # BLD AUTO: 3.5 10E3/UL (ref 4–11)

## 2023-03-24 PROCEDURE — 96374 THER/PROPH/DIAG INJ IV PUSH: CPT

## 2023-03-24 PROCEDURE — 250N000011 HC RX IP 250 OP 636

## 2023-03-24 PROCEDURE — 84443 ASSAY THYROID STIM HORMONE: CPT

## 2023-03-24 PROCEDURE — 90791 PSYCH DIAGNOSTIC EVALUATION: CPT

## 2023-03-24 PROCEDURE — 82077 ASSAY SPEC XCP UR&BREATH IA: CPT

## 2023-03-24 PROCEDURE — 82248 BILIRUBIN DIRECT: CPT | Performed by: EMERGENCY MEDICINE

## 2023-03-24 PROCEDURE — 82310 ASSAY OF CALCIUM: CPT

## 2023-03-24 PROCEDURE — 250N000013 HC RX MED GY IP 250 OP 250 PS 637: Performed by: EMERGENCY MEDICINE

## 2023-03-24 PROCEDURE — 99285 EMERGENCY DEPT VISIT HI MDM: CPT | Mod: 25

## 2023-03-24 PROCEDURE — 83735 ASSAY OF MAGNESIUM: CPT

## 2023-03-24 PROCEDURE — 93005 ELECTROCARDIOGRAM TRACING: CPT

## 2023-03-24 PROCEDURE — 36415 COLL VENOUS BLD VENIPUNCTURE: CPT

## 2023-03-24 PROCEDURE — 85014 HEMATOCRIT: CPT

## 2023-03-24 RX ORDER — LORAZEPAM 1 MG/1
1 TABLET ORAL EVERY 6 HOURS PRN
Qty: 15 TABLET | Refills: 0 | Status: SHIPPED | OUTPATIENT
Start: 2023-03-24 | End: 2023-06-02

## 2023-03-24 RX ORDER — LISINOPRIL/HYDROCHLOROTHIAZIDE 10-12.5 MG
1 TABLET ORAL DAILY
Qty: 20 TABLET | Refills: 0 | Status: SHIPPED | OUTPATIENT
Start: 2023-03-24 | End: 2023-06-02

## 2023-03-24 RX ORDER — CLONIDINE HYDROCHLORIDE 0.1 MG/1
0.1 TABLET ORAL ONCE
Status: COMPLETED | OUTPATIENT
Start: 2023-03-24 | End: 2023-03-24

## 2023-03-24 RX ORDER — LORAZEPAM 2 MG/ML
1 INJECTION INTRAMUSCULAR ONCE
Status: COMPLETED | OUTPATIENT
Start: 2023-03-24 | End: 2023-03-24

## 2023-03-24 RX ADMIN — LORAZEPAM 1 MG: 2 INJECTION INTRAMUSCULAR; INTRAVENOUS at 11:27

## 2023-03-24 RX ADMIN — CLONIDINE HYDROCHLORIDE 0.1 MG: 0.1 TABLET ORAL at 12:21

## 2023-03-24 ASSESSMENT — ENCOUNTER SYMPTOMS
AGITATION: 1
VOMITING: 0
SHORTNESS OF BREATH: 0
NAUSEA: 0
SLEEP DISTURBANCE: 1
APPETITE CHANGE: 1
NERVOUS/ANXIOUS: 1

## 2023-03-24 ASSESSMENT — ACTIVITIES OF DAILY LIVING (ADL)
ADLS_ACUITY_SCORE: 33
ADLS_ACUITY_SCORE: 35

## 2023-03-24 ASSESSMENT — COLUMBIA-SUICIDE SEVERITY RATING SCALE - C-SSRS
2. HAVE YOU ACTUALLY HAD ANY THOUGHTS OF KILLING YOURSELF?: NO
6. HAVE YOU EVER DONE ANYTHING, STARTED TO DO ANYTHING, OR PREPARED TO DO ANYTHING TO END YOUR LIFE?: NO
TOTAL  NUMBER OF ABORTED OR SELF INTERRUPTED ATTEMPTS LIFETIME: NO
ATTEMPT LIFETIME: NO
TOTAL  NUMBER OF INTERRUPTED ATTEMPTS LIFETIME: NO
1. HAVE YOU WISHED YOU WERE DEAD OR WISHED YOU COULD GO TO SLEEP AND NOT WAKE UP?: NO

## 2023-03-24 NOTE — CONSULTS
Diagnostic Evaluation Consultation  Crisis Assessment    Patient was assessed: Iva  Patient location: [facility] ED  Was a release of information signed: No. Reason: patient does not currently have outpatient providers      Referral Data and Chief Complaint  Chandler Branch is a 42 year old, who uses he/him pronouns, and presents to the ED alone. Patient is referred to the ED by self. Patient is presenting to the ED for the following concerns: anxiety and panic attack.      Informed Consent and Assessment Methods     Patient is his own guardian. Writer met with patient and explained the crisis assessment process, including applicable information disclosures and limits to confidentiality, assessed understanding of the process, and obtained consent to proceed with the assessment. Patient was observed to be able to participate in the assessment as evidenced by being cooperative and able to respond to questions. Assessment methods included conducting a formal interview with patient, review of medical records, collaboration with medical staff, and obtaining relevant collateral information from family and community providers when available..     Over the course of this crisis assessment provided reassurance, offered validation, engaged patient in problem solving and disposition planning and worked with patient on safety and aftercare planning. Patient's response to interventions was being cooperative and able to respond to questions.      Summary of Patient Situation    The patient was seen today at [facility] ED, by the [facility] (DEC), through virtual crisis assessment. The patient is alert, oriented, calm, and cooperative. Thought processes are organized. Speech coherent. Affect and eye contact are appropriate. Patient denies suicidal ideation, plans or intent. He reports no symptoms of dheeraj and no symptoms of psychosis. Denies homicidal ideation. He denies having access to firearms. Denies use of illicit  "drugs. He reports that he consumed an excessive amount of alcohol last night. He says that last night he drank 4 vodka drinks with one of his neighbors. ETOH level at 11:34am is 0.20. Patient did not seem intoxicated during the interview which leads DEC to believe the patient has a high tolerance for alcohol.    Patient arrived to the hospital on his own and is reporting recent increased anxiety and panic attacks. The patient is in the  and works at the [employer]. He says he was in the [employer] for many years but now serves in the [employer]. He reports he has had increased stress in his life with work, finances, being deported soon, his children and his ex-wife who has not been paying child support. Does not feel he is coping well at home, is not sleeping well, has lost \"20 pounds in the past month\" and has started drinking more frequently. He states that he wakes up at 3am everyday and has trouble going back to sleep.    Brief Psychosocial History    Patient has been in the  for 25 years and currently works for the [employer]. He is a single parents of three children ages of 14, 11, and 12. His father is watching his children while he drove himself to the hospital. Patient reports he has been  for 15 years and has \"baby momma drama\". He says that she has not been paying child support lately which has caused a heavy burden on him. He states that outside of work he stays busy taking his children to sports or [employer].    Significant Clinical History    Patient reports to have a longstanding history for anxiety, recent panic attacks, and previous history for alcohol abuse. He endorses recently feeling overwhelmed and having depressive symptoms. He reports there has been some previous exposure to traumatic events but he has never been diagnosed with PTSD. He denies previous psychiatric hospitalizations, civil commitments, or residential treatments. He denies previous SIB and denies suicide attempts. He has seen " counselors or therapists in the past and recently started seeing a therapist through the [employer]. He is not prescribed medications for mental health. He states that he has limited time in the day to engage in self care activities.      Collateral Information    DEC  attempted to make contact with patient's father, Andrés Branch, and left a phone message for him to return the call. Andrés can be reached at [phone]. Andrés never returned the phone call to [facility].      Risk Assessment    Rich Suicide Severity Rating Scale Full Clinical Version:  3/24/23  Suicidal Ideation  1. Wish to be Dead (Lifetime): No  2. Non-Specific Active Suicidal Thoughts (Lifetime): No     Suicidal Behavior  Actual Attempt (Lifetime): No  Has subject engaged in non-suicidal self-injurious behavior? (Lifetime): No  Interrupted Attempts (Lifetime): No  Aborted or Self-Interrupted Attempt (Lifetime): No  Preparatory Acts or Behavior (Lifetime): No  C-SSRS Risk (Lifetime/Recent)  Calculated C-SSRS Risk Score (Lifetime/Recent): No Risk Indicated    Rich Suicide Severity Rating Scale Since Last Contact:  3/24/23    Validity of evaluation is not impacted by presenting factors during interview.   Comments regarding subjective versus objective responses to Rich tool: Patient is alert, oriented, calm, and cooperate. He reports no history for suicidal ideation or attempts. He seems honest and forthcoming.  Environmental or Psychosocial Events: challenging interpersonal relationships, excessive debt, poor finances, other life stressors, ongoing abuse of substances and other: job stress  Chronic Risk Factors: chronic and ongoing sleep difficulties and other:  , trauma exposure, excessive use of alcohol   Warning Signs: increasing substance use or abuse and anxiety, agitation, unable to sleep, sleeping all the time  Protective Factors: strong bond to family unit, community support, or employment, responsibilities and duties to  others, including pets and children, lives in a responsibly safe and stable environment, help seeking and cultural, spiritual , or Presybeterian beliefs associated with meaning and value in life  Interpretation of Risk Scoring, Risk Mitigation Interventions and Safety Plan:  Patient denies having suicide thoughts and indicates he can follow up with safety planning.     Does the patient have thoughts of harming others? No     Is the patient engaging in sexually inappropriate behavior?  no        Current Substance Abuse     Is there recent substance abuse?  Patient reports to consume about 1 drink per day, usually after the children have gone to sleep.      Was a urine drug screen or blood alcohol level obtained: Yes ETOH 0.20 at 11:23am       Mental Status Exam     Affect: Appropriate   Appearance: Appropriate    Attention Span/Concentration: Attentive  Eye Contact: Engaged   Fund of Knowledge: Appropriate    Language /Speech Content: Fluent   Language /Speech Volume: Normal    Language /Speech Rate/Productions: Normal    Recent Memory: Intact   Remote Memory: Intact   Mood: Anxious    Orientation to Person: Yes    Orientation to Place: Yes   Orientation to Time of Day: Yes    Orientation to Date: Yes    Situation (Do they understand why they are here?): Yes    Psychomotor Behavior: Normal    Thought Content: Clear   Thought Form: Goal Directed      History of commitment: No       Medication    Psychotropic medications:  no   Compliant: NA  Medication changes made in the last two weeks:  NA     Current Care Team    Primary Care Provider:  Mame Dougherty MD at [place]  Psychiatrist:  no  Therapist: no  :  no     CTSS or ARMHS:  no  ACT Team:  no  Other:  No       Diagnosis      F41.1 Generalized anxiety disorder, by history    F10.229 Alcohol intoxication, with moderate or severe use disorder, R/O      Clinical Summary and Substantiation of Recommendations      Patient is recommended for outpatient  individual therapy services of chemical health assessment, individual therapy, and medication evaluation. Patient reports to having multiple psychosocial stressors related to job and finances. He endorses significant level of anxiety, panic attack, and recent excessive use of alcohol. He does have previous history for alcohol abuse. He denies suicidal ideation, plans, or intent. Patient is not an acute risk for imminent danger to self or others. Denies homicidal ideation and thought processes are organized. He is receptive to individual therapy and follow up with safety planning. Demonstrates adequate level of protective factors and future oriented thinking.      Disposition    Recommended disposition: Individual Therapy, Medication Management and Rule 25/EMILY Assessment       Reviewed case and recommendations with attending provider. Attending Name:  Adilson Andrade MD       Attending concurs with disposition: Yes       Patient concurs with disposition: No: he is not receptive to being scheduled for appointments. He does have a therapist he recently started seeing and has an upcoming appointment.        Guardian concurs with disposition: NA        Final disposition: Individual therapy .       Outpatient Details (if applicable):   Aftercare plan and appointments placed in the AVS and provided to patient: Yes. Given to patient by ED staff and nursing    Was lethal means counseling provided as a part of aftercare planning? No;       Assessment Details    Patient interview started at:  12:30pm and completed at: 1:30pm.     Total duration spent on the patient case in minutes: 1.0 hrs      CPT code(s) utilized: 74820 - Psychotherapy for Crisis - 60 (30-74*) min       Scott Arreola, [facility], MSW, Psychotherapist  DEC - Triage & Transition Services  Callback:  [phone]        Aftercare Plan        Recommendations:   [facility] ([facility]) recommends patient follow up with chemical health assessment,  individual therapy, and medication evaluation/management through psychiatric provider. Coordinators from [facility] will be calling you in the next 24-48 hours to ensure that you have the resources you need. Patient can also contact [facility] coordinators directly at [phone].        Scheduled Appointments:  Patient was not agreeable to being scheduled for services at this time. Please call [facility] if you change your mind.      AA Resources:  [place] and [facility]  [phone] / aaFerric [employer].org     [place] [facility]  [phone]     [place] [facility]  [phone] / CYP [employer].org     Alcoholics Anonymous  Phone: 1-800-ALCOHOL  Website: HTTP://WWW.AA.ORG/  AA [place] (711-824-4988 or http://[employer].org)  AA [place] (007-130-3216 or www.CYP [employer].Haivision)     Narcotics Anonymous  Phone: 701.678.9967  Website: www.[employer]uerl[employer].VideoStep.            If I am feeling unsafe or I am in a crisis, I will:          Contact my established care providers     Call the National Suicide Prevention Lifeline: 833.352.1367     Go to the nearest emergency room     Call 131     Crisis Text Line, 080391        Warning signs that I or other people might notice when a crisis is developing for me:   Persistent worsening of anxiety or depression, intrusive thoughts for self harm or suicide, continued excessive use of alcohol, feeling like there's no way out, Rage or anger, withdrawing from family/friends, dramatic mood swings     Things I am able to do on my own to cope or help me feel better:  Engage in frequent self care activities, take sick time off from work to take care of physical and mental health      Things that I am able to do with others to cope or help me better:  Spending quality time with friends or family. Going for dinner, playing cards or board games, coffee shops, biking.     Spending quality time with loved ones, Staying hydrated, Exercise, Eating balanced meals, Going for a walk  every day, Take care of daily responsibilities/needs, Focus on positive self-talk vs negative self-talk     Things I can use or do for distraction:  Exercise, Listen to music, Go for a run, Deep breathing, Meditations, Journal, Self check-in, Ask for help     Changes I can make to support my mental health and wellness:   -I will commit to 30 minutes of self care daily - this can be as simple as taking a shower, going for a walk, cooking a meal, read, writing, etc  - Attend AA meetings, continue [facility]     People in my life that I can ask for help:  Family, Friends, Providers, Crisis Response Team     [facility] has a mental health crisis team you can call 24/7: [facility] Mobile Crisis  [phone] (adults)  [phone] (children)        Additional Information  Today you were seen by a licensed mental health professional through Triage and Transition services, [facility] ([facility])  for a crisis assessment in the Emergency Department at [facility].  It is recommended that you follow up with your established providers (psychiatrist, mental health therapist, and/or primary care doctor - as relevant) as soon as possible.      Coordinators from [facility] will be calling you in the next 24-48 hours to ensure that you have the resources you need.  You can also contact [facility] coordinators directly at [phone]. You may have been scheduled for or offered an appointment with a mental health provider. [facility] maintains an extensive network of licensed behavioral health providers to connect patients with the services they need.  We do not charge providers a fee to participate in our referral network.  We match patients with providers based on a patient's specific needs, insurance coverage, and location.  Our first effort will be to refer you to a provider within your care system, and will utilize providers outside your care system as needed.           Substance Use Disorder  Direct Access Resources  It is recommended that you abstain from all mood altering chemicals. Please contact the sober support hotline (357-166-2500) as needed; phones are answered 24 hours a day, 7 days a week.     To access substance use treatment you must have a comprehensive assessment completed to begin any treatment program.     Community EMILY Evaluations: Clients may call their county for a full list of providers - Availability and services listed belo are subject to change, please call the provider to confirm  [facility]  [phone]  [facility] [place], [place], [address]  *Please call the above number to schedule a comprehensive assessment for determination of level of care needs. In person and virtual appointments available Mon-Fri.     [facility], [address] [place] [address], First Floor, [address], [place] [address] (next to the outpatient lab)    Phone: [phone]   Provides bridging services to people with Opiate Use Disorders (OUD) seeking care. This is a front door to Medication Assisted Treatments (MAT), ages 16+  Walk In hours: Monday-Friday 9:00am-3:00pm     [place]  [phone] (phone consultation available )  Locations in: [place], [facility], and [place], [place]  [facility] [facility] IOP [phone] or visit [facility] Fernanda[employer] [address] travel.CaratLane/GONSALO   Also offers LGBTQ programming     [address], [address]     [facility]  For more information to to make a referral call:  [phone]  Walk-in on   9-11 a.m.     [facility]  [phone]  Locations: [facility], [facility], and [place]  *Walk in assessments availble M-F starting at 8 am -virtual only     [facility]  Virtual + Locations: [place], [place], [place], [place], [facility], [facility], [place],  [place]   [phone]  *available by appointment only     Domingo Kim  [phone]  Same day substance use disorder assessments are available Monday - Friday, via walk-in or by appointment at the [place] location.  [facility], [address], [place] [address]      [facility] - adolescent and adult SUDs services  [phone]     Offer services Monday through Friday, as well as evening hours Monday through Thursday. Normally, a first appointment will be scheduled within one week  [email or web address]-treatment  Locations all over [place]     If you are intoxicated, you may be required to detox at a detox facility before starting treatment. The following are detox facilities that you can self present to. All detox facilities are able to help you complete an assessment prior to discharge if you choose:     [facility]: Arrive at a [facility] for immediate medical evaluation     [facility]: [address] [place], [place], [address].         [phone]     [facility]: [address] [place], [place], [address]  [phone]      [facility] ([facility]): [address] [place], [place], [address] [phone]      [facility]: [address] [place], [place], [address], [phone]        Free Resources:  [place] [facility] ([facility])  [facility] connects people seeking recovery to resources that help foster and sustain long-term recovery. Whether you are seeking resources for treatment, transportation, housing, job training, education, health care or other pathways to recovery, [facility] is a great place to start.  Phone: 405.642.7831. www.[place]"[employer]".org (Great listing of all types of recovery and non-recovery related resources)     Alcoholics Anonymous  Phone: 4-800-ALCOHOL  Website: HTTP://WWW.AA.ORG/  AA [place] (162-369-9412 or http://aaminneapolFutura Medical.org)  AA [place] (265-114-4428 or  www.aastpaul.org)     Narcotics Anonymous  Phone: 454.883.8985  Website: www.GT Solar.[employer]N Logistics.     [employer] [address], [place], [place],  Phone: [phone]     Drop-in Hours: Monday-Friday 9-11:30 am. By appointment at other times.  Provides: [facility] is a drop-in center on the east side [facility] that provides a safe space for individuals who are homeless and have a history of chemical use. Sobriety is not a requirement but drugs and alcohol are not allowed on the property.  Services: Non-clients can access drop-in services such as Recovery and Harm Reduction Groups, referrals to case management, community activities, shower facilities, and a pool table. Individuals who are homeless and have chemical health needs may be eligible for enrollment into [facility]'s case management program. Clients and  work together to access benefits, treatment, health care, shelter, and external housing resources.

## 2023-03-24 NOTE — DISCHARGE INSTRUCTIONS
Please bring this paperwork with you to your follow-up appointment.    You were seen in the urgent care/emergency department for anxiety and panic attacks and increased stress.     Blood alcohol level was very high today, the rest of your tests looked fairly normal today.  You were seen by social work during today's visit who provided information below for resources going forward.  Please establish care with a psychiatrist for ongoing management of your anxiety and stress.    For your symptoms:  Ativan 1 mg every 6 hours as needed for acute anxiety and panic    Tylenol/ibuprofen as needed  You may take up to 650 mg of Tylenol (acetaminophen) up to 4 times daily and up to 600 mg of ibuprofen up to 4 times daily as needed for fever, pain.  Please do not take more than the daily maximum recommended dose (tylenol = 4 grams, ibuprofen = 2.4 grams) as it can cause harm to your liver, kidneys, stomach.  It is best to take ibuprofen with food. Please read labels of any over-the-counter medicine you may be taking as it may contain Tylenol (acetaminophen) or Advil (ibuprofen).     Please continue taking your blood pressure medication as previously prescribed and follow-up with your primary care provider for ongoing management of this.  These follow-up with your PCP in 3 days for ER follow-up.    Return to the emergency department if you develop worsening anxiety, panic symptoms, chest pain, shortness of breath, or any other new worsening or concerning symptoms. We'd be happy to see you again.    Thank you for allowing us to be part of your care today.    Take care!  -Little Rudolph PA-C   Aftercare Plan      Recommendations:   [facility] ([facility]) recommends patient follow up with chemical health assessment, individual therapy, and medication evaluation/management through psychiatric provider. Coordinators from P will be calling you in the next 24-48 hours to ensure that you have the resources you need.  Patient can also contact [facility] coordinators directly at [phone].      Scheduled Appointments:  Patient was not agreeable to being scheduled for services at this time. Please call [facility] if you change your mind.     AA Resources:  [facility] Inter-group [facility]  [phone] / [email or web address].org    [place] [facility]  [phone]    [facility]  [phone] / [email or web address].org    Alcoholics Anonymous  Phone: 0-523-ALCOHOL  Website: HTTP://WWW.AA.ORG/  AA [place] (572-808-7246 or http://[employer].org)  AA [place] (574-226-9610 or www.[employer])     Narcotics Anonymous  Phone: 183.310.3201  Website: www.[employer].         If I am feeling unsafe or I am in a crisis, I will:       Contact my established care providers   Call the National Suicide Prevention Lifeline: 332.580.8204   Go to the nearest emergency room   Call 074   Crisis Text Line, 179519      Warning signs that I or other people might notice when a crisis is developing for me:   Persistent worsening of anxiety or depression, intrusive thoughts for self harm or suicide, continued excessive use of alcohol, feeling like there's no way out, Rage or anger, withdrawing from family/friends, dramatic mood swings     Things I am able to do on my own to cope or help me feel better:  Engage in frequent self care activities, take sick time off from work to take care of physical and mental health      Things that I am able to do with others to cope or help me better:  Spending quality time with friends or family. Going for dinner, playing cards or board games, coffee shops, biking.    Spending quality time with loved ones, Staying hydrated, Exercise, Eating balanced meals, Going for a walk every day, Take care of daily responsibilities/needs, Focus on positive self-talk vs negative self-talk     Things I can use or do for distraction:  Exercise, Listen to music, Go for a run, Deep breathing, Meditations,  Journal, Self check-in, Ask for help     Changes I can make to support my mental health and wellness:   -I will commit to 30 minutes of self care daily - this can be as simple as taking a shower, going for a walk, cooking a meal, read, writing, etc  - Attend AA meetings, [facility]    People in my life that I can ask for help:  Family, Friends, Providers, Crisis Response Team    Your [facility] has a mental health crisis team you can call 24/7: [facility] Mobile Crisis  [phone] (adults)  [phone] (children)      Additional Information  Today you were seen by a licensed mental health professional through Triage and Transition services, [facility] ([facility])  for a crisis assessment in the Emergency Department at [facility].  It is recommended that you follow up with your established providers (psychiatrist, mental health therapist, and/or primary care doctor - as relevant) as soon as possible.     Coordinators from [facility] will be calling you in the next 24-48 hours to ensure that you have the resources you need.  You can also contact [facility] coordinators directly at [phone]. You may have been scheduled for or offered an appointment with a mental health provider. [facility] maintains an extensive network of licensed behavioral health providers to connect patients with the services they need.  We do not charge providers a fee to participate in our referral network.  We match patients with providers based on a patient's specific needs, insurance coverage, and location.  Our first effort will be to refer you to a provider within your care system, and will utilize providers outside your care system as needed.         Substance Use Disorder Direct Access Resources  It is recommended that you abstain from all mood altering chemicals. Please contact the sober support hotline (941-936-0911) as needed; phones are answered 24 hours a day, 7 days a week.    To access  substance use treatment you must have a comprehensive assessment completed to begin any treatment program.    Community EMILY Evaluations: Clients may call their county for a full list of providers - Availability and services listed belo are subject to change, please call the provider to confirm  [facility]  [phone] [address], [place], [address]  *Please call the above number to schedule a comprehensive assessment for determination of level of care needs. In person and virtual appointments available Mon-Fri.    [facility], [address], [address] Floor, [address], [place] [address] (next to the outpatient lab)    Phone: [phone]   Provides bridging services to people with Opiate Use Disorders (OUD) seeking care. This is a front door to Medication Assisted Treatments (MAT), ages 16+  Walk In hours: Monday-Friday 9:00am-3:00pm    [facility]  [phone] (phone consultation available )  Locations in: [facility] [place], and [place], [place]  [place] virtual IOP [phone] or visit [employer]   Also offers LGBTQ programming    [facility]  [address] [place], [place], [address]    [facility]  For more information to to make a referral call:  [phone]  Walk-in on   9-11 a.m.    [facility]  [phone]  Locations: [facility], [facility], and [place]  *Walk in assessments availble M-F starting at 8 am -virtual only    [facility]  Virtual + Locations: [place], [place], [place], [place], [facility], [place], [place], [place]   [phone]  *available by appointment only    [facility]  [phone]  Same day substance use disorder assessments are available Monday - Friday, via walk-in or by appointment at the [place] location.  [address] [place] [address], [address], [place] [address]     [facility] - adolescent and adult SUDs services  [phone]    Offer services Monday through Friday, as well as evening hours Monday through Thursday. Normally, a first appointment will be scheduled within one week  [email or web address]  Locations all over [place]    If you are intoxicated, you may be required to detox at a detox facility before starting treatment. The following are detox facilities that you can self present to. All detox facilities are able to help you complete an assessment prior to discharge if you choose:    [facility]: Arrive at a [facility] for immediate medical evaluation    [facility]: [address] [place], [place], [address].         [phone]    [facility]: [address] [place], [place], [address]  [phone]     [facility]): [address] [place], [place], [address] [phone]     [facility]: [address], [place], [address], [phone]       Free Resources:  [place] [facility] ([facility])  [facility] connects people seeking recovery to resources that help foster and sustain long-term recovery. Whether you are seeking resources for treatment, transportation, housing, job training, education, health care or other pathways to recovery, [facility] is a great place to start.  Phone: [phone]. www.[place].org (Great listing of all types of recovery and non-recovery related resources)    Alcoholics Anonymous  Phone: 8-400-ALCOHOL  Website: HTTP://WWW.AA.ORG/  AA [place] (753-571-5514 or http://aamin[place].org)  AA [place] (805-748-4618 or www.aastpaul.org)     Narcotics Anonymous  Phone: 870.636.5074  Website: www.[employer].inc ([employer]).us.    [facility]  [address] [place] [address], [address], [place], [place],  Phone: [phone]    Drop-in Hours: Monday-Friday 9-11:30 am. By appointment at other  times.  Provides: [facility] is a drop-in center on the [facility] side [facility] that provides a safe space for individuals who are homeless and have a history of chemical use. Sobriety is not a requirement but drugs and alcohol are not allowed on the property.  Services: Non-clients can access drop-in services such as Recovery and Harm Reduction Groups, referrals to case management, community activities, shower facilities, and a pool table. Individuals who are homeless and have chemical health needs may be eligible for enrollment into [facility]'s case management program. Clients and  work together to access benefits, treatment, health care, shelter, and external housing resources.

## 2023-03-24 NOTE — ED PROVIDER NOTES
"EMERGENCY DEPARTMENT ENCOUNTER      NAME: Chandler Branch  AGE: 42 year old male  YOB: 1980  MRN: [id number]  EVALUATION DATE & TIME: 3/24/2023 10:44 AM    PCP: Mame Dougherty    ED PROVIDER: Little Rudolph PA-C    Chief Complaint   Patient presents with     Panic Attack     FINAL IMPRESSION:  1. Anxiety    2. Panic attack        MEDICAL DECISION MAKING:    Pertinent Labs & Imaging studies reviewed. (See chart for details)  Chandler Branch is a 42 year old male who presents for evaluation of 3 days of worsening anxiety and panic attack feelings.  Reports he has had increased stress in his life with work, being deported soon, his children and his ex-wife.  Does not feel he is coping well at home and has lost \"20 pounds in the past month\" and has started drinking more frequently.  Denies suicidal ideation or homicidal ideation.  Denies drug use.  Feels he needs to be admitted for psychiatry help.    On my initial evaluation, patient mildly hypertensive and tachycardic, but reports feeling very anxious.  On recheck of vitals he is both improved on discharge.  On physical exam, patient is awake, alert, is extremely anxious appearing and has pressured speech, he does not appear uncomfortable, ill or toxic however.  Heart sounds are tachycardic but without murmurs or extra beats.  Lungs are clear without wheezing or crackles.  No abdominal tenderness on palpation.  Patient does not appear tremulous, confused or agitated.    Differential diagnosis includes anxiety, panic attack, panic disorder, electrolyte imbalance, thyroid disorder, alcohol withdrawal ACS, PE.  Emergency department evaluation included basic labs in addition to magnesium, TSH, EKG, LFTs alcohol screen.  Patient was given 1 mg IV Ativan.    Electrolytes within normal limits.  TSH normal, so low suspicion for electrolyte abnormality or thyroid disorder as cause for his anxiety and panic symptoms..  EKG assuring without T wave inversion, ST " elevation or ST depression, no chest pain, low suspicion for ACS.  Not feeling short of breath, vitals reassuring, low suspicion for PE.  LFTs are elevated, however appears similar from previous.  His alcohol was 0.20, however does not appear to be withdrawing.    Initially given 1 mg Ativan for his anxiety and plan for DEC assessment.     Seen by Scott Arreola , who assessed the patient and did not feel patient needed inpatient psychiatry admission.  Did recommend for outpatient individual therapy and chemical health assessment.  Was given information for these.    After being seen by DEC, I discussed with the patient and he will try going home to manage his symptoms, I feel this is reasonable as he is not a threat to himself or others.  Will send home with Ativan and encouraged him to follow-up with his primary care to establish care with a psychiatrist.  No medical reason that he would require hospital admission at this point.    Hypertensive while here, history of hypertension, ran out of meds the past few days. Will give clonidine here.  Did send home with his former prescription of lisinopril hydrochlorothiazide as well.    Patient otherwise stable for discharge, his work-up and vitals are reassuring.  Low suspicion for any emergent process that would require further ER intervention or hospital admission.  Provided expectant management as well as strict return precautions.    Patient has had serial examinations and notes significant improvement.     Patient was discharged in stable condition with treatment plan as below. Instructed to follow up with primary care provider in 3 days and to establish care with a psychiatrist and return to the emergency department with any new or worsening of symptoms. Patient expressed understanding, feels comfortable, and is in agreement with this plan. All questions addressed prior to discharge.    Medical Decision Making    History:    Supplemental history from:  Documented in chart, if applicable    External Record(s) reviewed: Documented in chart, if applicable.    Work Up:    Chart documentation includes differential considered and any EKGs or imaging independently interpreted by provider, where specified.    In additional to work up documented, I considered the following work up: Documented in chart, if applicable.    External consultation:    Discussion of management with another provider: Documented in chart, if applicable    Complicating factors:    Care impacted by chronic illness: N/A    Care affected by social determinants of health: N/A    Disposition considerations: Discharge. I prescribed additional prescription strength medication(s) as charted. I considered admission, but discharged the patient after share decision making conversation.    ED COURSE:  11:01 AM  I reviewed the patient's chart. I met with the patient to gather history and to perform my initial exam.    I wore appropriate PPE during this encounter including: facemask & eye protection   11:14 AM I staffed this patient case with Dr. Andrade who agrees with plan at this time and will see the patient for their history, exam, and complete evaluation.  1:26 PM I spoke with Scott Arreola DEC .   1:41 PM I spoke with Dr. Andrade about the plan going forward.   1:42 PM I spoke with patient about the plan going forward and discharge.  We discussed plan for discharge including treatment plan, follow-up and return precautions to emergency department.  Patient voiced understanding and in agreement with this plan.    At the conclusion of the encounter I discussed the results of all of the tests and the disposition. The questions were answered. The patient or family acknowledged understanding and was agreeable with the care plan.     MEDICATIONS GIVEN IN THE EMERGENCY:  Medications   LORazepam (ATIVAN) injection 1 mg (1 mg Intravenous $Given 3/24/23 1129)   cloNIDine (CATAPRES) tablet 0.1 mg (0.1 mg Oral  "$Given 3/24/23 1221)     NEW PRESCRIPTIONS STARTED AT TODAY'S ER VISIT  Discharge Medication List as of 3/24/2023  2:09 PM      START taking these medications    Details   !! lisinopril-hydrochlorothiazide (ZESTORETIC) 10-12.5 MG tablet Take 1 tablet by mouth daily, Disp-20 tablet, R-0, Local Print      LORazepam (ATIVAN) 1 MG tablet Take 1 tablet (1 mg) by mouth every 6 hours as needed for anxiety, Disp-15 tablet, R-0, Local Print       !! - Potential duplicate medications found. Please discuss with provider.        =================================================================    HPI:    Patient information was obtained from: Patient    Use of Interpretor: N/A    Chandler Branch is a 42 year old male with a pertinent history of hypokalemia, hyponatremia, MARY, alcohol withdrawal syndrome, and anxiety who presents to this ED by private car for evaluation of a panic attack/feeling anxious.    Patient reports being awake since 3 AM this morning, as he has various stressors in his life at the moment. He reports losing 20 lbs in the past 3-4 weeks, as he has a lack of appetite. He mentions that he is a single father of three children and has been having a lot of \"baby momma drama.\" He also reports having major financial, work ([employer]), and family stress. He mentions that since the mother is not paying for anything, his finances have basically doubled and this has been hard for him to take on. He reports having troubles sleeping, and also how his alcohol intake has increased over the past few weeks, with 4 drinks last night, but none this AM. Patient also mentions running out of his blood pressure and cholesterol medications over the past few days, so he has not been taking these. Currently, patient notes that he is feeling beyond anxious, that his heart is \"racing out of his chest,\" and being \"on edge,\" and \"agitated.\" Patient reports that a few months back he was drinking too much, and was having the same " "anxiety. He does report that he got this under control. He also briefly mentions talking with a therapist through  1 source 2x in the past 2 days.     Patient noted many times that he has made sure that his children are taken care of properly, and not experiencing any of these \"issues.\" He notes that his father is going to take care of the children over the weekend.     Patient reports having a PCP -- Dr. Mame Aponte.     Denies being on any medications for anxiety. Denies any shortness of breath, chest pain, nausea, vomiting, or ideations of self harm/being suicidal.Denies any problems with thyroid.     Otherwise in normal state of health. No further concerns at this time.     REVIEW OF SYSTEMS:  Review of Systems   Constitutional: Positive for appetite change.   Respiratory: Negative for shortness of breath.    Cardiovascular: Negative for chest pain.   Gastrointestinal: Negative for nausea and vomiting.   Psychiatric/Behavioral: Positive for agitation and sleep disturbance. Negative for self-injury and suicidal ideas. The patient is nervous/anxious.    All other systems reviewed and are negative.     PAST MEDICAL HISTORY:  No past medical history on file.    PAST SURGICAL HISTORY:  Past Surgical History:   Procedure Laterality Date     BACK SURGERY       IR CERVICAL EPIDURAL STEROID INJECTION  4/21/2016       CURRENT MEDICATIONS:    No current facility-administered medications for this encounter.    Current Outpatient Medications:      lisinopril-hydrochlorothiazide (ZESTORETIC) 10-12.5 MG tablet, Take 1 tablet by mouth daily, Disp: 20 tablet, Rfl: 0     LORazepam (ATIVAN) 1 MG tablet, Take 1 tablet (1 mg) by mouth every 6 hours as needed for anxiety, Disp: 15 tablet, Rfl: 0     atorvastatin (LIPITOR) 40 MG tablet, Take 40 mg by mouth daily , Disp: , Rfl:      lisinopril-hydrochlorothiazide (PRINZIDE,ZESTORETIC) 10-12.5 mg per tablet, [LISINOPRIL-HYDROCHLOROTHIAZIDE (PRINZIDE,ZESTORETIC) 10-12.5 MG PER " TABLET] Take 1 tablet by mouth daily., Disp: , Rfl:      ondansetron (ZOFRAN) 4 MG tablet, Take 4 mg by mouth every 6 hours as needed for nausea, Disp: , Rfl:     ALLERGIES:  No Known Allergies    FAMILY HISTORY:  Family History   Problem Relation Age of Onset     Coronary Artery Disease Paternal Grandfather        SOCIAL HISTORY:   Social History     Socioeconomic History     Marital status: Single   Tobacco Use     Smoking status: Never   Substance and Sexual Activity     Alcohol use: Yes     Alcohol/week: 7.0 standard drinks     Comment: Alcoholic Drinks/day: One drink a day       VITALS:  Patient Vitals for the past 24 hrs:   BP Temp Pulse Resp SpO2 Height Weight   03/24/23 1345 (!) 161/107 -- 99 -- 96 % -- --   03/24/23 1330 (!) 151/89 -- 81 -- 93 % -- --   03/24/23 1315 (!) 160/114 -- 90 -- 95 % -- --   03/24/23 1300 (!) 173/101 -- 95 -- 95 % -- --   03/24/23 1245 (!) 162/105 -- 93 -- 97 % -- --   03/24/23 1230 (!) 165/110 -- 87 -- 94 % -- --   03/24/23 1215 (!) 176/116 -- 105 -- 95 % -- --   03/24/23 1200 (!) 165/104 -- 87 -- 94 % -- --   03/24/23 1145 (!) 173/110 -- 89 -- 97 % -- --   03/24/23 1130 (!) 175/111 -- 88 -- 95 % -- --   03/24/23 1032 (!) 164/107 98  F (36.7  C) 99 20 99 % 1.829 m (6') 90.7 kg (200 lb)       PHYSICAL EXAM    Constitutional: Well developed, Well nourished, NAD, extremely anxious appearing and has pressured speech, he does not appear uncomfortable, ill or toxic however  HENT: Normocephalic, Atraumatic, Bilateral external ears normal, Oropharynx normal, mucous membranes moist, Nose normal.   Neck: Normal range of motion, No tenderness, Supple, No stridor.  Eyes: PERRL, EOMI, Conjunctiva normal, No discharge.   Respiratory: Normal breath sounds, No respiratory distress, No wheezing, Speaks full sentences easily. No cough.  Cardiovascular: tachycardic, Regular rhythm, No murmurs, No rubs, No gallops. Chest wall nontender.  GI: Soft, No tenderness, No masses, No flank tenderness. No  rebound or guarding.  Musculoskeletal: 2+ DP pulses. No edema. No cyanosis, No clubbing. Good range of motion in all major joints. No tenderness to palpation or major deformities noted. No tenderness of the CTLS spine.   Integument: Warm, Dry, No erythema, No rash. No petechiae.  Neurologic: Alert & oriented x 3, Normal motor function, Normal sensory function, No focal deficits noted. Normal gait.  Psychiatric: Affect anxious, Judgment normal, Mood normal. Cooperative.    LAB:  All pertinent labs reviewed and interpreted.  Labs Ordered and Resulted from Time of ED Arrival to Time of ED Departure   CBC WITH PLATELETS - Abnormal       Result Value    WBC Count 3.5 (*)     RBC Count 5.18      Hemoglobin 16.4      Hematocrit 48.2      MCV 93      MCH 31.7      MCHC 34.0      RDW 12.3      Platelet Count 211     BASIC METABOLIC PANEL - Abnormal    Sodium 141      Potassium 3.9      Chloride 102      Carbon Dioxide (CO2) 26      Anion Gap 13      Urea Nitrogen 5.3 (*)     Creatinine 0.66 (*)     Calcium 9.6      Glucose 109 (*)     GFR Estimate >90     ETHYL ALCOHOL LEVEL - Abnormal    Alcohol ethyl 0.20 (*)    HEPATIC FUNCTION PANEL - Abnormal    Protein Total 8.4 (*)     Albumin 4.7      Bilirubin Total 0.5      Alkaline Phosphatase 111       (*)      (*)     Bilirubin Direct <0.20     TSH WITH FREE T4 REFLEX - Normal    TSH 1.17     MAGNESIUM - Normal    Magnesium 1.9         RADIOLOGY:  Reviewed all pertinent imaging. Please see official radiology report.  No orders to display       EKG:    Performed at: 11:13  Rate: 86   Impression: Normal sinus rhythm  Normal ECG    I have reviewed and interpreted the EKG(s) documented above along with Dr. Andrade.    PROCEDURES:   None     Diagnosis:  1. Anxiety    2. Panic attack        Mary VALDOVINOS, am serving as a scribe to document services personally performed by Little Rudolph PA-C based on my observation and the provider's statements to me. Little VALDOVINOS  JENAE Rudolph attest that Mary Edmondson is acting in a scribe capacity, has observed my performance of the services and has documented them in accordance with my direction.    Little Rudolph PA-C  Emergency Medicine  [facility]  3/24/2023       Little Rudolph PA-C  03/24/23 1736

## 2023-03-24 NOTE — Clinical Note
Chandler Branch was seen and treated in our emergency department on 3/24/2023.  He may return to work on 03/27/2023.       If you have any questions or concerns, please don't hesitate to call.      Little Rudolph PA-C

## 2023-03-24 NOTE — ED PROVIDER NOTES
Emergency Department Midlevel Supervisory Note     I personally saw the patient and performed a substantive portion of the visit including all aspects of the medical decision making.    ED Course:  11:15 AM  Little Rudolph PA-C staffed patient with me. I agree with their assessment and plan of management, and I will see the patient.  11:25 AM  I met with the patient to introduce myself, gather additional history, perform my initial exam, and discuss the plan.     Brief HPI:     Chandler Branch is a 42 year old male who presents for evaluation of a panic attack and anxiety. Patient has been having trouble sleeping and has been up since 3 AM. He notes various stressors including family, work, and financial.     I, Mary Edmondson, am serving as a scribe to document services personally performed by Adilson Andrade MD, based on my observations and the provider's statements to me.   I, Adilson Andrade MD attest that Mary Edmondson was acting in a scribe capacity, has observed my performance of the services and has documented them in accordance with my direction.    Brief Physical Exam: BP (!) 162/105   Pulse 93   Temp 98  F (36.7  C)   Resp 20   Ht 1.829 m (6')   Wt 90.7 kg (200 lb)   SpO2 97%   BMI 27.12 kg/m    Constitutional:  Alert, in no acute distress  EYES: Conjunctivae clear  HENT:  Atraumatic, normocephalic  Respiratory:  Respirations even, unlabored, in no acute respiratory distress  Cardiovascular:  Regular rate and rhythm, good peripheral perfusion  GI: Soft, nondistended, nontender, no palpable masses, no rebound, no guarding   Musculoskeletal:  No edema. No cyanosis. Range of motion major extremities intact.    Integument: Warm, Dry, No erythema, No rash.   Neurologic:  Alert & oriented, no focal deficits noted  Psych: Normal mood and affect     MDM:  Medical Decision Making    History:    Supplemental history from: Documented in chart, if applicable    External Record(s) reviewed: Documented in chart,  if applicable.    Work Up:    Chart documentation includes differential considered and any EKGs or imaging independently interpreted by provider, where specified.    In additional to work up documented, I considered the following work up: Documented in chart, if applicable.    External consultation:    Discussion of management with another provider: Documented in chart, if applicable    Complicating factors:    Care impacted by chronic illness: Other: Anxiety    Care affected by social determinants of health: N/A    Disposition considerations: Discharge. No recommendations on prescription strength medication(s). See documentation for any additional details.             No diagnosis found.    Labs and Imaging:  Results for orders placed or performed during the hospital encounter of 03/24/23   TSH with free T4 reflex   Result Value Ref Range    TSH 1.17 0.30 - 4.20 uIU/mL   CBC (+ platelets, no diff)   Result Value Ref Range    WBC Count 3.5 (L) 4.0 - 11.0 10e3/uL    RBC Count 5.18 4.40 - 5.90 10e6/uL    Hemoglobin 16.4 13.3 - 17.7 g/dL    Hematocrit 48.2 40.0 - 53.0 %    MCV 93 78 - 100 fL    MCH 31.7 26.5 - 33.0 pg    MCHC 34.0 31.5 - 36.5 g/dL    RDW 12.3 10.0 - 15.0 %    Platelet Count 211 150 - 450 10e3/uL   Basic metabolic panel   Result Value Ref Range    Sodium 141 136 - 145 mmol/L    Potassium 3.9 3.4 - 5.3 mmol/L    Chloride 102 98 - 107 mmol/L    Carbon Dioxide (CO2) 26 22 - 29 mmol/L    Anion Gap 13 7 - 15 mmol/L    Urea Nitrogen 5.3 (L) 6.0 - 20.0 mg/dL    Creatinine 0.66 (L) 0.67 - 1.17 mg/dL    Calcium 9.6 8.6 - 10.0 mg/dL    Glucose 109 (H) 70 - 99 mg/dL    GFR Estimate >90 >60 mL/min/1.73m2   Result Value Ref Range    Magnesium 1.9 1.7 - 2.3 mg/dL   Alcohol level blood   Result Value Ref Range    Alcohol ethyl 0.20 (H) <=0.01 g/dL   Extra Urine Collection   Result Value Ref Range    Hold Specimen JI    Hepatic function panel   Result Value Ref Range    Protein Total 8.4 (H) 6.4 - 8.3 g/dL    Albumin  4.7 3.5 - 5.2 g/dL    Bilirubin Total 0.5 <=1.2 mg/dL    Alkaline Phosphatase 111 40 - 129 U/L     (H) 10 - 50 U/L     (H) 10 - 50 U/L    Bilirubin Direct <0.20 0.00 - 0.30 mg/dL     I have reviewed the relevant laboratory and radiology studies    Procedures:  I was present for the key portions of this procedure: none    Adilson Andrade MD  [facility] EMERGENCY DEPARTMENT  [address] [place] [id number]  [phone]       Adilson Andrade MD  03/24/23 7316

## 2023-03-24 NOTE — ED TRIAGE NOTES
"Patient here for anxiety, denies SI, states he ran out of meds 2 days. Has dealt with anxiety for last 3 years, worse the last few weeks, has not been sleeping, increasing ETOH, \"I had 4 drinks last night\"       "

## 2023-03-27 LAB
ATRIAL RATE - MUSE: 86 BPM
DIASTOLIC BLOOD PRESSURE - MUSE: NORMAL MMHG
INTERPRETATION ECG - MUSE: NORMAL
P AXIS - MUSE: 62 DEGREES
PR INTERVAL - MUSE: 168 MS
QRS DURATION - MUSE: 96 MS
QT - MUSE: 364 MS
QTC - MUSE: 435 MS
R AXIS - MUSE: 20 DEGREES
SYSTOLIC BLOOD PRESSURE - MUSE: NORMAL MMHG
T AXIS - MUSE: 38 DEGREES
VENTRICULAR RATE- MUSE: 86 BPM

## 2023-05-13 ENCOUNTER — HEALTH MAINTENANCE LETTER (OUTPATIENT)
Age: 43
End: 2023-05-13

## 2023-06-02 ENCOUNTER — HOSPITAL ENCOUNTER (EMERGENCY)
Facility: HOSPITAL | Age: 43
Discharge: HOME | End: 2023-06-02
Attending: FAMILY MEDICINE | Admitting: FAMILY MEDICINE
Payer: COMMERCIAL

## 2023-06-02 VITALS
WEIGHT: 198 LBS | DIASTOLIC BLOOD PRESSURE: 109 MMHG | TEMPERATURE: 97.9 F | SYSTOLIC BLOOD PRESSURE: 179 MMHG | HEART RATE: 95 BPM | HEIGHT: 74 IN | BODY MASS INDEX: 25.41 KG/M2 | OXYGEN SATURATION: 98 % | RESPIRATION RATE: 16 BRPM

## 2023-06-02 DIAGNOSIS — K70.9 ALCOHOLIC LIVER DAMAGE (H): ICD-10-CM

## 2023-06-02 DIAGNOSIS — E87.1 HYPONATREMIA: ICD-10-CM

## 2023-06-02 DIAGNOSIS — E87.6 HYPOKALEMIA: ICD-10-CM

## 2023-06-02 DIAGNOSIS — F41.9 ANXIETY: ICD-10-CM

## 2023-06-02 DIAGNOSIS — F10.929 ALCOHOL INTOXICATION (H): ICD-10-CM

## 2023-06-02 LAB
ALBUMIN SERPL BCG-MCNC: 4.6 G/DL (ref 3.5–5.2)
ALP SERPL-CCNC: 86 U/L (ref 40–129)
ALT SERPL W P-5'-P-CCNC: 208 U/L (ref 10–50)
ANION GAP SERPL CALCULATED.3IONS-SCNC: 16 MMOL/L (ref 7–15)
AST SERPL W P-5'-P-CCNC: 280 U/L (ref 10–50)
BASOPHILS # BLD AUTO: 0.1 10E3/UL (ref 0–0.2)
BASOPHILS NFR BLD AUTO: 1 %
BILIRUB SERPL-MCNC: 0.5 MG/DL
BUN SERPL-MCNC: 4.3 MG/DL (ref 6–20)
CALCIUM SERPL-MCNC: 9.7 MG/DL (ref 8.6–10)
CHLORIDE SERPL-SCNC: 93 MMOL/L (ref 98–107)
CREAT SERPL-MCNC: 0.74 MG/DL (ref 0.67–1.17)
DEPRECATED HCO3 PLAS-SCNC: 23 MMOL/L (ref 22–29)
EOSINOPHIL # BLD AUTO: 0 10E3/UL (ref 0–0.7)
EOSINOPHIL NFR BLD AUTO: 1 %
ERYTHROCYTE [DISTWIDTH] IN BLOOD BY AUTOMATED COUNT: 11.8 % (ref 10–15)
ETHANOL SERPL-MCNC: 0.18 G/DL
GFR SERPL CREATININE-BSD FRML MDRD: >90 ML/MIN/1.73M2
GLUCOSE SERPL-MCNC: 152 MG/DL (ref 70–99)
HCT VFR BLD AUTO: 43.6 % (ref 40–53)
HGB BLD-MCNC: 15.3 G/DL (ref 13.3–17.7)
IMM GRANULOCYTES # BLD: 0 10E3/UL
IMM GRANULOCYTES NFR BLD: 0 %
LYMPHOCYTES # BLD AUTO: 0.7 10E3/UL (ref 0.8–5.3)
LYMPHOCYTES NFR BLD AUTO: 16 %
MAGNESIUM SERPL-MCNC: 1.8 MG/DL (ref 1.7–2.3)
MCH RBC QN AUTO: 31.8 PG (ref 26.5–33)
MCHC RBC AUTO-ENTMCNC: 35.1 G/DL (ref 31.5–36.5)
MCV RBC AUTO: 91 FL (ref 78–100)
MONOCYTES # BLD AUTO: 0.4 10E3/UL (ref 0–1.3)
MONOCYTES NFR BLD AUTO: 10 %
NEUTROPHILS # BLD AUTO: 2.9 10E3/UL (ref 1.6–8.3)
NEUTROPHILS NFR BLD AUTO: 72 %
NRBC # BLD AUTO: 0 10E3/UL
NRBC BLD AUTO-RTO: 0 /100
PLATELET # BLD AUTO: 189 10E3/UL (ref 150–450)
POTASSIUM SERPL-SCNC: 3 MMOL/L (ref 3.4–5.3)
PROT SERPL-MCNC: 8.3 G/DL (ref 6.4–8.3)
RBC # BLD AUTO: 4.81 10E6/UL (ref 4.4–5.9)
SODIUM SERPL-SCNC: 132 MMOL/L (ref 136–145)
WBC # BLD AUTO: 4.1 10E3/UL (ref 4–11)

## 2023-06-02 PROCEDURE — 36415 COLL VENOUS BLD VENIPUNCTURE: CPT | Performed by: NURSE PRACTITIONER

## 2023-06-02 PROCEDURE — 90791 PSYCH DIAGNOSTIC EVALUATION: CPT

## 2023-06-02 PROCEDURE — 250N000013 HC RX MED GY IP 250 OP 250 PS 637: Performed by: NURSE PRACTITIONER

## 2023-06-02 PROCEDURE — 96374 THER/PROPH/DIAG INJ IV PUSH: CPT

## 2023-06-02 PROCEDURE — 83735 ASSAY OF MAGNESIUM: CPT | Performed by: NURSE PRACTITIONER

## 2023-06-02 PROCEDURE — 82077 ASSAY SPEC XCP UR&BREATH IA: CPT | Performed by: NURSE PRACTITIONER

## 2023-06-02 PROCEDURE — 250N000011 HC RX IP 250 OP 636: Performed by: NURSE PRACTITIONER

## 2023-06-02 PROCEDURE — 85025 COMPLETE CBC W/AUTO DIFF WBC: CPT | Performed by: NURSE PRACTITIONER

## 2023-06-02 PROCEDURE — 80053 COMPREHEN METABOLIC PANEL: CPT | Performed by: NURSE PRACTITIONER

## 2023-06-02 PROCEDURE — 258N000003 HC RX IP 258 OP 636: Performed by: NURSE PRACTITIONER

## 2023-06-02 PROCEDURE — 96361 HYDRATE IV INFUSION ADD-ON: CPT

## 2023-06-02 PROCEDURE — 99285 EMERGENCY DEPT VISIT HI MDM: CPT | Mod: 25

## 2023-06-02 RX ORDER — POTASSIUM CHLORIDE 1500 MG/1
20 TABLET, EXTENDED RELEASE ORAL DAILY
Qty: 5 TABLET | Refills: 0 | Status: SHIPPED | OUTPATIENT
Start: 2023-06-02 | End: 2023-06-07

## 2023-06-02 RX ORDER — ATORVASTATIN CALCIUM 40 MG/1
40 TABLET, FILM COATED ORAL DAILY
Qty: 30 TABLET | Refills: 0 | Status: SHIPPED | OUTPATIENT
Start: 2023-06-02 | End: 2024-07-24

## 2023-06-02 RX ORDER — LORAZEPAM 2 MG/ML
1 INJECTION INTRAMUSCULAR ONCE
Status: COMPLETED | OUTPATIENT
Start: 2023-06-02 | End: 2023-06-02

## 2023-06-02 RX ORDER — LORAZEPAM 1 MG/1
1 TABLET ORAL EVERY 6 HOURS PRN
Qty: 8 TABLET | Refills: 0 | Status: SHIPPED | OUTPATIENT
Start: 2023-06-02 | End: 2024-07-24

## 2023-06-02 RX ORDER — POTASSIUM CHLORIDE 1500 MG/1
40 TABLET, EXTENDED RELEASE ORAL ONCE
Status: COMPLETED | OUTPATIENT
Start: 2023-06-02 | End: 2023-06-02

## 2023-06-02 RX ORDER — LISINOPRIL/HYDROCHLOROTHIAZIDE 10-12.5 MG
1 TABLET ORAL DAILY
Qty: 30 TABLET | Refills: 0 | Status: SHIPPED | OUTPATIENT
Start: 2023-06-02

## 2023-06-02 RX ADMIN — SODIUM CHLORIDE 1000 ML: 9 INJECTION, SOLUTION INTRAVENOUS at 09:38

## 2023-06-02 RX ADMIN — POTASSIUM CHLORIDE 40 MEQ: 1500 TABLET, EXTENDED RELEASE ORAL at 09:38

## 2023-06-02 RX ADMIN — SODIUM CHLORIDE 1000 ML: 9 INJECTION, SOLUTION INTRAVENOUS at 09:03

## 2023-06-02 RX ADMIN — LORAZEPAM 1 MG: 2 INJECTION INTRAMUSCULAR; INTRAVENOUS at 09:08

## 2023-06-02 ASSESSMENT — ACTIVITIES OF DAILY LIVING (ADL)
ADLS_ACUITY_SCORE: 35

## 2023-06-02 ASSESSMENT — ENCOUNTER SYMPTOMS
BLOOD IN STOOL: 1
VOMITING: 1
ROS GI COMMENTS: DENIES HEMATEMESIS.
NERVOUS/ANXIOUS: 1
PALPITATIONS: 1
INSOMNIA: 1

## 2023-06-02 ASSESSMENT — COLUMBIA-SUICIDE SEVERITY RATING SCALE - C-SSRS
1. HAVE YOU WISHED YOU WERE DEAD OR WISHED YOU COULD GO TO SLEEP AND NOT WAKE UP?: NO
TOTAL  NUMBER OF INTERRUPTED ATTEMPTS LIFETIME: NO
ATTEMPT LIFETIME: NO
6. HAVE YOU EVER DONE ANYTHING, STARTED TO DO ANYTHING, OR PREPARED TO DO ANYTHING TO END YOUR LIFE?: NO
2. HAVE YOU ACTUALLY HAD ANY THOUGHTS OF KILLING YOURSELF?: NO
TOTAL  NUMBER OF ABORTED OR SELF INTERRUPTED ATTEMPTS LIFETIME: NO

## 2023-06-02 ASSESSMENT — LIFESTYLE VARIABLES: SUBSTANCE_ABUSE: 1

## 2023-06-02 NOTE — CONSULTS
"Diagnostic Evaluation Consultation  Crisis Assessment    Patient was assessed: Iva  Patient location: [place]  Was a release of information signed: Not at this time.     Referral Data and Chief Complaint  Chandler is a 43 year old, who uses he/him pronouns, and presents to the ED alone. Patient is referred to the ED by self. Patient is presenting to the ED for the following concerns: Patient identifies multiple mental health concerns and alcohol use.      Informed Consent and Assessment Methods     Patient is his own guardian. Writer met with patient and explained the crisis assessment process, including applicable information disclosures and limits to confidentiality, assessed understanding of the process, and obtained consent to proceed with the assessment. Patient was observed to be able to participate in the assessment as evidenced by verbal consent. Assessment methods included conducting a formal interview with patient, review of medical records, collaboration with medical staff, and obtaining relevant collateral information from family and community providers when available..     Over the course of this crisis assessment provided reassurance, offered validation and provided psychoeducation. Patient's response to interventions was minimally engaged and minimally responsive.  Patient struggled to engage in the assessment process and to elaborate when asked clarifying questions.     Summary of Patient Situation    Patient reports significant stress at home with three young children and financial concerns.  Patient reports this has been occurring for \"years\".  Patient unable to describe current situation and continued to request mental health admission.    Brief Psychosocial History    Patient reports having three children and lives with them when they are not with their mother.    Patient denies legal issues.  Patient reports they are currently employed though had to call in today and reports employment as a " "significant amount of stress.  Patient was unable to elaborate work related stress.  Patient endorses significant financial stress though unable to elaborate specific concerns.    Significant Clinical History    Patient denies mental health history though reports \"talking to somebody\".  Patient reports they were not very attached to this person.  Patient denies psychiatric hospitalization.  Patient denies medications for mental health reasons. Patient reports they have not been sleeping well and have not been taking medications related to medical conditions.  Patient reports they have low motivation.    Collateral Information    The following information was received from Andrés whose relationship to the patient is father. Information was obtained via phone. Their phone number is [phone].  Patients father reports they will call back to discuss.    Risk Assessment    Manning Suicide Severity Rating Scale Full Clinical Version:  Suicidal Ideation  1. Wish to be Dead (Lifetime): No  2. Non-Specific Active Suicidal Thoughts (Lifetime): No     Suicidal Behavior  Actual Attempt (Lifetime): No  Has subject engaged in non-suicidal self-injurious behavior? (Lifetime): No  Interrupted Attempts (Lifetime): No  Aborted or Self-Interrupted Attempt (Lifetime): No  Preparatory Acts or Behavior (Lifetime): No  C-SSRS Risk (Lifetime/Recent)  Calculated C-SSRS Risk Score (Lifetime/Recent): No Risk Indicated      Validity of evaluation is impacted by presenting factors during interview Alcohol Use.   Comments regarding subjective versus objective responses to Manning tool: None at this time  Environmental or Psychosocial Events: work or task failure, challenging interpersonal relationships, helplessness/hopelessness, other life stressors and ongoing abuse of substances  Chronic Risk Factors: chronic and ongoing sleep difficulties   Warning Signs: hopelessness, increasing substance use or abuse and recent losses (physical, " financial, personal)  Protective Factors: strong bond to family unit, community support, or employment, responsibilities and duties to others, including pets and children, lives in a responsibly safe and stable environment and able to access care without barriers  Interpretation of Risk Scoring, Risk Mitigation Interventions and Safety Plan:  Patient denies thoughts of self harm and suicidal ideation though reports they do not feel safe at home.  Patient unable to elaborate as to why.    Does the patient have thoughts of harming others? No     Is the patient engaging in sexually inappropriate behavior?  no        Current Substance Abuse     Is there recent substance abuse? Substance type(s): Alcohol Frequency: Daily Quantity: a few drinks Method: oral Duration: varies Last use: 6/2/23     Was a urine drug screen or blood alcohol level obtained: Yes PRADIP 0.18 at 900 am       Mental Status Exam     Affect: Appropriate   Appearance: Appropriate    Attention Span/Concentration: Attentive  Eye Contact: Engaged   Fund of Knowledge: Appropriate    Language /Speech Content: Fluent   Language /Speech Volume: Normal    Language /Speech Rate/Productions: Normal    Recent Memory: Intact   Remote Memory: Intact   Mood: Normal    Orientation to Person: Yes    Orientation to Place: Yes   Orientation to Time of Day: Yes    Orientation to Date: Yes    Situation (Do they understand why they are here?): Yes    Psychomotor Behavior: Normal    Thought Content: Clear   Thought Form: Intact      History of commitment: No       Medication    Psychotropic medications: No  Medication changes made in the last two weeks: No       Current Care Team    Primary Care Provider: unknown  Psychiatrist: No  Therapist: No  : No     CTSS or ARMHS: No  ACT Team: No  Other: No      Diagnosis    Substance-Related & Addictive Disorders 291.9 (F10.99) Unspecified Alcohol Related Disorder   300.00 (F41.9) Unspecified Anxiety Disorder - by history  "    Clinical Summary and Substantiation of Recommendations    Patient presents in the ED in context of alcohol use and mental health concerns.  Patients PRADIP at 900 am was 0.18.  Patient was unable to described specific mental health concerns and denies thoughts of self harm, suicidal ideation, homicidal ideation.  Patient denies auditory and visual hallucinations.  Patient is requesting mental health admission to get \"a break\" for a few days and then would like to return home.  Patient states that he does not feel safe at home currently and would after a few days of hospitalization.  Patient was unable to clarify why his home is unsafe or why he feels unsafe at home currently but would not in a few days.  Patient reports daily use of alcohol that varies from 3 drinks to \"too many\".  Patient not open to detox admission as patient reports that \"I feel like I am committing a crime\" referencing a [facility] detox admission.  Patient struggled to comprehend purpose of detox and that typically, there are not legal ramifications.  Patient denied need for detox admission.  Due to patients current level of alcohol use and intoxication, patient will be placed in observation and reassessed at a later time.  An inpatient mental health admission is not appropriate at this time and patient would benefit from outpatient substance use and mental health resources at discharge such as a CD assessment, psychiatry, therapy if appropriate at reassessment.  Reassessment would also be beneficial for patient to discuss concerns with feeling unsafe at home and concerns with potential for a detox admission.    Disposition    Recommended disposition: Other: Observation       Reviewed case and recommendations with attending provider. Attending Name: Dr King       Attending concurs with disposition: Yes       Patient and/or validated legal guardian concurs with disposition: Yes       Final disposition: Other: Observation.           Assessment " Details    Patient interview started at: 1200 and completed at: 1300.     Total duration spent on the patient case in minutes: 1.0 hrs      CPT code(s) utilized: 45053 - Psychotherapy for Crisis - 60 (30-74*) min       ANIA Rangel, MS, LPCC, LADC, Psychotherapist  DEC - Triage & Transition Services  Callback: [phone]

## 2023-06-02 NOTE — ED PROVIDER NOTES
EMERGENCY DEPARTMENT ENCOUNTER      NAME: Chandler Branch  AGE: 43 year old male  YOB: 1980  MRN: [id number]  EVALUATION DATE & TIME: 2023  8:25 AM    PCP: Mame Dougherty    ED PROVIDER: TANISHA Gomez, CNP      Chief Complaint   Patient presents with     Panic Attack     Alcohol Problem         FINAL IMPRESSION:  1. Alcohol intoxication (H)    2. Hypokalemia    3. Alcoholic liver damage (H)    4. Hyponatremia    5. Anxiety          ED COURSE & MEDICAL DECISION MAKIN:42 AM I met with the patient, obtained history, performed an initial exam, and discussed options and plan for treatment here in the ED.  8:53 AM I staffed the patient with Dr. King.  9:10 AM Nursing staff updated me. Patient would like to talk to me again.  9:12 AM Reevaluated and updated patient.  10:14 AM Reevaluated and updated patient.  12:47 PM Spoke with Elena CORTES LPCC.  1:18 PM Spoke with Elena CORTES LPCC. Plan to reassess patient in a couple of hours and then make a recommendation.  1:19 PM Reevaluated the patient.  3:52 PM Nursing staff updated me. Patient says that he would like to go home.  3:52 PM Reevaluated the patient.    Pertinent Labs & Imaging studies reviewed. (See chart for details)  43 year old male presents to the Emergency Department for evaluation of alcohol intoxication anxiety.  Denies any suicidal or homicidal ideation.  Did appear anxious upon initial evaluation.  Was eventually given some IV Ativan along with IV fluids.  Did not appear significantly intoxicated.  Blood alcohol level did return at 0.180.  Other labs notable for some mild hyponatremia with sodium of 132 and hypokalemia with potassium of 3.0.  Slight elevation of anion gap at 16 but no evidence for acidosis.  Also, evidence for alcoholic liver disease.  All these findings were discussed with the patient.  We discussed detox, however he declined.  No indications for any acute admission for medical  stabilization.  Patient was evaluated by DEC.  Patient eventually decided that he was amenable to discharge.  At the time of discharge, he appeared clinically sober and had appropriate decision-making capacity.  Given resources for came to have assessment/will 25.  Given refills on his antihypertensives and cholesterol medication.  Also given a prescription for 8 tablets of Ativan to help with sleep and any withdrawal.  His father is going to give him a ride home.  He plans to leave his car here overnight.  Advised him to follow-up with his PCP for additional assistance but he can return here at any point should he have additional concerns      Medical Decision Making    History:     Supplemental history from: Documented in chart, if applicable    External Record(s) reviewed: Documented in chart, if applicable.    Work Up:    Chart documentation includes differential considered and any EKGs or imaging independently interpreted by provider, where specified.    In additional to work up documented, I considered the following work up: Documented in chart, if applicable.    External consultation:    Discussion of management with another provider: Mental Health Crisis Clinician    Complicating factors:    Care impacted by chronic illness: Hyperlipidemia, Hypertension and Mental Health    Care affected by social determinants of health: Alcohol Abuse and/or Recreational Drug Use    Disposition considerations: Discharge. I prescribed additional prescription strength medication(s) as charted. See documentation for any additional details.        At the conclusion of the encounter I discussed the results of all of the tests and the disposition. The questions were answered. The patient or family acknowledged understanding and was agreeable with the care plan.       0 minutes of critical care time     MEDICATIONS GIVEN IN THE EMERGENCY:  Medications   0.9% sodium chloride BOLUS (0 mLs Intravenous Stopped 6/2/23 0934)   LORazepam  "(ATIVAN) injection 1 mg (1 mg Intravenous $Given 6/2/23 0908)   potassium chloride ER (KLOR-CON M) CR tablet 40 mEq (40 mEq Oral $Given 6/2/23 0938)   0.9% sodium chloride BOLUS (0 mLs Intravenous Stopped 6/2/23 1022)       NEW PRESCRIPTIONS STARTED AT TODAY'S ER VISIT  New Prescriptions    LORAZEPAM (ATIVAN) 1 MG TABLET    Take 1 tablet (1 mg) by mouth every 6 hours as needed for withdrawal    POTASSIUM CHLORIDE ER (K-TAB) 20 MEQ CR TABLET    Take 1 tablet (20 mEq) by mouth daily for 5 days            =================================================================    Patient information was obtained from: Patient     Use of Intrepreter: N/A    Limitations to History: None     HPI    Chandler Branch is a 43 year old male with a history of hyperlipidemia, hypertension, anxiety, alcohol withdrawal syndrome with complication, who presents panic attack and alcohol problem.    Per chart review, patient was seen at [facility] ED on 3/24/2023 for evaluation of panic attack. Patient reported worsening anxiety and stress from life, children, ex-wife, and deportation. He has been drinking more frequently but denies associating homicidal or suicidal ideation. On exam, patient was mildy hypertensive and tachycardic. His labs were insignificant. He was discharged after talking to DEC with follow up with PCP and psychiatrist.    Patient reports that his stress and anxiety has progressively worsened since last ED visit a few months ago and has been using more alcohol lately. He says since the last visit, he hasn't followed up with psychiatrist or PCP. He has a history of alcohol dependence for the past 25 years. He says the main stressors are his kids, the , finances, his divorce, and \"just life.\" He reports worsening anxiety with associating palpitations (\"heart racing\"), vomiting, decreased appetite (3-4 days), and decrease in sleep. He also reports \"sporadic\" episodes of black stool. Last night, he had a few cocktails " "and still currently feels intoxicated. He chose to come into ED today because he \"was scared and doesn't want to hurt anyone else.\" He says he \"wants to get help\" and wants to be in a detox program.    He denies associating hematemesis, suicidal ideation, or homicidal ideation. He denies allergies to medications. He has not been compliant with taking his hypertension and hyperlipidemia medications for the past few months. Of note, he has support from his father. He repeatedly states he's \"never been in a trouble with the law.\"    There were no other concerns/complaints at this time.    SHx: He is currently in the  (25 years). He is a single father. He is .    Review of Systems   Constitutional:        Endorses decrease in appetite (3-4 days).   Cardiovascular: Positive for palpitations (heart racing secondary to anxiety).   Gastrointestinal: Positive for blood in stool (sporadically, dark) and vomiting (secondary to stress).        Denies hematemesis.   Psychiatric/Behavioral: Positive for substance abuse (alcohol). Negative for suicidal ideas. The patient is nervous/anxious and has insomnia.         Denies homicidal ideation.   All other systems reviewed and are negative.      PAST MEDICAL HISTORY:  Past Medical History:   Diagnosis Date     Accelerated hypertension      Acute kidney injury (H) 2/23/2022     Alcohol withdrawal syndrome with complication (H) 2/23/2022     Cervical radiculopathy 4/19/2016     Hypokalemia      Hyponatremia 2/23/2022       PAST SURGICAL HISTORY:  Past Surgical History:   Procedure Laterality Date     BACK SURGERY       IR CERVICAL EPIDURAL STEROID INJECTION  4/21/2016           CURRENT MEDICATIONS:    No current facility-administered medications for this encounter.     Current Outpatient Medications   Medication     atorvastatin (LIPITOR) 40 MG tablet     lisinopril-hydrochlorothiazide (ZESTORETIC) 10-12.5 MG tablet     LORazepam (ATIVAN) 1 MG tablet     potassium " "chloride ER (K-TAB) 20 MEQ CR tablet     ondansetron (ZOFRAN) 4 MG tablet         ALLERGIES:  No Known Allergies      VITALS:  Patient Vitals for the past 24 hrs:   BP Temp Temp src Pulse Resp SpO2 Height Weight   06/02/23 1000 (!) 179/109 -- -- 95 -- 98 % -- --   06/02/23 0945 -- -- -- 100 -- 97 % -- --   06/02/23 0930 (!) 173/105 -- -- 88 -- 94 % -- --   06/02/23 0915 -- -- -- 100 -- 97 % -- --   06/02/23 0900 (!) 172/111 -- -- 97 -- 90 % -- --   06/02/23 0819 (!) 174/119 97.9  F (36.6  C) Oral 112 16 97 % 1.88 m (6' 2\") 89.8 kg (198 lb)       PHYSICAL EXAM    Constitutional:  alert, no distress.  Anxious appearing.  EYES: Conjunctivae clear  HENT:  Atraumatic, normocephalic  Respiratory:  No respiratory distress, normal breath sounds  Cardiovascular:  tachycardic, normal rhythm, no murmurs  GI:  Soft, nondistended, nontender, no palpable masses, no rebound, no guarding   Integument: Warm, Dry  Neurologic:  Alert & oriented x 3              LAB:  All pertinent labs reviewed and interpreted.  Labs Ordered and Resulted from Time of ED Arrival to Time of ED Departure   COMPREHENSIVE METABOLIC PANEL - Abnormal       Result Value    Sodium 132 (*)     Potassium 3.0 (*)     Chloride 93 (*)     Carbon Dioxide (CO2) 23      Anion Gap 16 (*)     Urea Nitrogen 4.3 (*)     Creatinine 0.74      Calcium 9.7      Glucose 152 (*)     Alkaline Phosphatase 86       (*)      (*)     Protein Total 8.3      Albumin 4.6      Bilirubin Total 0.5      GFR Estimate >90     CBC WITH PLATELETS AND DIFFERENTIAL - Abnormal    WBC Count 4.1      RBC Count 4.81      Hemoglobin 15.3      Hematocrit 43.6      MCV 91      MCH 31.8      MCHC 35.1      RDW 11.8      Platelet Count 189      % Neutrophils 72      % Lymphocytes 16      % Monocytes 10      % Eosinophils 1      % Basophils 1      % Immature Granulocytes 0      NRBCs per 100 WBC 0      Absolute Neutrophils 2.9      Absolute Lymphocytes 0.7 (*)     Absolute Monocytes 0.4   "    Absolute Eosinophils 0.0      Absolute Basophils 0.1      Absolute Immature Granulocytes 0.0      Absolute NRBCs 0.0     ETHYL ALCOHOL LEVEL - Abnormal    Alcohol ethyl 0.18 (*)    MAGNESIUM - Normal    Magnesium 1.8           RADIOLOGY:  Reviewed all pertinent imaging. Please see official radiology report.  No orders to display             PROCEDURES:   None      I, Loren Lopez, am serving as a scribe to document services personally performed by Pasha Saenz CNP. based on my observation and the provider's statements to me. IPasha CNP attest that Loren Lopez is acting in a scribe capacity, has observed my performance of the services and has documented them in accordance with my direction.    TANISHA Gomez, CNP  Emergency Services  [facility] EMERGENCY DEPARTMENT  [address] [place] [id number]  [phone]  Dept: [phone]         Pasha Saenz APRN CNP  06/02/23 9078

## 2023-06-02 NOTE — ED PROVIDER NOTES
"Emergency Department Midlevel Supervisory Note    Date/Time:6/2/2023 9:13 AM    I personally saw the patient and performed a substantive portion of the visit including all aspects of the medical decision making.    I am seeing this patient along with Pasha Saenz CNP.  I, Geraldo King M.D. have reviewed the documentation, personally taken the patient's history, performed an exam and agree with the physical finds, diagnosis and management plan.        8:53 AM Prior records were reviewed.  I personally performed history and physical.  I personally reviewed lab, EKG, and radiology results as indicated.  Care was discussed with mid-level provider.  Diagnosis and disposition were discussed.  Final disposition will be per the JAYNE depending diagnostic studies and patient's clinical trajectory.  9:35 AM labs independently interpreted by me with mild hypokalemia which will be replaced orally, normal magnesium, CBC reassuring.  No medical indication for admission, encourage detox    DIAGNOSIS:  1. Alcohol intoxication (H)    2. Hypokalemia    3. Alcoholic liver damage (H)        New Prescriptions    No medications on file       HPI: 43-year-old male with history of alcohol dependence presenting asking for detox.  No history of withdrawal seizures.  Physical Exam:BP (!) 173/105   Pulse 88   Temp 97.9  F (36.6  C) (Oral)   Resp 16   Ht 1.88 m (6' 2\")   Wt 89.8 kg (198 lb)   SpO2 94%   BMI 25.42 kg/m    Physical Exam  Vitals and nursing note reviewed.   Constitutional:       Appearance: Normal appearance.   HENT:      Head: Normocephalic and atraumatic.      Right Ear: External ear normal.      Left Ear: External ear normal.      Nose: Nose normal.   Eyes:      Extraocular Movements: Extraocular movements intact.      Conjunctiva/sclera: Conjunctivae normal.      Pupils: Pupils are equal, round, and reactive to light.   Pulmonary:      Effort: Pulmonary effort is normal.   Musculoskeletal:         General: No " swelling or deformity. Normal range of motion.      Cervical back: Normal range of motion.   Neurological:      General: No focal deficit present.      Mental Status: He is alert and oriented to person, place, and time. Mental status is at baseline.   Psychiatric:         Mood and Affect: Mood normal.         Behavior: Behavior normal.         Thought Content: Thought content normal.         Results for orders placed or performed during the hospital encounter of 06/02/23   Comprehensive metabolic panel   Result Value Ref Range    Sodium 132 (L) 136 - 145 mmol/L    Potassium 3.0 (L) 3.4 - 5.3 mmol/L    Chloride 93 (L) 98 - 107 mmol/L    Carbon Dioxide (CO2) 23 22 - 29 mmol/L    Anion Gap 16 (H) 7 - 15 mmol/L    Urea Nitrogen 4.3 (L) 6.0 - 20.0 mg/dL    Creatinine 0.74 0.67 - 1.17 mg/dL    Calcium 9.7 8.6 - 10.0 mg/dL    Glucose 152 (H) 70 - 99 mg/dL    Alkaline Phosphatase 86 40 - 129 U/L     (H) 10 - 50 U/L     (H) 10 - 50 U/L    Protein Total 8.3 6.4 - 8.3 g/dL    Albumin 4.6 3.5 - 5.2 g/dL    Bilirubin Total 0.5 <=1.2 mg/dL    GFR Estimate >90 >60 mL/min/1.73m2   Result Value Ref Range    Magnesium 1.8 1.7 - 2.3 mg/dL   CBC with platelets and differential   Result Value Ref Range    WBC Count 4.1 4.0 - 11.0 10e3/uL    RBC Count 4.81 4.40 - 5.90 10e6/uL    Hemoglobin 15.3 13.3 - 17.7 g/dL    Hematocrit 43.6 40.0 - 53.0 %    MCV 91 78 - 100 fL    MCH 31.8 26.5 - 33.0 pg    MCHC 35.1 31.5 - 36.5 g/dL    RDW 11.8 10.0 - 15.0 %    Platelet Count 189 150 - 450 10e3/uL    % Neutrophils 72 %    % Lymphocytes 16 %    % Monocytes 10 %    % Eosinophils 1 %    % Basophils 1 %    % Immature Granulocytes 0 %    NRBCs per 100 WBC 0 <1 /100    Absolute Neutrophils 2.9 1.6 - 8.3 10e3/uL    Absolute Lymphocytes 0.7 (L) 0.8 - 5.3 10e3/uL    Absolute Monocytes 0.4 0.0 - 1.3 10e3/uL    Absolute Eosinophils 0.0 0.0 - 0.7 10e3/uL    Absolute Basophils 0.1 0.0 - 0.2 10e3/uL    Absolute Immature Granulocytes 0.0 <=0.4  10e3/uL    Absolute NRBCs 0.0 10e3/uL   Alcohol level blood   Result Value Ref Range    Alcohol ethyl 0.18 (H) <=0.01 g/dL           Geraldo King M.D.  Emergency Medicine  [facility] EMERGENCY DEPARTMENT  [address] [place] [id number] [phone]  Dept: [phone]             Geraldo King MD  06/02/23 0937

## 2023-06-02 NOTE — ED NOTES
Pt states his last drink was 0300 this morning. Pt is feeling anxious and seeking help for his drinking and anxiety. Pt states he does not have seizures when he withdrawals. Pt feels a private room would be more appropriate to help with his anxiety and privacy.

## 2023-06-02 NOTE — ED NOTES
"Patient had apparently spoken to the provider about going to a \"detox facility.\" When he was asking about next steps I mentioned waiting for blood work and calling around for a bed at detox. When I mentioned detox facilities are run by the [facility] he suddenly was concerned. It seems there was some miscommunication about a detox facility was in his mind. The patient has no desire to go to a detox facility. He wants to speak to the provider again about a plan.  "

## 2023-06-02 NOTE — ED TRIAGE NOTES
Patient c/o severe anxiety, alcohol dependency, loss of appetite, lack of sleep, stress and anxiety.  Denied suicidal.      Triage Assessment     Row Name 06/02/23 0822       Triage Assessment (Adult)    Airway WDL WDL       Respiratory WDL    Respiratory WDL WDL       Skin Circulation/Temperature WDL    Skin Circulation/Temperature WDL WDL       Cardiac WDL    Cardiac WDL WDL       Peripheral/Neurovascular WDL    Peripheral Neurovascular WDL WDL       Cognitive/Neuro/Behavioral WDL    Cognitive/Neuro/Behavioral WDL WDL

## 2023-06-02 NOTE — ED NOTES
EMERGENCY DEPARTMENT SIGN OUT NOTE        ED COURSE AND MEDICAL DECISION MAKING  3:00 PM Patient was signed out to me by Dr Geraldo King.  The patient was awaiting a reevaluation by the DEC team here.  He had wanted to be admitted to sober up and refused to go to detox.  His alcohol level this morning was 0.18.  The patient was awaiting the reevaluation when he changed his mind and decided to go home instead.  He has someone coming to pick him up and will be provided a small prescription of Ativan to help with sleep and withdrawal symptoms.  Andrés Saenz discharged him.       FINAL IMPRESSION    1. Alcohol intoxication (H)    2. Hypokalemia    3. Alcoholic liver damage (H)    4. Hyponatremia    5. Anxiety        ED MEDS  Medications   0.9% sodium chloride BOLUS (0 mLs Intravenous Stopped 6/2/23 0934)   LORazepam (ATIVAN) injection 1 mg (1 mg Intravenous $Given 6/2/23 0908)   potassium chloride ER (KLOR-CON M) CR tablet 40 mEq (40 mEq Oral $Given 6/2/23 0938)   0.9% sodium chloride BOLUS (0 mLs Intravenous Stopped 6/2/23 1022)       LAB  Labs Ordered and Resulted from Time of ED Arrival to Time of ED Departure   COMPREHENSIVE METABOLIC PANEL - Abnormal       Result Value    Sodium 132 (*)     Potassium 3.0 (*)     Chloride 93 (*)     Carbon Dioxide (CO2) 23      Anion Gap 16 (*)     Urea Nitrogen 4.3 (*)     Creatinine 0.74      Calcium 9.7      Glucose 152 (*)     Alkaline Phosphatase 86       (*)      (*)     Protein Total 8.3      Albumin 4.6      Bilirubin Total 0.5      GFR Estimate >90     CBC WITH PLATELETS AND DIFFERENTIAL - Abnormal    WBC Count 4.1      RBC Count 4.81      Hemoglobin 15.3      Hematocrit 43.6      MCV 91      MCH 31.8      MCHC 35.1      RDW 11.8      Platelet Count 189      % Neutrophils 72      % Lymphocytes 16      % Monocytes 10      % Eosinophils 1      % Basophils 1      % Immature Granulocytes 0      NRBCs per 100 WBC 0      Absolute Neutrophils 2.9      Absolute  Lymphocytes 0.7 (*)     Absolute Monocytes 0.4      Absolute Eosinophils 0.0      Absolute Basophils 0.1      Absolute Immature Granulocytes 0.0      Absolute NRBCs 0.0     ETHYL ALCOHOL LEVEL - Abnormal    Alcohol ethyl 0.18 (*)    MAGNESIUM - Normal    Magnesium 1.8         EKG      RADIOLOGY    No orders to display       DISCHARGE MEDS  New Prescriptions    LORAZEPAM (ATIVAN) 1 MG TABLET    Take 1 tablet (1 mg) by mouth every 6 hours as needed for withdrawal    POTASSIUM CHLORIDE ER (K-TAB) 20 MEQ CR TABLET    Take 1 tablet (20 mEq) by mouth daily for 5 days       Adilson Andrade MD  [facility] EMERGENCY DEPARTMENT  [facility] [facility] [address]  [phone]     Adilson Andrade MD  06/02/23 8668

## 2023-06-02 NOTE — DISCHARGE INSTRUCTIONS
Refills on your cholesterol medicine and blood pressure medicine were sent to your pharmacy.    Your potassium level was slightly low today.  A potassium supplement was sent to your pharmacy.  Please take this as prescribed.    Take Ativan to help you with sleep and to help with any withdrawal symptoms.  You should wait at least 8 hours after taking Ativan before you drive.  Do not take this medication with alcohol.    Please call the numbers below to schedule a chemical dependency assessment.      *[facility] Intake  (type CD intake in the search field)  [email or web address]  [phone]   inpatient services [phone]  or [phone] To arrange an appointment with CD counselor        [place]  [facility] ([facility])  [email or web address]   [phone] [address] [facility]  Residential drug and alcohol programs serving teens and adults from all ethnic, socioeconomic and Rastafarian backgrounds       [facility]    [phone] [place] site [email or web address]    If at any point you feel unsafe or have other concerns, please return to the emergency department for further care

## 2023-08-23 ENCOUNTER — LAB REQUISITION (OUTPATIENT)
Dept: LAB | Facility: CLINIC | Age: 43
End: 2023-08-23

## 2023-08-23 DIAGNOSIS — E78.5 HYPERLIPIDEMIA, UNSPECIFIED: ICD-10-CM

## 2023-08-23 DIAGNOSIS — I10 ESSENTIAL (PRIMARY) HYPERTENSION: ICD-10-CM

## 2023-08-23 DIAGNOSIS — R63.4 ABNORMAL WEIGHT LOSS: ICD-10-CM

## 2023-08-23 DIAGNOSIS — R10.9 UNSPECIFIED ABDOMINAL PAIN: ICD-10-CM

## 2023-08-23 LAB
ERYTHROCYTE [DISTWIDTH] IN BLOOD BY AUTOMATED COUNT: 13.5 % (ref 10–15)
HCT VFR BLD AUTO: 42.6 % (ref 40–53)
HGB BLD-MCNC: 14.1 G/DL (ref 13.3–17.7)
MCH RBC QN AUTO: 31.6 PG (ref 26.5–33)
MCHC RBC AUTO-ENTMCNC: 33.1 G/DL (ref 31.5–36.5)
MCV RBC AUTO: 96 FL (ref 78–100)
PLATELET # BLD AUTO: 225 10E3/UL (ref 150–450)
RBC # BLD AUTO: 4.46 10E6/UL (ref 4.4–5.9)
WBC # BLD AUTO: 3.8 10E3/UL (ref 4–11)

## 2023-08-23 PROCEDURE — 84443 ASSAY THYROID STIM HORMONE: CPT | Performed by: FAMILY MEDICINE

## 2023-08-23 PROCEDURE — 85027 COMPLETE CBC AUTOMATED: CPT | Performed by: FAMILY MEDICINE

## 2023-08-23 PROCEDURE — 83690 ASSAY OF LIPASE: CPT | Performed by: FAMILY MEDICINE

## 2023-08-23 PROCEDURE — 80053 COMPREHEN METABOLIC PANEL: CPT | Performed by: FAMILY MEDICINE

## 2023-08-23 PROCEDURE — 80061 LIPID PANEL: CPT | Performed by: FAMILY MEDICINE

## 2023-08-24 LAB
ALBUMIN SERPL BCG-MCNC: 4.8 G/DL (ref 3.5–5.2)
ALP SERPL-CCNC: 89 U/L (ref 40–129)
ALT SERPL W P-5'-P-CCNC: 218 U/L (ref 0–70)
ANION GAP SERPL CALCULATED.3IONS-SCNC: 18 MMOL/L (ref 7–15)
AST SERPL W P-5'-P-CCNC: 284 U/L (ref 0–45)
BILIRUB SERPL-MCNC: 0.4 MG/DL
BUN SERPL-MCNC: 6.9 MG/DL (ref 6–20)
CALCIUM SERPL-MCNC: 9.8 MG/DL (ref 8.6–10)
CHLORIDE SERPL-SCNC: 98 MMOL/L (ref 98–107)
CHOLEST SERPL-MCNC: 352 MG/DL
CREAT SERPL-MCNC: 0.78 MG/DL (ref 0.67–1.17)
DEPRECATED HCO3 PLAS-SCNC: 23 MMOL/L (ref 22–29)
GFR SERPL CREATININE-BSD FRML MDRD: >90 ML/MIN/1.73M2
GLUCOSE SERPL-MCNC: 88 MG/DL (ref 70–99)
HDLC SERPL-MCNC: 143 MG/DL
LDLC SERPL CALC-MCNC: 189 MG/DL
LIPASE SERPL-CCNC: 73 U/L (ref 13–60)
NONHDLC SERPL-MCNC: 209 MG/DL
POTASSIUM SERPL-SCNC: 3.4 MMOL/L (ref 3.4–5.3)
PROT SERPL-MCNC: 7.8 G/DL (ref 6.4–8.3)
SODIUM SERPL-SCNC: 139 MMOL/L (ref 136–145)
TRIGL SERPL-MCNC: 101 MG/DL
TSH SERPL DL<=0.005 MIU/L-ACNC: 1.42 UIU/ML (ref 0.3–4.2)

## 2024-04-10 ENCOUNTER — LAB REQUISITION (OUTPATIENT)
Dept: LAB | Facility: CLINIC | Age: 44
End: 2024-04-10

## 2024-04-10 DIAGNOSIS — R74.8 ABNORMAL LEVELS OF OTHER SERUM ENZYMES: ICD-10-CM

## 2024-04-10 LAB
BASOPHILS # BLD AUTO: 0.1 10E3/UL (ref 0–0.2)
BASOPHILS NFR BLD AUTO: 2 %
EOSINOPHIL # BLD AUTO: 0.3 10E3/UL (ref 0–0.7)
EOSINOPHIL NFR BLD AUTO: 8 %
ERYTHROCYTE [DISTWIDTH] IN BLOOD BY AUTOMATED COUNT: 13 % (ref 10–15)
HCT VFR BLD AUTO: 40.6 % (ref 40–53)
HGB BLD-MCNC: 13.9 G/DL (ref 13.3–17.7)
IMM GRANULOCYTES # BLD: 0 10E3/UL
IMM GRANULOCYTES NFR BLD: 0 %
LYMPHOCYTES # BLD AUTO: 0.9 10E3/UL (ref 0.8–5.3)
LYMPHOCYTES NFR BLD AUTO: 24 %
MCH RBC QN AUTO: 32.6 PG (ref 26.5–33)
MCHC RBC AUTO-ENTMCNC: 34.2 G/DL (ref 31.5–36.5)
MCV RBC AUTO: 95 FL (ref 78–100)
MONOCYTES # BLD AUTO: 0.5 10E3/UL (ref 0–1.3)
MONOCYTES NFR BLD AUTO: 12 %
NEUTROPHILS # BLD AUTO: 2.1 10E3/UL (ref 1.6–8.3)
NEUTROPHILS NFR BLD AUTO: 54 %
NRBC # BLD AUTO: 0 10E3/UL
NRBC BLD AUTO-RTO: 1 /100
PLATELET # BLD AUTO: 174 10E3/UL (ref 150–450)
RBC # BLD AUTO: 4.26 10E6/UL (ref 4.4–5.9)
WBC # BLD AUTO: 3.8 10E3/UL (ref 4–11)

## 2024-04-10 PROCEDURE — 80053 COMPREHEN METABOLIC PANEL: CPT | Performed by: FAMILY MEDICINE

## 2024-04-10 PROCEDURE — 85025 COMPLETE CBC W/AUTO DIFF WBC: CPT | Performed by: FAMILY MEDICINE

## 2024-04-10 PROCEDURE — 83690 ASSAY OF LIPASE: CPT | Performed by: FAMILY MEDICINE

## 2024-04-11 LAB
ALBUMIN SERPL BCG-MCNC: 4.7 G/DL (ref 3.5–5.2)
ALP SERPL-CCNC: 141 U/L (ref 40–150)
ALT SERPL W P-5'-P-CCNC: 176 U/L (ref 0–70)
ANION GAP SERPL CALCULATED.3IONS-SCNC: 14 MMOL/L (ref 7–15)
AST SERPL W P-5'-P-CCNC: 272 U/L (ref 0–45)
BILIRUB SERPL-MCNC: 0.4 MG/DL
BUN SERPL-MCNC: 9.9 MG/DL (ref 6–20)
CALCIUM SERPL-MCNC: 9.5 MG/DL (ref 8.6–10)
CHLORIDE SERPL-SCNC: 101 MMOL/L (ref 98–107)
CREAT SERPL-MCNC: 0.78 MG/DL (ref 0.67–1.17)
DEPRECATED HCO3 PLAS-SCNC: 26 MMOL/L (ref 22–29)
EGFRCR SERPLBLD CKD-EPI 2021: >90 ML/MIN/1.73M2
GLUCOSE SERPL-MCNC: 124 MG/DL (ref 70–99)
LIPASE SERPL-CCNC: 117 U/L (ref 13–60)
POTASSIUM SERPL-SCNC: 4.2 MMOL/L (ref 3.4–5.3)
PROT SERPL-MCNC: 8.2 G/DL (ref 6.4–8.3)
SODIUM SERPL-SCNC: 141 MMOL/L (ref 135–145)

## 2024-07-20 ENCOUNTER — HEALTH MAINTENANCE LETTER (OUTPATIENT)
Age: 44
End: 2024-07-20

## 2024-07-23 ENCOUNTER — HOSPITAL ENCOUNTER (INPATIENT)
Facility: HOSPITAL | Age: 44
LOS: 8 days | Discharge: HOME | End: 2024-08-01
Attending: STUDENT IN AN ORGANIZED HEALTH CARE EDUCATION/TRAINING PROGRAM | Admitting: INTERNAL MEDICINE
Payer: COMMERCIAL

## 2024-07-23 DIAGNOSIS — E78.5 HYPERLIPIDEMIA LDL GOAL <70: Primary | ICD-10-CM

## 2024-07-23 DIAGNOSIS — K85.20 ALCOHOL-INDUCED ACUTE PANCREATITIS WITHOUT INFECTION OR NECROSIS: ICD-10-CM

## 2024-07-23 DIAGNOSIS — F10.939 ALCOHOL WITHDRAWAL SYNDROME WITH COMPLICATION (H): ICD-10-CM

## 2024-07-23 DIAGNOSIS — R11.0 NAUSEA: ICD-10-CM

## 2024-07-23 DIAGNOSIS — I25.10 CORONARY ARTERY CALCIFICATION SEEN ON CT SCAN: ICD-10-CM

## 2024-07-23 PROCEDURE — HZ2ZZZZ DETOXIFICATION SERVICES FOR SUBSTANCE ABUSE TREATMENT: ICD-10-PCS | Performed by: STUDENT IN AN ORGANIZED HEALTH CARE EDUCATION/TRAINING PROGRAM

## 2024-07-23 PROCEDURE — 99285 EMERGENCY DEPT VISIT HI MDM: CPT

## 2024-07-23 RX ORDER — ONDANSETRON 2 MG/ML
4 INJECTION INTRAMUSCULAR; INTRAVENOUS EVERY 30 MIN PRN
Status: COMPLETED | OUTPATIENT
Start: 2024-07-23 | End: 2024-07-24

## 2024-07-23 ASSESSMENT — COLUMBIA-SUICIDE SEVERITY RATING SCALE - C-SSRS
1. IN THE PAST MONTH, HAVE YOU WISHED YOU WERE DEAD OR WISHED YOU COULD GO TO SLEEP AND NOT WAKE UP?: NO
6. HAVE YOU EVER DONE ANYTHING, STARTED TO DO ANYTHING, OR PREPARED TO DO ANYTHING TO END YOUR LIFE?: NO
2. HAVE YOU ACTUALLY HAD ANY THOUGHTS OF KILLING YOURSELF IN THE PAST MONTH?: NO

## 2024-07-24 ENCOUNTER — APPOINTMENT (OUTPATIENT)
Dept: RADIOLOGY | Facility: HOSPITAL | Age: 44
End: 2024-07-24
Attending: INTERNAL MEDICINE
Payer: COMMERCIAL

## 2024-07-24 ENCOUNTER — APPOINTMENT (OUTPATIENT)
Dept: CT IMAGING | Facility: HOSPITAL | Age: 44
End: 2024-07-24
Attending: INTERNAL MEDICINE
Payer: COMMERCIAL

## 2024-07-24 PROBLEM — K85.20 ALCOHOL-INDUCED ACUTE PANCREATITIS WITHOUT INFECTION OR NECROSIS: Status: ACTIVE | Noted: 2024-07-24

## 2024-07-24 PROBLEM — E83.42 HYPOMAGNESEMIA: Status: ACTIVE | Noted: 2024-07-24

## 2024-07-24 PROBLEM — K70.10 ALCOHOLIC HEPATITIS WITHOUT ASCITES (H): Status: ACTIVE | Noted: 2024-07-24

## 2024-07-24 LAB
ALBUMIN SERPL BCG-MCNC: 4.2 G/DL (ref 3.5–5.2)
ALBUMIN SERPL BCG-MCNC: 4.5 G/DL (ref 3.5–5.2)
ALP SERPL-CCNC: 118 U/L (ref 40–150)
ALP SERPL-CCNC: 138 U/L (ref 40–150)
ALT SERPL W P-5'-P-CCNC: 163 U/L (ref 0–70)
ALT SERPL W P-5'-P-CCNC: 203 U/L (ref 0–70)
ANION GAP SERPL CALCULATED.3IONS-SCNC: 18 MMOL/L (ref 7–15)
ANION GAP SERPL CALCULATED.3IONS-SCNC: 21 MMOL/L (ref 7–15)
AST SERPL W P-5'-P-CCNC: 200 U/L (ref 0–45)
AST SERPL W P-5'-P-CCNC: 274 U/L (ref 0–45)
BASOPHILS # BLD AUTO: 0 10E3/UL (ref 0–0.2)
BASOPHILS # BLD AUTO: 0.1 10E3/UL (ref 0–0.2)
BASOPHILS NFR BLD AUTO: 0 %
BASOPHILS NFR BLD AUTO: 1 %
BILIRUB DIRECT SERPL-MCNC: 0.23 MG/DL (ref 0–0.3)
BILIRUB SERPL-MCNC: 0.5 MG/DL
BILIRUB SERPL-MCNC: 0.6 MG/DL
BUN SERPL-MCNC: 7.2 MG/DL (ref 6–20)
BUN SERPL-MCNC: 8.2 MG/DL (ref 6–20)
CALCIUM SERPL-MCNC: 8.8 MG/DL (ref 8.8–10.4)
CALCIUM SERPL-MCNC: 9.3 MG/DL (ref 8.8–10.4)
CHLORIDE SERPL-SCNC: 101 MMOL/L (ref 98–107)
CHLORIDE SERPL-SCNC: 98 MMOL/L (ref 98–107)
CREAT SERPL-MCNC: 0.7 MG/DL (ref 0.67–1.17)
CREAT SERPL-MCNC: 0.76 MG/DL (ref 0.67–1.17)
EGFRCR SERPLBLD CKD-EPI 2021: >90 ML/MIN/1.73M2
EGFRCR SERPLBLD CKD-EPI 2021: >90 ML/MIN/1.73M2
EOSINOPHIL # BLD AUTO: 0 10E3/UL (ref 0–0.7)
EOSINOPHIL # BLD AUTO: 0 10E3/UL (ref 0–0.7)
EOSINOPHIL NFR BLD AUTO: 0 %
EOSINOPHIL NFR BLD AUTO: 0 %
ERYTHROCYTE [DISTWIDTH] IN BLOOD BY AUTOMATED COUNT: 12.4 % (ref 10–15)
ERYTHROCYTE [DISTWIDTH] IN BLOOD BY AUTOMATED COUNT: 12.7 % (ref 10–15)
ETHANOL SERPL-MCNC: 0.33 G/DL
GLUCOSE BLDC GLUCOMTR-MCNC: 156 MG/DL (ref 70–99)
GLUCOSE SERPL-MCNC: 147 MG/DL (ref 70–99)
GLUCOSE SERPL-MCNC: 177 MG/DL (ref 70–99)
HCO3 SERPL-SCNC: 20 MMOL/L (ref 22–29)
HCO3 SERPL-SCNC: 21 MMOL/L (ref 22–29)
HCT VFR BLD AUTO: 41.7 % (ref 40–53)
HCT VFR BLD AUTO: 45.3 % (ref 40–53)
HGB BLD-MCNC: 14.2 G/DL (ref 13.3–17.7)
HGB BLD-MCNC: 15.8 G/DL (ref 13.3–17.7)
IMM GRANULOCYTES # BLD: 0 10E3/UL
IMM GRANULOCYTES # BLD: 0 10E3/UL
IMM GRANULOCYTES NFR BLD: 0 %
IMM GRANULOCYTES NFR BLD: 0 %
LACTATE SERPL-SCNC: 4.8 MMOL/L (ref 0.7–2)
LACTATE SERPL-SCNC: 6 MMOL/L (ref 0.7–2)
LIPASE SERPL-CCNC: 1207 U/L (ref 13–60)
LIPASE SERPL-CCNC: 1425 U/L (ref 13–60)
LYMPHOCYTES # BLD AUTO: 0.3 10E3/UL (ref 0.8–5.3)
LYMPHOCYTES # BLD AUTO: 1.1 10E3/UL (ref 0.8–5.3)
LYMPHOCYTES NFR BLD AUTO: 14 %
LYMPHOCYTES NFR BLD AUTO: 3 %
MAGNESIUM SERPL-MCNC: 1.4 MG/DL (ref 1.7–2.3)
MAGNESIUM SERPL-MCNC: 1.6 MG/DL (ref 1.7–2.3)
MAGNESIUM SERPL-MCNC: 1.6 MG/DL (ref 1.7–2.3)
MCH RBC QN AUTO: 32.1 PG (ref 26.5–33)
MCH RBC QN AUTO: 32.3 PG (ref 26.5–33)
MCHC RBC AUTO-ENTMCNC: 34.1 G/DL (ref 31.5–36.5)
MCHC RBC AUTO-ENTMCNC: 34.9 G/DL (ref 31.5–36.5)
MCV RBC AUTO: 93 FL (ref 78–100)
MCV RBC AUTO: 94 FL (ref 78–100)
MONOCYTES # BLD AUTO: 0.5 10E3/UL (ref 0–1.3)
MONOCYTES # BLD AUTO: 1 10E3/UL (ref 0–1.3)
MONOCYTES NFR BLD AUTO: 6 %
MONOCYTES NFR BLD AUTO: 9 %
NEUTROPHILS # BLD AUTO: 10.1 10E3/UL (ref 1.6–8.3)
NEUTROPHILS # BLD AUTO: 6.2 10E3/UL (ref 1.6–8.3)
NEUTROPHILS NFR BLD AUTO: 79 %
NEUTROPHILS NFR BLD AUTO: 88 %
NRBC # BLD AUTO: 0 10E3/UL
NRBC # BLD AUTO: 0 10E3/UL
NRBC BLD AUTO-RTO: 0 /100
NRBC BLD AUTO-RTO: 0 /100
PHOSPHATE SERPL-MCNC: 3.3 MG/DL (ref 2.5–4.5)
PLATELET # BLD AUTO: 141 10E3/UL (ref 150–450)
PLATELET # BLD AUTO: 172 10E3/UL (ref 150–450)
POTASSIUM SERPL-SCNC: 3.2 MMOL/L (ref 3.4–5.3)
POTASSIUM SERPL-SCNC: 3.2 MMOL/L (ref 3.4–5.3)
POTASSIUM SERPL-SCNC: 3.8 MMOL/L (ref 3.4–5.3)
PROT SERPL-MCNC: 7.6 G/DL (ref 6.4–8.3)
PROT SERPL-MCNC: 8.4 G/DL (ref 6.4–8.3)
RBC # BLD AUTO: 4.42 10E6/UL (ref 4.4–5.9)
RBC # BLD AUTO: 4.89 10E6/UL (ref 4.4–5.9)
SODIUM SERPL-SCNC: 139 MMOL/L (ref 135–145)
SODIUM SERPL-SCNC: 140 MMOL/L (ref 135–145)
TRIGL SERPL-MCNC: 146 MG/DL
WBC # BLD AUTO: 11.5 10E3/UL (ref 4–11)
WBC # BLD AUTO: 7.9 10E3/UL (ref 4–11)

## 2024-07-24 PROCEDURE — 96375 TX/PRO/DX INJ NEW DRUG ADDON: CPT

## 2024-07-24 PROCEDURE — 36415 COLL VENOUS BLD VENIPUNCTURE: CPT | Performed by: STUDENT IN AN ORGANIZED HEALTH CARE EDUCATION/TRAINING PROGRAM

## 2024-07-24 PROCEDURE — 96374 THER/PROPH/DIAG INJ IV PUSH: CPT

## 2024-07-24 PROCEDURE — 258N000003 HC RX IP 258 OP 636: Performed by: STUDENT IN AN ORGANIZED HEALTH CARE EDUCATION/TRAINING PROGRAM

## 2024-07-24 PROCEDURE — 0DH67UZ INSERTION OF FEEDING DEVICE INTO STOMACH, VIA NATURAL OR ARTIFICIAL OPENING: ICD-10-PCS | Performed by: RADIOLOGY

## 2024-07-24 PROCEDURE — 36415 COLL VENOUS BLD VENIPUNCTURE: CPT | Performed by: INTERNAL MEDICINE

## 2024-07-24 PROCEDURE — 85025 COMPLETE CBC W/AUTO DIFF WBC: CPT | Performed by: INTERNAL MEDICINE

## 2024-07-24 PROCEDURE — 83735 ASSAY OF MAGNESIUM: CPT | Performed by: STUDENT IN AN ORGANIZED HEALTH CARE EDUCATION/TRAINING PROGRAM

## 2024-07-24 PROCEDURE — 83605 ASSAY OF LACTIC ACID: CPT | Performed by: INTERNAL MEDICINE

## 2024-07-24 PROCEDURE — 99207 PR APP CREDIT; MD BILLING SHARED VISIT: CPT | Performed by: STUDENT IN AN ORGANIZED HEALTH CARE EDUCATION/TRAINING PROGRAM

## 2024-07-24 PROCEDURE — 83735 ASSAY OF MAGNESIUM: CPT | Performed by: INTERNAL MEDICINE

## 2024-07-24 PROCEDURE — 83690 ASSAY OF LIPASE: CPT | Performed by: INTERNAL MEDICINE

## 2024-07-24 PROCEDURE — 84132 ASSAY OF SERUM POTASSIUM: CPT | Performed by: STUDENT IN AN ORGANIZED HEALTH CARE EDUCATION/TRAINING PROGRAM

## 2024-07-24 PROCEDURE — 74177 CT ABD & PELVIS W/CONTRAST: CPT

## 2024-07-24 PROCEDURE — 250N000011 HC RX IP 250 OP 636: Performed by: STUDENT IN AN ORGANIZED HEALTH CARE EDUCATION/TRAINING PROGRAM

## 2024-07-24 PROCEDURE — 96361 HYDRATE IV INFUSION ADD-ON: CPT

## 2024-07-24 PROCEDURE — 80048 BASIC METABOLIC PNL TOTAL CA: CPT | Performed by: INTERNAL MEDICINE

## 2024-07-24 PROCEDURE — 83690 ASSAY OF LIPASE: CPT | Performed by: STUDENT IN AN ORGANIZED HEALTH CARE EDUCATION/TRAINING PROGRAM

## 2024-07-24 PROCEDURE — 80053 COMPREHEN METABOLIC PANEL: CPT | Performed by: STUDENT IN AN ORGANIZED HEALTH CARE EDUCATION/TRAINING PROGRAM

## 2024-07-24 PROCEDURE — 43752 NASAL/OROGASTRIC W/TUBE PLMT: CPT

## 2024-07-24 PROCEDURE — 82962 GLUCOSE BLOOD TEST: CPT

## 2024-07-24 PROCEDURE — 200N000001 HC R&B ICU

## 2024-07-24 PROCEDURE — 82077 ASSAY SPEC XCP UR&BREATH IA: CPT | Performed by: STUDENT IN AN ORGANIZED HEALTH CARE EDUCATION/TRAINING PROGRAM

## 2024-07-24 PROCEDURE — 99223 1ST HOSP IP/OBS HIGH 75: CPT | Performed by: INTERNAL MEDICINE

## 2024-07-24 PROCEDURE — 250N000011 HC RX IP 250 OP 636: Performed by: INTERNAL MEDICINE

## 2024-07-24 PROCEDURE — 250N000013 HC RX MED GY IP 250 OP 250 PS 637: Performed by: STUDENT IN AN ORGANIZED HEALTH CARE EDUCATION/TRAINING PROGRAM

## 2024-07-24 PROCEDURE — 96376 TX/PRO/DX INJ SAME DRUG ADON: CPT

## 2024-07-24 PROCEDURE — 85025 COMPLETE CBC W/AUTO DIFF WBC: CPT | Performed by: STUDENT IN AN ORGANIZED HEALTH CARE EDUCATION/TRAINING PROGRAM

## 2024-07-24 PROCEDURE — 84478 ASSAY OF TRIGLYCERIDES: CPT | Performed by: STUDENT IN AN ORGANIZED HEALTH CARE EDUCATION/TRAINING PROGRAM

## 2024-07-24 PROCEDURE — 83605 ASSAY OF LACTIC ACID: CPT | Performed by: STUDENT IN AN ORGANIZED HEALTH CARE EDUCATION/TRAINING PROGRAM

## 2024-07-24 PROCEDURE — 84100 ASSAY OF PHOSPHORUS: CPT | Performed by: INTERNAL MEDICINE

## 2024-07-24 PROCEDURE — 258N000003 HC RX IP 258 OP 636: Performed by: INTERNAL MEDICINE

## 2024-07-24 RX ORDER — HYDROMORPHONE HCL IN WATER/PF 6 MG/30 ML
0.4 PATIENT CONTROLLED ANALGESIA SYRINGE INTRAVENOUS
Status: DISCONTINUED | OUTPATIENT
Start: 2024-07-24 | End: 2024-07-24

## 2024-07-24 RX ORDER — AMOXICILLIN 250 MG
1 CAPSULE ORAL 2 TIMES DAILY PRN
Status: DISCONTINUED | OUTPATIENT
Start: 2024-07-24 | End: 2024-08-01 | Stop reason: HOSPADM

## 2024-07-24 RX ORDER — CALCIUM CARBONATE 500 MG/1
1000 TABLET, CHEWABLE ORAL 4 TIMES DAILY PRN
Status: DISCONTINUED | OUTPATIENT
Start: 2024-07-24 | End: 2024-07-24

## 2024-07-24 RX ORDER — CLONIDINE HYDROCHLORIDE 0.2 MG/1
0.2 TABLET ORAL EVERY 8 HOURS
Status: DISCONTINUED | OUTPATIENT
Start: 2024-07-24 | End: 2024-07-25

## 2024-07-24 RX ORDER — TRAZODONE HYDROCHLORIDE 50 MG/1
50 TABLET, FILM COATED ORAL
COMMUNITY

## 2024-07-24 RX ORDER — CALCIUM CARBONATE 500 MG/1
1000 TABLET, CHEWABLE ORAL 4 TIMES DAILY PRN
Status: DISCONTINUED | OUTPATIENT
Start: 2024-07-24 | End: 2024-08-01 | Stop reason: HOSPADM

## 2024-07-24 RX ORDER — LISINOPRIL/HYDROCHLOROTHIAZIDE 10-12.5 MG
1 TABLET ORAL DAILY
Status: DISCONTINUED | OUTPATIENT
Start: 2024-07-25 | End: 2024-07-27

## 2024-07-24 RX ORDER — AMOXICILLIN 250 MG
2 CAPSULE ORAL 2 TIMES DAILY PRN
Status: DISCONTINUED | OUTPATIENT
Start: 2024-07-24 | End: 2024-08-01 | Stop reason: HOSPADM

## 2024-07-24 RX ORDER — LIDOCAINE 40 MG/G
CREAM TOPICAL
Status: DISCONTINUED | OUTPATIENT
Start: 2024-07-24 | End: 2024-08-01 | Stop reason: HOSPADM

## 2024-07-24 RX ORDER — MULTIPLE VITAMINS W/ MINERALS TAB 9MG-400MCG
1 TAB ORAL DAILY
Status: DISCONTINUED | OUTPATIENT
Start: 2024-07-25 | End: 2024-08-01 | Stop reason: HOSPADM

## 2024-07-24 RX ORDER — NICOTINE POLACRILEX 4 MG
15-30 LOZENGE BUCCAL
Status: DISCONTINUED | OUTPATIENT
Start: 2024-07-24 | End: 2024-08-01 | Stop reason: HOSPADM

## 2024-07-24 RX ORDER — LISINOPRIL/HYDROCHLOROTHIAZIDE 10-12.5 MG
1 TABLET ORAL DAILY
Status: DISCONTINUED | OUTPATIENT
Start: 2024-07-24 | End: 2024-07-24

## 2024-07-24 RX ORDER — SODIUM CHLORIDE, SODIUM LACTATE, POTASSIUM CHLORIDE, CALCIUM CHLORIDE 600; 310; 30; 20 MG/100ML; MG/100ML; MG/100ML; MG/100ML
INJECTION, SOLUTION INTRAVENOUS CONTINUOUS
Status: DISCONTINUED | OUTPATIENT
Start: 2024-07-24 | End: 2024-07-24

## 2024-07-24 RX ORDER — DEXTROSE, SODIUM CHLORIDE, SODIUM LACTATE, POTASSIUM CHLORIDE, AND CALCIUM CHLORIDE 5; .6; .31; .03; .02 G/100ML; G/100ML; G/100ML; G/100ML; G/100ML
INJECTION, SOLUTION INTRAVENOUS CONTINUOUS
Status: DISCONTINUED | OUTPATIENT
Start: 2024-07-24 | End: 2024-07-25

## 2024-07-24 RX ORDER — NALOXONE HYDROCHLORIDE 0.4 MG/ML
0.4 INJECTION, SOLUTION INTRAMUSCULAR; INTRAVENOUS; SUBCUTANEOUS
Status: DISCONTINUED | OUTPATIENT
Start: 2024-07-24 | End: 2024-08-01 | Stop reason: HOSPADM

## 2024-07-24 RX ORDER — LORAZEPAM 2 MG/ML
1-2 INJECTION INTRAMUSCULAR EVERY 30 MIN PRN
Status: DISCONTINUED | OUTPATIENT
Start: 2024-07-24 | End: 2024-07-28

## 2024-07-24 RX ORDER — ACETAMINOPHEN 500 MG
500-1000 TABLET ORAL EVERY 6 HOURS PRN
COMMUNITY

## 2024-07-24 RX ORDER — DEXTROSE MONOHYDRATE 100 MG/ML
INJECTION, SOLUTION INTRAVENOUS CONTINUOUS PRN
Status: DISCONTINUED | OUTPATIENT
Start: 2024-07-24 | End: 2024-08-01 | Stop reason: HOSPADM

## 2024-07-24 RX ORDER — PROCHLORPERAZINE MALEATE 5 MG
5 TABLET ORAL EVERY 6 HOURS PRN
Status: DISCONTINUED | OUTPATIENT
Start: 2024-07-24 | End: 2024-07-24

## 2024-07-24 RX ORDER — MAGNESIUM SULFATE HEPTAHYDRATE 40 MG/ML
2 INJECTION, SOLUTION INTRAVENOUS ONCE
Status: COMPLETED | OUTPATIENT
Start: 2024-07-24 | End: 2024-07-24

## 2024-07-24 RX ORDER — LABETALOL HYDROCHLORIDE 5 MG/ML
INJECTION, SOLUTION INTRAVENOUS
Status: COMPLETED
Start: 2024-07-24 | End: 2024-07-24

## 2024-07-24 RX ORDER — HYDROMORPHONE HYDROCHLORIDE 1 MG/ML
0.5 INJECTION, SOLUTION INTRAMUSCULAR; INTRAVENOUS; SUBCUTANEOUS
Status: DISCONTINUED | OUTPATIENT
Start: 2024-07-24 | End: 2024-07-24

## 2024-07-24 RX ORDER — HALOPERIDOL 5 MG/ML
2.5-5 INJECTION INTRAMUSCULAR EVERY 6 HOURS PRN
Status: DISCONTINUED | OUTPATIENT
Start: 2024-07-24 | End: 2024-07-29

## 2024-07-24 RX ORDER — DEXTROSE MONOHYDRATE 25 G/50ML
25-50 INJECTION, SOLUTION INTRAVENOUS
Status: DISCONTINUED | OUTPATIENT
Start: 2024-07-24 | End: 2024-08-01 | Stop reason: HOSPADM

## 2024-07-24 RX ORDER — HYDROMORPHONE HCL IN WATER/PF 6 MG/30 ML
0.2 PATIENT CONTROLLED ANALGESIA SYRINGE INTRAVENOUS
Status: DISCONTINUED | OUTPATIENT
Start: 2024-07-24 | End: 2024-07-24

## 2024-07-24 RX ORDER — LABETALOL HYDROCHLORIDE 5 MG/ML
20 INJECTION, SOLUTION INTRAVENOUS EVERY 6 HOURS PRN
Status: DISCONTINUED | OUTPATIENT
Start: 2024-07-24 | End: 2024-07-24

## 2024-07-24 RX ORDER — PROCHLORPERAZINE 25 MG
25 SUPPOSITORY, RECTAL RECTAL EVERY 12 HOURS PRN
Status: DISCONTINUED | OUTPATIENT
Start: 2024-07-24 | End: 2024-07-24

## 2024-07-24 RX ORDER — HYDROMORPHONE HCL IN WATER/PF 6 MG/30 ML
0.5 PATIENT CONTROLLED ANALGESIA SYRINGE INTRAVENOUS
Status: COMPLETED | OUTPATIENT
Start: 2024-07-24 | End: 2024-07-24

## 2024-07-24 RX ORDER — FOLIC ACID 1 MG/1
1 TABLET ORAL DAILY
Status: DISCONTINUED | OUTPATIENT
Start: 2024-07-25 | End: 2024-07-24

## 2024-07-24 RX ORDER — NALOXONE HYDROCHLORIDE 0.4 MG/ML
0.2 INJECTION, SOLUTION INTRAMUSCULAR; INTRAVENOUS; SUBCUTANEOUS
Status: DISCONTINUED | OUTPATIENT
Start: 2024-07-24 | End: 2024-08-01 | Stop reason: HOSPADM

## 2024-07-24 RX ORDER — HYDRALAZINE HYDROCHLORIDE 20 MG/ML
10 INJECTION INTRAMUSCULAR; INTRAVENOUS EVERY 6 HOURS PRN
Status: DISCONTINUED | OUTPATIENT
Start: 2024-07-24 | End: 2024-07-27

## 2024-07-24 RX ORDER — MAGNESIUM SULFATE 4 G/50ML
4 INJECTION INTRAVENOUS ONCE
Status: COMPLETED | OUTPATIENT
Start: 2024-07-24 | End: 2024-07-24

## 2024-07-24 RX ORDER — HYDROMORPHONE HCL IN WATER/PF 6 MG/30 ML
0.4 PATIENT CONTROLLED ANALGESIA SYRINGE INTRAVENOUS
Status: DISCONTINUED | OUTPATIENT
Start: 2024-07-24 | End: 2024-07-26

## 2024-07-24 RX ORDER — LORAZEPAM 0.5 MG/1
1-2 TABLET ORAL EVERY 30 MIN PRN
Status: DISCONTINUED | OUTPATIENT
Start: 2024-07-24 | End: 2024-07-28

## 2024-07-24 RX ORDER — IOPAMIDOL 755 MG/ML
90 INJECTION, SOLUTION INTRAVASCULAR ONCE
Status: COMPLETED | OUTPATIENT
Start: 2024-07-24 | End: 2024-07-24

## 2024-07-24 RX ORDER — ONDANSETRON 4 MG/1
4 TABLET, ORALLY DISINTEGRATING ORAL EVERY 6 HOURS PRN
Status: DISCONTINUED | OUTPATIENT
Start: 2024-07-24 | End: 2024-08-01 | Stop reason: HOSPADM

## 2024-07-24 RX ORDER — CLONIDINE HYDROCHLORIDE 0.2 MG/1
0.2 TABLET ORAL EVERY 8 HOURS
Status: DISCONTINUED | OUTPATIENT
Start: 2024-07-24 | End: 2024-07-24

## 2024-07-24 RX ORDER — HYDROMORPHONE HCL IN WATER/PF 6 MG/30 ML
0.2 PATIENT CONTROLLED ANALGESIA SYRINGE INTRAVENOUS
Status: DISCONTINUED | OUTPATIENT
Start: 2024-07-24 | End: 2024-07-26

## 2024-07-24 RX ORDER — ONDANSETRON 2 MG/ML
4 INJECTION INTRAMUSCULAR; INTRAVENOUS EVERY 6 HOURS PRN
Status: DISCONTINUED | OUTPATIENT
Start: 2024-07-24 | End: 2024-08-01 | Stop reason: HOSPADM

## 2024-07-24 RX ORDER — OLANZAPINE 5 MG/1
5-10 TABLET, ORALLY DISINTEGRATING ORAL EVERY 6 HOURS PRN
Status: DISCONTINUED | OUTPATIENT
Start: 2024-07-24 | End: 2024-07-29

## 2024-07-24 RX ORDER — LABETALOL HYDROCHLORIDE 5 MG/ML
10 INJECTION, SOLUTION INTRAVENOUS EVERY 6 HOURS PRN
Status: DISCONTINUED | OUTPATIENT
Start: 2024-07-24 | End: 2024-07-24

## 2024-07-24 RX ORDER — FLUMAZENIL 0.1 MG/ML
0.2 INJECTION, SOLUTION INTRAVENOUS
Status: DISCONTINUED | OUTPATIENT
Start: 2024-07-24 | End: 2024-07-27

## 2024-07-24 RX ORDER — POTASSIUM CHLORIDE 7.45 MG/ML
10 INJECTION INTRAVENOUS
Status: COMPLETED | OUTPATIENT
Start: 2024-07-24 | End: 2024-07-24

## 2024-07-24 RX ORDER — CLONIDINE HYDROCHLORIDE 0.1 MG/1
0.1 TABLET ORAL EVERY 8 HOURS
Status: DISCONTINUED | OUTPATIENT
Start: 2024-07-24 | End: 2024-07-24

## 2024-07-24 RX ORDER — FOLIC ACID 1 MG/1
1 TABLET ORAL DAILY
Status: DISCONTINUED | OUTPATIENT
Start: 2024-07-25 | End: 2024-07-27

## 2024-07-24 RX ADMIN — FAMOTIDINE 20 MG: 10 INJECTION, SOLUTION INTRAVENOUS at 05:00

## 2024-07-24 RX ADMIN — HYDROMORPHONE HYDROCHLORIDE 0.5 MG: 0.2 INJECTION, SOLUTION INTRAMUSCULAR; INTRAVENOUS; SUBCUTANEOUS at 01:17

## 2024-07-24 RX ADMIN — ONDANSETRON 4 MG: 2 INJECTION INTRAMUSCULAR; INTRAVENOUS at 08:47

## 2024-07-24 RX ADMIN — MAGNESIUM SULFATE HEPTAHYDRATE 2 G: 40 INJECTION, SOLUTION INTRAVENOUS at 20:10

## 2024-07-24 RX ADMIN — HYDROMORPHONE HYDROCHLORIDE 0.4 MG: 0.2 INJECTION, SOLUTION INTRAMUSCULAR; INTRAVENOUS; SUBCUTANEOUS at 04:37

## 2024-07-24 RX ADMIN — IOPAMIDOL 90 ML: 755 INJECTION, SOLUTION INTRAVENOUS at 09:45

## 2024-07-24 RX ADMIN — SODIUM CHLORIDE, SODIUM LACTATE, POTASSIUM CHLORIDE, CALCIUM CHLORIDE AND DEXTROSE MONOHYDRATE: 5; 600; 310; 30; 20 INJECTION, SOLUTION INTRAVENOUS at 06:00

## 2024-07-24 RX ADMIN — HYDROMORPHONE HYDROCHLORIDE 0.5 MG: 0.2 INJECTION, SOLUTION INTRAMUSCULAR; INTRAVENOUS; SUBCUTANEOUS at 02:21

## 2024-07-24 RX ADMIN — HYDROMORPHONE HYDROCHLORIDE 0.4 MG: 0.2 INJECTION, SOLUTION INTRAMUSCULAR; INTRAVENOUS; SUBCUTANEOUS at 10:46

## 2024-07-24 RX ADMIN — POTASSIUM CHLORIDE 10 MEQ: 10 INJECTION, SOLUTION INTRAVENOUS at 11:57

## 2024-07-24 RX ADMIN — ONDANSETRON 4 MG: 2 INJECTION INTRAMUSCULAR; INTRAVENOUS at 02:21

## 2024-07-24 RX ADMIN — HYDROMORPHONE HYDROCHLORIDE 1 MG: 1 INJECTION, SOLUTION INTRAMUSCULAR; INTRAVENOUS; SUBCUTANEOUS at 07:23

## 2024-07-24 RX ADMIN — POTASSIUM CHLORIDE 10 MEQ: 7.46 INJECTION, SOLUTION INTRAVENOUS at 22:21

## 2024-07-24 RX ADMIN — ONDANSETRON 4 MG: 2 INJECTION INTRAMUSCULAR; INTRAVENOUS at 15:59

## 2024-07-24 RX ADMIN — HYDROMORPHONE HYDROCHLORIDE 0.2 MG: 0.2 INJECTION, SOLUTION INTRAMUSCULAR; INTRAVENOUS; SUBCUTANEOUS at 20:10

## 2024-07-24 RX ADMIN — NICARDIPINE HYDROCHLORIDE 2.5 MG/HR: 0.2 INJECTION, SOLUTION INTRAVENOUS at 14:19

## 2024-07-24 RX ADMIN — ONDANSETRON 4 MG: 2 INJECTION INTRAMUSCULAR; INTRAVENOUS at 01:17

## 2024-07-24 RX ADMIN — LORAZEPAM 2 MG: 2 INJECTION INTRAMUSCULAR; INTRAVENOUS at 16:12

## 2024-07-24 RX ADMIN — HYDROMORPHONE HYDROCHLORIDE 0.2 MG: 0.2 INJECTION, SOLUTION INTRAMUSCULAR; INTRAVENOUS; SUBCUTANEOUS at 23:29

## 2024-07-24 RX ADMIN — PROCHLORPERAZINE EDISYLATE 5 MG: 5 INJECTION INTRAMUSCULAR; INTRAVENOUS at 22:24

## 2024-07-24 RX ADMIN — SODIUM CHLORIDE 1000 ML: 9 INJECTION, SOLUTION INTRAVENOUS at 00:10

## 2024-07-24 RX ADMIN — LORAZEPAM 2 MG: 2 INJECTION INTRAMUSCULAR; INTRAVENOUS at 11:46

## 2024-07-24 RX ADMIN — POTASSIUM CHLORIDE 10 MEQ: 7.46 INJECTION, SOLUTION INTRAVENOUS at 21:05

## 2024-07-24 RX ADMIN — HYDROMORPHONE HYDROCHLORIDE 0.4 MG: 0.2 INJECTION, SOLUTION INTRAMUSCULAR; INTRAVENOUS; SUBCUTANEOUS at 08:48

## 2024-07-24 RX ADMIN — MAGNESIUM SULFATE HEPTAHYDRATE 4 G: 80 INJECTION, SOLUTION INTRAVENOUS at 06:30

## 2024-07-24 RX ADMIN — CLONIDINE HYDROCHLORIDE 0.2 MG: 0.1 TABLET ORAL at 10:46

## 2024-07-24 RX ADMIN — HYDRALAZINE HYDROCHLORIDE 10 MG: 20 INJECTION INTRAMUSCULAR; INTRAVENOUS at 10:45

## 2024-07-24 RX ADMIN — HYDROMORPHONE HYDROCHLORIDE 0.5 MG: 0.2 INJECTION, SOLUTION INTRAMUSCULAR; INTRAVENOUS; SUBCUTANEOUS at 00:21

## 2024-07-24 RX ADMIN — SODIUM CHLORIDE, POTASSIUM CHLORIDE, SODIUM LACTATE AND CALCIUM CHLORIDE 1000 ML: 600; 310; 30; 20 INJECTION, SOLUTION INTRAVENOUS at 10:59

## 2024-07-24 RX ADMIN — LABETALOL HYDROCHLORIDE 20 MG: 5 INJECTION, SOLUTION INTRAVENOUS at 11:29

## 2024-07-24 RX ADMIN — LORAZEPAM 2 MG: 2 INJECTION INTRAMUSCULAR; INTRAVENOUS at 16:55

## 2024-07-24 RX ADMIN — ONDANSETRON 4 MG: 2 INJECTION INTRAMUSCULAR; INTRAVENOUS at 00:11

## 2024-07-24 RX ADMIN — PROCHLORPERAZINE EDISYLATE 5 MG: 5 INJECTION INTRAMUSCULAR; INTRAVENOUS at 16:58

## 2024-07-24 RX ADMIN — POTASSIUM CHLORIDE 10 MEQ: 10 INJECTION, SOLUTION INTRAVENOUS at 06:12

## 2024-07-24 RX ADMIN — LISINOPRIL AND HYDROCHLOROTHIAZIDE 1 TABLET: 12.5; 1 TABLET ORAL at 11:06

## 2024-07-24 RX ADMIN — PANTOPRAZOLE SODIUM 40 MG: 40 INJECTION, POWDER, FOR SOLUTION INTRAVENOUS at 08:58

## 2024-07-24 RX ADMIN — SODIUM CHLORIDE, POTASSIUM CHLORIDE, SODIUM LACTATE AND CALCIUM CHLORIDE 500 ML: 600; 310; 30; 20 INJECTION, SOLUTION INTRAVENOUS at 09:01

## 2024-07-24 RX ADMIN — LORAZEPAM 2 MG: 2 INJECTION INTRAMUSCULAR; INTRAVENOUS at 08:33

## 2024-07-24 RX ADMIN — LABETALOL HYDROCHLORIDE 10 MG: 5 INJECTION, SOLUTION INTRAVENOUS at 09:13

## 2024-07-24 RX ADMIN — PANTOPRAZOLE SODIUM 40 MG: 40 INJECTION, POWDER, FOR SOLUTION INTRAVENOUS at 20:10

## 2024-07-24 RX ADMIN — SODIUM CHLORIDE, POTASSIUM CHLORIDE, SODIUM LACTATE AND CALCIUM CHLORIDE 1000 ML: 600; 310; 30; 20 INJECTION, SOLUTION INTRAVENOUS at 07:27

## 2024-07-24 RX ADMIN — POTASSIUM CHLORIDE 10 MEQ: 10 INJECTION, SOLUTION INTRAVENOUS at 11:05

## 2024-07-24 RX ADMIN — POTASSIUM CHLORIDE 10 MEQ: 10 INJECTION, SOLUTION INTRAVENOUS at 06:39

## 2024-07-24 RX ADMIN — PROCHLORPERAZINE EDISYLATE 5 MG: 5 INJECTION INTRAMUSCULAR; INTRAVENOUS at 04:59

## 2024-07-24 RX ADMIN — LORAZEPAM 2 MG: 2 INJECTION INTRAMUSCULAR; INTRAVENOUS at 04:42

## 2024-07-24 RX ADMIN — LORAZEPAM 2 MG: 2 INJECTION INTRAMUSCULAR; INTRAVENOUS at 23:31

## 2024-07-24 RX ADMIN — CLONIDINE HYDROCHLORIDE 0.2 MG: 0.2 TABLET ORAL at 21:06

## 2024-07-24 RX ADMIN — SODIUM CHLORIDE, POTASSIUM CHLORIDE, SODIUM LACTATE AND CALCIUM CHLORIDE: 600; 310; 30; 20 INJECTION, SOLUTION INTRAVENOUS at 05:08

## 2024-07-24 ASSESSMENT — ACTIVITIES OF DAILY LIVING (ADL)
ADLS_ACUITY_SCORE: 39
DEPENDENT_IADLS:: INDEPENDENT
ADLS_ACUITY_SCORE: 39
ADLS_ACUITY_SCORE: 39
ADLS_ACUITY_SCORE: 35
ADLS_ACUITY_SCORE: 35
ADLS_ACUITY_SCORE: 39
ADLS_ACUITY_SCORE: 35
ADLS_ACUITY_SCORE: 39
ADLS_ACUITY_SCORE: 40
ADLS_ACUITY_SCORE: 39
ADLS_ACUITY_SCORE: 39
ADLS_ACUITY_SCORE: 35
ADLS_ACUITY_SCORE: 39
ADLS_ACUITY_SCORE: 40
ADLS_ACUITY_SCORE: 39
ADLS_ACUITY_SCORE: 35
ADLS_ACUITY_SCORE: 39
ADLS_ACUITY_SCORE: 39
ADLS_ACUITY_SCORE: 35
ADLS_ACUITY_SCORE: 35

## 2024-07-24 NOTE — H&P
[facility]    History and Physical - Hospitalist Service       Date of Admission:  7/23/2024    Assessment & Plan      The patient is a 44-year-old male with a significant medical history of hypertension hyperlipidemia, gastroesophageal reflux disease, EtOH abuse and alcohol withdrawal syndrome who is admitted  due to alcohol related acute pancreatitis.    Patient Active Problem List   Diagnosis    Cervical radiculopathy    Accelerated hypertension    Hypokalemia    Hyponatremia    Acute kidney injury (H24)    Alcohol withdrawal syndrome with complication (H)    Alcohol-induced acute pancreatitis without infection or necrosis      Acute pancreatitis alcohol induced.  Supportive cares for now.  Antiemetics, Dilaudid for pain control.  May need GI consultation.  N.p.o. with ice chips for now.  Encourage daily enteric feeding as tolerable.  CT abdomen and pelvis ordered which is pending.    EtOH abuse-blood alcohol level was 0.33.  Patient placed on CIWA protocol, thiamine, folate, MVI ordered.  Pepcid IV    Alcoholic hepatitis-appreciate GI input.  Continue to monitor for now.    Abdominal pain-could have ongoing gastritis and also due to pancreatitis.  Pain control for now.  Consider GI input if not clinically improving.    EtOH abuse-patient reports drinking a lot more these days.  He is interested in going to rehab    Hypokalemia-check magnesium and replete    Rest of patient's medical problems management remains unchanged       Diet:  N.p.o. except ice chips  DVT Prophylaxis: Pneumatic Compression Devices  Mancia Catheter: Not present  Lines: None     Cardiac Monitoring: yes  Code Status:  Full code    Clinically Significant Risk Factors Present on Admission        # Hypokalemia: Lowest K = 3.2 mmol/L in last 2 days, will replace as needed      # Anion Gap Metabolic Acidosis: Highest Anion Gap = 21 mmol/L in last 2 days, will monitor and treat as appropriate      # Hypertension: Noted on  problem list   # Acute Respiratory Failure: Documented O2 saturation < 91%.  Continue supplemental oxygen as needed            # Overweight: Estimated body mass index is 25.23 kg/m  as calculated from the following:    Height as of this encounter: 1.829 m (6').    Weight as of this encounter: 84.4 kg (186 lb).              Disposition Plan     Medically Ready for Discharge: Anticipated in 2-4 Days           Madyson Graff MD  Hospitalist Service  [facility]  Securely message with [employer] (more info)  Text page via [facility] Paging/Directory     ______________________________________________________________________    Chief Complaint   Abdominal pain      History of Present Illness     The patient is a 44-year-old male with a significant medical history of hypertension (hyperlipidemia (HLD), gastroesophageal reflux disease  and alcohol withdrawal syndrome who is admitted  due to alcohol related acute pancreatitis     The patient reports localized left-sided abdominal pain, nausea, and vomiting for the past couple of days, with approximately 16 non-bloody vomiting episodes throughout the day. His girlfriend reports that he consumes about 4-5 alcoholic beverages per day. The patient denies ever having alcohol withdrawal seizures.     He  denies having diarrhea, abnormal stools, or any other medical complaints at this time. Anemia workup was ordered, and follow-up lab tests, including serial hemoglobins, are planned.  Preliminary labs showed a BMP with a potassium of 3.2, carbon of 21,, BUN of 7.2, creatinine 0.76.  CBC showed a white count of 7.9, hemoglobin 15, platelet 172.  Lipase 10 was 12,207.    Blood alcohol was 0.33. ER intervention included starting IV fluids, pain control  And given the medications below.  Patient reports he has been drinking a lot more lately.  He is a  and has 2 jobs     He works with part-time for the [employer] and works full-time for the .  He reports it has  been a little stressful and so to function he drinks alcohol every night.  He is drinking about 1 to 2 glasses of vodka every night.  He also reports he is not eating too well.      MEDICATIONS GIVEN IN THE EMERGENCY:  Medications   sodium chloride 0.9% BOLUS 1,000 mL (0 mLs Intravenous Stopped 7/24/24 0218)   ondansetron (ZOFRAN) injection 4 mg (4 mg Intravenous $Given 7/24/24 0221)   HYDROmorphone (DILAUDID) injection 0.5 mg (0.5 mg Intravenous $Given 7/24/24 0221)       Patient was given thiamine, folate and MVI as well.  He has been admitted for further inpatient cares.    CT abdomen done in the ER is pending      Patient is being admitted for further inpatient care as      Past Medical History    Past Medical History:   Diagnosis Date    Accelerated hypertension     Acute kidney injury (H) 2/23/2022    Alcohol withdrawal syndrome with complication (H) 2/23/2022    Cervical radiculopathy 4/19/2016    Hypokalemia     Hyponatremia 2/23/2022       Past Surgical History   Past Surgical History:   Procedure Laterality Date    BACK SURGERY      IR CERVICAL EPIDURAL STEROID INJECTION  4/21/2016       Prior to Admission Medications   Prior to Admission Medications   Prescriptions Last Dose Informant Patient Reported? Taking?   LORazepam (ATIVAN) 1 MG tablet   No No   Sig: Take 1 tablet (1 mg) by mouth every 6 hours as needed for withdrawal   atorvastatin (LIPITOR) 40 MG tablet   No No   Sig: Take 1 tablet (40 mg) by mouth daily for 30 days   lisinopril-hydrochlorothiazide (ZESTORETIC) 10-12.5 MG tablet   No No   Sig: Take 1 tablet by mouth daily   ondansetron (ZOFRAN) 4 MG tablet   Yes No   Sig: Take 4 mg by mouth every 6 hours as needed for nausea      Facility-Administered Medications: None        Review of Systems    The 10 point Review of Systems is negative other than noted in the HPI or here.     Social History   I have reviewed this patient's social history and updated it with pertinent information if  needed.  Social History     Tobacco Use    Smoking status: Never   Substance Use Topics    Alcohol use: Yes     Alcohol/week: 7.0 standard drinks of alcohol     Comment: Alcoholic Drinks/day: One drink a day         Family History   I have reviewed this patient's family history and updated it with pertinent information if needed.  Family History   Problem Relation Age of Onset    Coronary Artery Disease Paternal Grandfather          Allergies   No Known Allergies     Physical Exam   Vital Signs: Temp: 97.3  F (36.3  C) Temp src: Temporal BP: (!) 171/96 Pulse: 104   Resp: 25 SpO2: 96 % O2 Device: Nasal cannula Oxygen Delivery: 2 LPM  Weight: 186 lbs 0 oz      General Aox3, appropriate affect, NAD, on RA  HEENT  MMM, EOMI, PERRL  Chest Adeq E b/l, No wheezing  Heart RRR, No M/R/G  Abd- Soft,diffusely tender, BS decreased  - Deferred,   Extremity- Moving all extremities, No digital clubbing,   No edema  Neuro- Aox3, ,  gait not checked  Skin  Has no tattoo, No skin rash     Medical Decision Making       85 MINUTES SPENT BY ME on the date of service doing chart review, history, exam, documentation & further activities per the note.      ------------------ MEDICAL DECISION MAKING ------------------------------------------------------------------------------------------------------  MANAGEMENT DISCUSSED with the following over the past 24 hours: Patient and care team       Data   ------------------------- PAST 24 HR DATA REVIEWED -----------------------------------------------    I have personally reviewed the following data over the past 24 hrs:    7.9  \   15.8   / 172     140 98 7.2 /  147 (H)   3.2 (L) 21 (L) 0.76 \     ALT: 203 (H) AST: 274 (H) AP: 138 TBILI: 0.5   ALB: 4.5 TOT PROTEIN: 8.4 (H) LIPASE: 1,207 (H)       Imaging results reviewed over the past 24 hrs:   No results found for this or any previous visit (from the past 24 hour(s)).

## 2024-07-24 NOTE — PROGRESS NOTES
[facility]    Medicine Progress Note - Hospitalist Service    Date of Admission:  7/23/2024    Assessment & Plan   44-year-old male with a significant medical history of hypertension hyperlipidemia, gastroesophageal reflux disease, EtOH abuse and alcohol withdrawal syndrome who is admitted  due to alcohol related acute pancreatitis     Acute necrotizing pancreatitis likely alcohol induced  -Patient is tachycardic, mild leukocytosis, lactic acidosis - likely SIRS response to pancreatitis  -Lipase elevated  -CT abdomen pelvis shows acute pancreatitis.  Findings suggestive of necrotizing pancreatitis.  Borderline left retroperitoneal lymph nodes  -Received 3.5L IV fluids  -NPO, IV Fluids  -Seen by GI, appreciate recommendations  -Possible ileus related to pancreatitis, can be on ice chips and water  -Enteral feeding with NJ, can potentially decrease bacterial translocation and morbidity mortality - pending NJ placement and RD consult  -Antiemetics as needed  -EKG pending  -Pain management with Dilaudid    Addendum: Enteric tube was placed at the tip of distal gastric antrum.  Unable to advance as patient was having nausea/emesis  -Patient continued to have nausea/emesis, feeding tube was out  -Discussed with GI, will reattempt placement of NJ tube, as early enteral feeding can decrease bacterial translocation and mortality  -Will schedule Compazine every 6 hours and premedicate prior to placement and initiate enteral feeding      HTN  Hypertensive urgency likely 2/2 necrotizing pancreatitis and suspect alcohol withdrawal  -On Clonidine 0.2mg q8hr, resumed PTA Lisinopril -hydrochlorothiazide  -Hydralazine as needed with holding parameters  -BP uncontrollable, started Nicardipine gtt with SBP goal 170  -Will monitor and taper off    EtOH abuse  Transaminitis - improving  Has been drinking vodka daily, last drink 07/23/24  Likely to withdraw from 07/25, denies history of alcohol withdrawal  Hold for rbb   seizures or DTs  blood alcohol level high on presentation  Transaminitis, T.bili normal  Patient placed on CIWA protocol - Ativan  thiamine, folate, MVI  Trend LFT's  chemical dependency consult    High Anion gap metabolic acidosis  Lactic acidosis   Suspect 2/2 hypovolemia due to pancreatitis  AG 21 -> 18  On IV fluids  Monitor AG and lactic acid - pending repeat labs    Hypokalemia  Hypomagnesemia  Monitor and replete as needed    Mild thrombocytopenia  2/2 alcohol use vs dilutional  Monitor Platelets    GERD  Continue PPI    HLD  Resume statin when LFTs are improving                Diet: NPO for Medical/Clinical Reasons Except for: Ice Chips, Meds, Other; Specify: water    DVT Prophylaxis: Pneumatic Compression Devices  Mancia Catheter: Not present  Lines: None     Cardiac Monitoring: None  Code Status: Full Code      Clinically Significant Risk Factors Present on Admission        # Hypokalemia: Lowest K = 3.2 mmol/L in last 2 days, will replace as needed     # Hypomagnesemia: Lowest Mg = 1.4 mg/dL in last 2 days, will replace as needed  # Anion Gap Metabolic Acidosis: Highest Anion Gap = 21 mmol/L in last 2 days, will monitor and treat as appropriate      # Hypertension: Noted on problem list   # Acute Respiratory Failure: Documented O2 saturation < 91%.  Continue supplemental oxygen as needed            # Overweight: Estimated body mass index is 25.23 kg/m  as calculated from the following:    Height as of this encounter: 1.829 m (6').    Weight as of this encounter: 84.4 kg (186 lb).       # Financial/Environmental Concerns: none         Disposition Plan     Medically Ready for Discharge: Anticipated in 2-4 Days             Petrona Reeder MD  Hospitalist Service  [facility]  Securely message with [employer] (more info)  Text page via [facility] Paging/Directory   ______________________________________________________________________    Interval History   Patient was examined at the bedside,  new to me today.  Has acute abdominal pain.  Found to have necrotizing pancreatitis, seen by GI.  On IV fluids.  Started on nicardipine drip due to hypertensive urgency.  SIRS response to necrotizing pancreatitis, will monitor    Physical Exam   Vital Signs: Temp: 98  F (36.7  C) Temp src: Oral BP: 137/80 Pulse: (!) 124   Resp: (!) 33 SpO2: 93 % O2 Device: None (Room air) Oxygen Delivery: 2 LPM  Weight: 186 lbs 0 oz    General Aox3, distress due to pain  Lungs: CTAB  Heart RRR, No M/R/G  Abd- Soft,diffusely tender, BS decreased  Extremity- Moving all extremities, no edema  Neuro- Aox3    Medical Decision Making       60 MINUTES SPENT BY ME on the date of service doing chart review, history, exam, documentation & further activities per the note.      Data     I have personally reviewed the following data over the past 24 hrs:    11.5 (H)  \   14.2   / 141 (L)     139 101 8.2 /  177 (H)   3.2 (L) 20 (L) 0.70 \     ALT: 163 (H) AST: 200 (H) AP: 118 TBILI: 0.6   ALB: 4.2 TOT PROTEIN: 7.6 LIPASE: 1,425 (H)     Procal: N/A CRP: N/A Lactic Acid: 6.0 (HH)         Imaging results reviewed over the past 24 hrs:   Recent Results (from the past 24 hour(s))   CT Abdomen Pelvis w Contrast    Narrative    EXAM: CT ABDOMEN PELVIS W CONTRAST  LOCATION: [facility]  DATE: 7/24/2024    INDICATION: acute pancreatitis  COMPARISON: None.  TECHNIQUE: CT scan of the abdomen and pelvis was performed following injection of IV contrast. Multiplanar reformats were obtained. Dose reduction techniques were used.  CONTRAST: IsoVue 370 90mL    FINDINGS:   LOWER CHEST: Basilar atelectasis.    HEPATOBILIARY: Hepatic steatosis.    PANCREAS: Peripancreatic inflammation and adjacent fluid consistent with acute pancreatitis. Prominent area of hypoenhancement pancreatic head and uncinate process as 3 image 1074.3 x 2.9 cm in size.    SPLEEN: Normal.    ADRENAL GLANDS: Normal.    KIDNEYS/BLADDER: Normal.    BOWEL: Duodenal wall  thickening and wall thickening hepatic flexure of colon presumably secondary to pancreatic inflammation. Bowel is normal caliber.    LYMPH NODES: Borderline left retroperitoneal lymph node.    VASCULATURE: Atherosclerotic vascular calcification    PELVIC ORGANS: Small amount of pelvic free fluid. Postsurgical change scrotum.    MUSCULOSKELETAL: Degenerative change osseous structures.      Impression    IMPRESSION:   1.  Acute pancreatitis. Prominent hypoenhancement pancreatic head and uncinate process could be due to area of necrotizing pancreatitis. Follow-up to confirm resolution is necessary to exclude underlying mass.  2.  Wall thickening duodenum and hepatic flexure of colon presumably secondary to pancreatic inflammation.  3.  Borderline left retroperitoneal lymph nodes.

## 2024-07-24 NOTE — CONSULTS
[facility] consult     Impression: 1.  Acute pancreatitis-there may be some early necrosis.  His calculated [place] 2 score is 12 (elevated, suggesting also 15% mortality risk).  Initially, BUN greater than 44 which is concerning.  This has decreased after lactated Ringer's.  He has decreased bowel sounds, possible ileus related to the pancreatitis.  He could be on ice chips and water, is much as he wants as long as he does not get nausea (which could be potentially related to an ileus), but that will not stimulate the pancreas.  Potentially this could be a prolonged process given the potential severity of the pancreatitis.  We should start enteral feeding ASAP with NJ feeding.  This can decrease bacterial translocation, potentially decrease morbidity mortality.  NJ feedings ordered.  N.p.o. otherwise.  Continue with lactated Ringer's IV fluid.    2.  Alcoholism-patient is currently going through alcohol withdrawal, on protocol.  He is planning on chemical dependency treatment after this admission which is appropriate.    Recommendation: NJ tube feeding, otherwise n.p.o. except for ice chips and water.  Medications are okay.  Continue IV lactated Ringer's, pain medications as needed.    Name: Chandler Branch    Medical Record #: [id number]    YOB: 1980    Date/Time: 7/24/2024/2:19 PM    Reason for Consultation: Petrona Reeder MD has asked me to evaluate Chandler Branch regarding acute pancreatitis.    HPI: This is a 44-year-old male with history of alcoholism, hypertension, hyperlipidemia, GERD who was admitted with acute pancreatitis.  The patient was scheduled to have a colonoscopy today for colon cancer screening.  I do not think he did the preparation and also because of the admission for pancreatitis, that has been canceled for now.    He states he drinks vodka every day and also beer.  He had been through chemical dependency treatment once, he states that was in seventh grade.  He also  "states that he is planning on proceeding with repeat chemical dependency treatment for alcohol as soon as he is discharged from here.    Since last week he has had abdominal pain.  He points to his entire abdomen and states it feels like \"making spaghetti\", like something grabbing and twisting.  It does not radiate to his back.  He has been having nausea and vomiting, that seems to be lessening.  No fever, chills, sweats.  He has had some night sweats.    Review of Systems (ROS): Complete ROS positive for him awakening every night at 3:30 AM for unclear reasons, otherwise negative except for as above.    Past Medical History:  Patient Active Problem List    Diagnosis Date Noted    Alcohol-induced acute pancreatitis without infection or necrosis 07/24/2024     Priority: Medium    Hypomagnesemia 07/24/2024     Priority: Medium    Alcoholic hepatitis without ascites (H28) 07/24/2024     Priority: Medium    Hyponatremia 02/23/2022     Priority: Medium    Acute kidney injury (H24) 02/23/2022     Priority: Medium    Alcohol withdrawal syndrome with complication (H) 02/23/2022     Priority: Medium    Cervical radiculopathy 04/19/2016     Priority: Medium    Accelerated hypertension      Priority: Medium    Hypokalemia      Priority: Medium       Medications:   (Not in a hospital admission)      Current Facility-Administered Medications:     calcium carbonate (TUMS) chewable tablet 1,000 mg, 1,000 mg, Oral, 4x Daily PRN, Madyson Graff MD    cloNIDine (CATAPRES) tablet 0.2 mg, 0.2 mg, Oral, Q8H, Petrona Reeder MD, 0.2 mg at 07/24/24 1046    dextrose 5% in lactated ringers infusion, , Intravenous, Continuous, Petrona Reeder MD, Last Rate: 150 mL/hr at 07/24/24 1023, Rate Change at 07/24/24 1023    glucose gel 15-30 g, 15-30 g, Oral, Q15 Min PRN **OR** dextrose 50 % injection 25-50 mL, 25-50 mL, Intravenous, Q15 Min PRN **OR** glucagon injection 1 mg, 1 mg, Subcutaneous, Q15 Min PRN, Madyson Graff MD    flumazenil " (ROMAZICON) injection 0.2 mg, 0.2 mg, Intravenous, q1 min prn, Madyson Graff MD    [START ON 7/25/2024] folic acid (FOLVITE) tablet 1 mg, 1 mg, Oral, Daily, Madyson Graff MD    OLANZapine zydis (zyPREXA) ODT tab 5-10 mg, 5-10 mg, Oral, Q6H PRN **OR** haloperidol lactate (HALDOL) injection 2.5-5 mg, 2.5-5 mg, Intravenous, Q6H PRN, Madyson Graff MD    hydrALAZINE (APRESOLINE) injection 10 mg, 10 mg, Intravenous, Q6H PRN, Petrona Reeder MD, 10 mg at 07/24/24 1045    HYDROmorphone (DILAUDID) injection 0.2 mg, 0.2 mg, Intravenous, Q3H PRN, Petrona Reeder MD    HYDROmorphone (DILAUDID) injection 0.4 mg, 0.4 mg, Intravenous, Q3H PRN, Petrona Reeder MD, 0.4 mg at 07/24/24 1046    labetalol (NORMODYNE/TRANDATE) injection 20 mg, 20 mg, Intravenous, Q6H PRN, Petrona Reeder MD, 20 mg at 07/24/24 1129    lidocaine (LMX4) cream, , Topical, Q1H PRN, Madyson Graff MD    lidocaine 1 % 0.1-1 mL, 0.1-1 mL, Other, Q1H PRN, Madyson Graff MD    lisinopril-hydrochlorothiazide (ZESTORETIC) 10-12.5 MG per tablet 1 tablet, 1 tablet, Oral, Daily, Petrona Reeder MD, 1 tablet at 07/24/24 1106    LORazepam (ATIVAN) tablet 1-2 mg, 1-2 mg, Oral, Q30 Min PRN **OR** LORazepam (ATIVAN) injection 1-2 mg, 1-2 mg, Intravenous, Q30 Min PRN, Madyson Graff MD, 2 mg at 07/24/24 1146    melatonin tablet 5 mg, 5 mg, Oral, QPM PRN, Madyson Graff MD    [START ON 7/25/2024] multivitamin w/minerals (THERA-VIT-M) tablet 1 tablet, 1 tablet, Oral, Daily, Madyson Graff MD    naloxone (NARCAN) injection 0.2 mg, 0.2 mg, Intravenous, Q2 Min PRN **OR** naloxone (NARCAN) injection 0.4 mg, 0.4 mg, Intravenous, Q2 Min PRN **OR** naloxone (NARCAN) injection 0.2 mg, 0.2 mg, Intramuscular, Q2 Min PRN **OR** naloxone (NARCAN) injection 0.4 mg, 0.4 mg, Intramuscular, Q2 Min PRN, Madyson Graff MD    niCARdipine 40 mg in 200 mL NS (CARDENE) infusion, 0.5-15 mg/hr, Intravenous, Continuous, PonPetrona daly MD    ondansetron (ZOFRAN ODT) ODT tab 4  mg, 4 mg, Oral, Q6H PRN **OR** ondansetron (ZOFRAN) injection 4 mg, 4 mg, Intravenous, Q6H PRN, Madyson Graff MD, 4 mg at 07/24/24 0847    pantoprazole (PROTONIX) IV push injection 40 mg, 40 mg, Intravenous, BID, Petrona Reeder MD, 40 mg at 07/24/24 0858    prochlorperazine (COMPAZINE) injection 5 mg, 5 mg, Intravenous, Q6H PRN, 5 mg at 07/24/24 0459 **OR** prochlorperazine (COMPAZINE) tablet 5 mg, 5 mg, Oral, Q6H PRN **OR** prochlorperazine (COMPAZINE) suppository 25 mg, 25 mg, Rectal, Q12H PRN, Madyson Graff MD    senna-docusate (SENOKOT-S/PERICOLACE) 8.6-50 MG per tablet 1 tablet, 1 tablet, Oral, BID PRN **OR** senna-docusate (SENOKOT-S/PERICOLACE) 8.6-50 MG per tablet 2 tablet, 2 tablet, Oral, BID PRN, Madyson Graff MD    sodium chloride (PF) 0.9% PF flush 3 mL, 3 mL, Intracatheter, Q8H, Madyson Graff MD, 3 mL at 07/24/24 0459    sodium chloride (PF) 0.9% PF flush 3 mL, 3 mL, Intracatheter, q1 min prn, Madyson Graff MD    [START ON 7/25/2024] thiamine (B-1) tablet 100 mg, 100 mg, Oral, Daily, Madyson Graff MD    Current Outpatient Medications:     acetaminophen (TYLENOL) 500 MG tablet, Take 500-1,000 mg by mouth every 6 hours as needed for mild pain, Disp: , Rfl:     lisinopril-hydrochlorothiazide (ZESTORETIC) 10-12.5 MG tablet, Take 1 tablet by mouth daily, Disp: 30 tablet, Rfl: 0    omeprazole (PRILOSEC) 20 MG DR capsule, Take 20 mg by mouth daily, Disp: , Rfl:     traZODone (DESYREL) 50 MG tablet, Take 50 mg by mouth nightly as needed for sleep, Disp: , Rfl:        Allergies: Patient has no known allergies.    Family History:  Family History   Problem Relation Age of Onset    Coronary Artery Disease Paternal Grandfather         Social History:  Social History     Tobacco Use    Smoking status: Never   Substance Use Topics    Alcohol use: Yes     Alcohol/week: 7.0 standard drinks of alcohol     Comment: Alcoholic Drinks/day: One drink a day          Physical Exam: BP (!) 198/112   Pulse (!) 126  "  Temp 98  F (36.7  C) (Oral)   Resp (!) 44   Ht 1.829 m (6')   Wt 84.4 kg (186 lb)   SpO2 94%   BMI 25.23 kg/m      General: NAD  Eyes: No scleral icterus or conjunctivitis  Oropharynx: WNL  Neck/Thyroid: No neck masses or thyromegaly  Pulmonary: Lungs are clear to auscultation bilaterally  Cardiovascular: Regular, no lower extermity edema   Gastrointestinal: Decreased bowel sounds, soft, diffusely tender, but mostly in the upper abdomen, no rebound or guarding. No HSM.  Lymph nodes: No cervical or supraclavicular lymphadenopathy  Skin: The patient is not jaundiced.  No Abilio or Landis Jimenez sign.  Back: No spinal/paraspinal tenderness, no CVA tenderness.  Neurologic: Alert and oriented ×3    Labs:    CBC RESULTS:   Recent Labs   Lab Test 07/24/24  0801   WBC 11.5*   RBC 4.42   HGB 14.2   HCT 41.7   MCV 94   MCH 32.1   MCHC 34.1   RDW 12.7   *        CMP Results:   Recent Labs   Lab Test 07/24/24  0801      POTASSIUM 3.2*   CHLORIDE 101   CO2 20*   ANIONGAP 18*   *   BUN 8.2   CR 0.70   BILITOTAL 0.6   ALKPHOS 118   *   *        INR Results: No results for input(s): \"INR\" in the last 59054 hours.       Radiology: CT Abdomen Pelvis w Contrast    Result Date: 7/24/2024  EXAM: CT ABDOMEN PELVIS W CONTRAST LOCATION: [facility] DATE: 7/24/2024 INDICATION: acute pancreatitis COMPARISON: None. TECHNIQUE: CT scan of the abdomen and pelvis was performed following injection of IV contrast. Multiplanar reformats were obtained. Dose reduction techniques were used. CONTRAST: IsoVue 370 90mL FINDINGS: LOWER CHEST: Basilar atelectasis. HEPATOBILIARY: Hepatic steatosis. PANCREAS: Peripancreatic inflammation and adjacent fluid consistent with acute pancreatitis. Prominent area of hypoenhancement pancreatic head and uncinate process as 3 image 1074.3 x 2.9 cm in size. SPLEEN: Normal. ADRENAL GLANDS: Normal. KIDNEYS/BLADDER: Normal. BOWEL: Duodenal wall thickening and " wall thickening hepatic flexure of colon presumably secondary to pancreatic inflammation. Bowel is normal caliber. LYMPH NODES: Borderline left retroperitoneal lymph node. VASCULATURE: Atherosclerotic vascular calcification PELVIC ORGANS: Small amount of pelvic free fluid. Postsurgical change scrotum. MUSCULOSKELETAL: Degenerative change osseous structures.     IMPRESSION: 1.  Acute pancreatitis. Prominent hypoenhancement pancreatic head and uncinate process could be due to area of necrotizing pancreatitis. Follow-up to confirm resolution is necessary to exclude underlying mass. 2.  Wall thickening duodenum and hepatic flexure of colon presumably secondary to pancreatic inflammation. 3.  Borderline left retroperitoneal lymph nodes.       The total time spent with this patient was 45 minutes                                             Heriberto Soto M.D.  Thank you for the opportunity to participate in the care of this patient.   Please feel free to call me with any questions or concerns.  Phone number [phone].

## 2024-07-24 NOTE — PLAN OF CARE
Problem: Alcohol Withdrawal  Goal: Alcohol Withdrawal Symptom Control  Outcome: Progressing     Problem: Comorbidity Management  Goal: Blood Pressure in Desired Range  Outcome: Progressing     Problem: Pain Acute  Goal: Optimal Pain Control and Function  Outcome: Progressing  Intervention: Develop Pain Management Plan  Recent Flowsheet Documentation  Taken 7/24/2024 1230 by Jovanny Jay RN  Pain Management Interventions: medication (see MAR)   Goal Outcome Evaluation:       Pt AxOx4 on room air but forgetful. Reports severe abd pain and constant nausea, PRN IV dilaudid given and IV zofran given to some effect. Vomiting episode x 4 with brown emesis. Hypertensive in the 200s and tachycardic in the 130s-160s with lactic acid of 6, MD paged, 1,000 ml LR boluses given as well as multiple antihypertensives including PRN labetalol, see MAR for details. Repeat lactic acid at 4.8. CT abdomen done. CIWA scoring 13 or greater, IV ativan given x 2. GI consulted and ordered NJ tube insertion with TF. Potassium and magnesium replaced. D5LR infusing at 150 ml/hr. Started on nicardipine gtt and transferred to ICU for better BP control with goal to keep SBP at 170. NPO except for water and ice chips. Voiding.

## 2024-07-24 NOTE — ED NOTES
Patient monitored to CT. Patient remained in Sinus Tach. Patient had 3 episodes of emesis which was clear liquid.

## 2024-07-24 NOTE — ED PROVIDER NOTES
EMERGENCY DEPARTMENT ENCOUNTER      NAME: Chandler Branch  AGE: 44 year old male  YOB: 1980  MRN: [id number]  EVALUATION DATE & TIME: 7/23/2024 11:50 PM    PCP: Mame Dougherty    ED PROVIDER: Adilson Burns MD      Chief Complaint   Patient presents with    Abdominal Pain    Nausea, Vomiting, & Diarrhea         FINAL IMPRESSION:  1. Alcohol-induced acute pancreatitis without infection or necrosis          ED COURSE & MEDICAL DECISION MAKING:    Pertinent Labs & Imaging studies reviewed. (See chart for details)  44 year old male presents to the Emergency Department for evaluation of abd pain, nausea/vomiting    ED Course as of 07/24/24 0259   Wed Jul 24, 2024   0004 Patient is a 44-year-old male with a past medical history significant for alcohol abuse who presents the emergency department with nausea, vomiting, left-sided abdominal pain the past couple days.  He is actively vomiting on arrival, has tenderness to palpation of his epigastrium and left upper quadrant.  Differential diagnosis includes pancreatitis, alcoholic gastritis.  Without urinary symptoms or fever, lower suspicion of kidney stone.  Will treat with fluids, Zofran, Dilaudid while evaluating the above differential.   0043 Patient's lipase is 1207.  His alcohol level is 0.33.  There is evidence of alcoholic hepatitis with his AST at 274 and ALT at 203.  He has mild hypokalemia at 3.2 that does not require emergent intervention.  A mild anion gap elevation, likely due to alcoholic ketoacidosis.  No kidney injury.  No leukocytosis or anemia.  Do not think imaging is indicated now that the source of his symptoms is identified as alcoholic pancreatitis.  Will reassess after symptom control.   0229 Patient's nausea and vomiting is improved, however his pain is still not well-controlled after 3 doses of 0.5 mg Dilaudid.  Will admit to the hospital at this time for further management         Medical Decision Making  Obtained supplemental  history:Supplemental history obtained?: Documented in chart and Family Member/Significant Other  Reviewed external records: External records reviewed?: Documented in chart  Care impacted by chronic illness:Hyperlipidemia, Hypertension, and Other: GERD  Care significantly affected by social determinants of health:Access to Medical Care and Alcohol Abuse and/or Recreational Drug Use  Did you consider but not order tests?: Work up considered but not performed and documented in chart, if applicable  Did you interpret images independently?: Independent interpretation of ECG and images noted in documentation, when applicable.  Consultation discussion with other provider:Did you involve another provider (consultant, , pharmacy, etc.)?: I discussed the care with another health care provider, see documentation for details.  Admit.        At the conclusion of the encounter I discussed the results of all of the tests and the disposition. The questions were answered. The patient or family acknowledged understanding and was agreeable with the care plan.     0 minutes of critical care time     MEDICATIONS GIVEN IN THE EMERGENCY:  Medications   sodium chloride 0.9% BOLUS 1,000 mL (0 mLs Intravenous Stopped 7/24/24 0218)   ondansetron (ZOFRAN) injection 4 mg (4 mg Intravenous $Given 7/24/24 0221)   HYDROmorphone (DILAUDID) injection 0.5 mg (0.5 mg Intravenous $Given 7/24/24 0221)       NEW PRESCRIPTIONS STARTED AT TODAY'S ER VISIT  New Prescriptions    No medications on file          =================================================================    HPI    Patient information was obtained from: The patient    Use of : N/A       Chandler Branch is a 44 year old male with a pertinent history of HTN, HLD, GERD, Alcohol withdrawal syndrome, who presents to this ED via walk-in for evaluation of abdominal pain, nausea, vomiting, and diarrhea.    The patient reported he has dealing with localized left-sided abdominal pain,  nausea, and vomiting for the past couple of days. He had about 16 non-bloody vomiting episodes throughout the day. His girlfriend reported the patient drinks about 4-5 alcoholic beverages per day. He denies every having alcohol withdrawal seizures. He does have an upcoming appointment with GI. He has not had a colonoscopy done. No history of abdominal surgeries. No recreational drug use.    Otherwise, the patient denied having diarrhea, abnormal stools, and any other medical complaints at this time.        PAST MEDICAL HISTORY:  Past Medical History:   Diagnosis Date    Accelerated hypertension     Acute kidney injury (H) 2/23/2022    Alcohol withdrawal syndrome with complication (H) 2/23/2022    Cervical radiculopathy 4/19/2016    Hypokalemia     Hyponatremia 2/23/2022       PAST SURGICAL HISTORY:  Past Surgical History:   Procedure Laterality Date    BACK SURGERY      IR CERVICAL EPIDURAL STEROID INJECTION  4/21/2016           CURRENT MEDICATIONS:    atorvastatin (LIPITOR) 40 MG tablet  lisinopril-hydrochlorothiazide (ZESTORETIC) 10-12.5 MG tablet  LORazepam (ATIVAN) 1 MG tablet  ondansetron (ZOFRAN) 4 MG tablet        ALLERGIES:  No Known Allergies    FAMILY HISTORY:  Family History   Problem Relation Age of Onset    Coronary Artery Disease Paternal Grandfather        SOCIAL HISTORY:   Social History     Socioeconomic History    Marital status:     Number of children: 3   Occupational History    Occupation:    Tobacco Use    Smoking status: Never   Substance and Sexual Activity    Alcohol use: Yes     Alcohol/week: 7.0 standard drinks of alcohol     Comment: Alcoholic Drinks/day: One drink a day   Social History Narrative    He is currently in the  (25 years). He is a single father of 3 kids. He is .       VITALS:  BP (!) 171/96   Pulse 104   Temp 97.3  F (36.3  C) (Temporal)   Resp 25   Ht 1.829 m (6')   Wt 84.4 kg (186 lb)   SpO2 96%   BMI 25.23 kg/m      PHYSICAL EXAM     Physical Exam  Vitals and nursing note reviewed.   Constitutional:       General: He is not in acute distress.     Appearance: Normal appearance. He is normal weight. He is not ill-appearing.   HENT:      Head: Normocephalic and atraumatic.      Nose: Nose normal.      Mouth/Throat:      Mouth: Mucous membranes are dry.      Pharynx: Oropharynx is clear.   Eyes:      Extraocular Movements: Extraocular movements intact.      Conjunctiva/sclera: Conjunctivae normal.      Pupils: Pupils are equal, round, and reactive to light.   Cardiovascular:      Rate and Rhythm: Normal rate and regular rhythm.      Pulses: Normal pulses.      Heart sounds: Normal heart sounds. No murmur heard.  Pulmonary:      Effort: Pulmonary effort is normal. No respiratory distress.      Breath sounds: Normal breath sounds.   Abdominal:      General: Abdomen is flat. There is no distension.      Palpations: Abdomen is soft.      Tenderness: There is abdominal tenderness (epigastric). There is no right CVA tenderness, left CVA tenderness, guarding or rebound.   Musculoskeletal:         General: Normal range of motion.      Cervical back: Normal range of motion.      Right lower leg: No edema.      Left lower leg: No edema.   Skin:     General: Skin is warm and dry.      Capillary Refill: Capillary refill takes less than 2 seconds.      Coloration: Skin is not jaundiced or pale.   Neurological:      General: No focal deficit present.      Mental Status: He is alert and oriented to person, place, and time. Mental status is at baseline.   Psychiatric:         Mood and Affect: Mood normal.         Behavior: Behavior normal.         Thought Content: Thought content normal.         Judgment: Judgment normal.            LAB:  All pertinent labs reviewed and interpreted.  Results for orders placed or performed during the hospital encounter of 07/23/24   Basic metabolic panel   Result Value Ref Range    Sodium 140 135 - 145 mmol/L    Potassium 3.2 (L)  3.4 - 5.3 mmol/L    Chloride 98 98 - 107 mmol/L    Carbon Dioxide (CO2) 21 (L) 22 - 29 mmol/L    Anion Gap 21 (H) 7 - 15 mmol/L    Urea Nitrogen 7.2 6.0 - 20.0 mg/dL    Creatinine 0.76 0.67 - 1.17 mg/dL    GFR Estimate >90 >60 mL/min/1.73m2    Calcium 9.3 8.8 - 10.4 mg/dL    Glucose 147 (H) 70 - 99 mg/dL   Ethyl Alcohol Level   Result Value Ref Range    Alcohol ethyl 0.33 (HH) <=0.01 g/dL   Hepatic function panel   Result Value Ref Range    Protein Total 8.4 (H) 6.4 - 8.3 g/dL    Albumin 4.5 3.5 - 5.2 g/dL    Bilirubin Total 0.5 <=1.2 mg/dL    Alkaline Phosphatase 138 40 - 150 U/L     (H) 0 - 45 U/L     (H) 0 - 70 U/L    Bilirubin Direct 0.23 0.00 - 0.30 mg/dL   Result Value Ref Range    Lipase 1,207 (H) 13 - 60 U/L   CBC with platelets and differential   Result Value Ref Range    WBC Count 7.9 4.0 - 11.0 10e3/uL    RBC Count 4.89 4.40 - 5.90 10e6/uL    Hemoglobin 15.8 13.3 - 17.7 g/dL    Hematocrit 45.3 40.0 - 53.0 %    MCV 93 78 - 100 fL    MCH 32.3 26.5 - 33.0 pg    MCHC 34.9 31.5 - 36.5 g/dL    RDW 12.4 10.0 - 15.0 %    Platelet Count 172 150 - 450 10e3/uL    % Neutrophils 79 %    % Lymphocytes 14 %    % Monocytes 6 %    % Eosinophils 0 %    % Basophils 1 %    % Immature Granulocytes 0 %    NRBCs per 100 WBC 0 <1 /100    Absolute Neutrophils 6.2 1.6 - 8.3 10e3/uL    Absolute Lymphocytes 1.1 0.8 - 5.3 10e3/uL    Absolute Monocytes 0.5 0.0 - 1.3 10e3/uL    Absolute Eosinophils 0.0 0.0 - 0.7 10e3/uL    Absolute Basophils 0.1 0.0 - 0.2 10e3/uL    Absolute Immature Granulocytes 0.0 <=0.4 10e3/uL    Absolute NRBCs 0.0 10e3/uL       RADIOLOGY:  Reviewed all pertinent imaging. Please see official radiology report.  No orders to display       PROCEDURES:   None      [facility] Documentation:   CMS Diagnoses:               I, Aguustus Lentz, am serving as a scribe to document services personally performed by Adilson Burns MD  based on my observation and the provider's statements to me. I,  Adilson Burns, attest that Augustus Lentz is acting in a scribe capacity, has observed my performance of the services and has documented them in accordance with my direction.    Adilson Burns MD  [facility] EMERGENCY DEPARTMENT  [address] [place] [id number]  [phone]       Adilson Burns MD  07/24/24 0259

## 2024-07-24 NOTE — PHARMACY-ADMISSION MEDICATION HISTORY
Pharmacist Admission Medication History    Admission medication history is complete. The information provided in this note is only as accurate as the sources available at the time of the update.    Information Source(s): Patient, Hospital records, and CareEverywhere/SureScripts via in-person    Pertinent Information:     Patient acknowledges non-compliance with his medications.      Changes made to PTA medication list:  Added: all medications  Deleted: None  Changed: None    Allergies reviewed with patient and updates made in EHR: yes    Medication History Completed By: Daryn Gabriel RPH 7/24/2024 8:24 AM    PTA Med List   Medication Sig Last Dose    acetaminophen (TYLENOL) 500 MG tablet Take 500-1,000 mg by mouth every 6 hours as needed for mild pain Unknown    lisinopril-hydrochlorothiazide (ZESTORETIC) 10-12.5 MG tablet Take 1 tablet by mouth daily Past Week    omeprazole (PRILOSEC) 20 MG DR capsule Take 20 mg by mouth daily Past Week    traZODone (DESYREL) 50 MG tablet Take 50 mg by mouth nightly as needed for sleep Past Month

## 2024-07-24 NOTE — PROGRESS NOTES
"Informed staff he is requesting to talk tomorrow.   AMELIA Redd on 7/24/2024 at 12:17 PM      Attempted to call patient to discuss possible EMILY treatment options and to possibly complete an assessment. Was informed by hospital staff \"he is very sick right now, they just about did a rapid response on him\".  Will continue to follow and may try again later this afternoon or tomorrow depending on availability and patient notes.   AMELIA Redd on 7/24/2024 at 9:55 AM         "

## 2024-07-24 NOTE — PROGRESS NOTES
Ativan held at 10 am and 1215 pm d/t pt being very drowsy, provider aware. Pt down to stat CT abdomen at 930am.

## 2024-07-24 NOTE — PLAN OF CARE
Goal Outcome Evaluation:    Problem: Comorbidity Management  Goal: Blood Pressure in Desired Range  Outcome: Not Progressing  Intervention: Maintain Blood Pressure Management  Recent Flowsheet Documentation  Taken 7/24/2024 0415 by Crystal Ricketts RN  Medication Review/Management: medications reviewed     Problem: Pain Acute  Goal: Optimal Pain Control and Function  Outcome: Not Progressing  Intervention: Develop Pain Management Plan  Recent Flowsheet Documentation  Taken 7/24/2024 0512 by Crystal Ricketts, RN  Pain Management Interventions:   rest   repositioned  Taken 7/24/2024 0437 by Crystal Ricketts, RN  Pain Management Interventions: medication (see MAR)  Intervention: Prevent or Manage Pain  Recent Flowsheet Documentation  Taken 7/24/2024 0415 by Crystal Ricketts, RN  Medication Review/Management: medications reviewed     Problem: Alcohol Withdrawal  Goal: Alcohol Withdrawal Symptom Control  Outcome: Progressing  Goal: Optimal Neurologic Function  Outcome: Progressing          Patient transferred to his room, still had n+v despite getting 12mg Zofran in the ER. Was also c/o severe pain 9-10/10 despite three doses of PRN dilaudid in ER. When inpatient orders were entered, zofran was the only antiemetic available, and it was 5 hours to soon to give it. Contacted MD for PRN compazine.   Gave PRN dilaudid 0.4mg for abdominal pain 10/10 x1. MD ordered one time dose of IV dilaudid 1mg at 7am, wanted it given right away; administered.     Patient was running LR, which was discontinued, then started D5 in LR at 150 ml/hr. Was running IV magnesium and potassium for replacement protocol this AM. All fluids were paused at 07:00 because MD wanted 1L LR bolus started immediately and patient only had one PIV. Informed AM nurse in report.     CIWA score was 20, patient was given 2mg IV ativan, follow up score was 6. BP elevated, but scheduled clonopin was held because patient was vomiting. Patient was tachycardic in the  low 100s. Otherwise VSS on RA.     BP (!) 160/99   Pulse 104   Temp 98  F (36.7  C) (Oral)   Resp 25   Ht 1.829 m (6')   Wt 84.4 kg (186 lb)   SpO2 98%   BMI 25.23 kg/m

## 2024-07-24 NOTE — CONSULTS
Care Management Initial Consult    General Information  Assessment completed with: Chandler Hansen  Type of CM/SW Visit: Initial Assessment    Primary Care Provider verified and updated as needed: Yes   Readmission within the last 30 days: no previous admission in last 30 days      Reason for Consult: discharge planning  Advance Care Planning: Advance Care Planning Reviewed:  (Does not have)  Does not want information at this time.  General Information Comments: Wants to be assessed for inpatient CD treatment    Communication Assessment  Patient's communication style: spoken language (English or Bilingual)      Cognitive  Cognitive/Neuro/Behavioral: WDL                      Living Environment:   People in home: child(dorina), dependent     Current living Arrangements: house      Able to return to prior arrangements: yes     Family/Social Support:  Care provided by: self  Provides care for: child(dorina)  Marital Status:   Parent(s), Significant Other          Description of Support System:         Current Resources:   Patient receiving home care services: No     Community Resources: None  Equipment currently used at home: none  Supplies currently used at home: None    Employment/Financial:  Employment Status: employed full-time        Financial Concerns: none   Referral to Financial Worker: No     Does the patient's insurance plan have a 3 day qualifying hospital stay waiver?  No    Lifestyle & Psychosocial Needs:  Social Determinants of Health     Food Insecurity: Not on file   Depression: Not on file   Housing Stability: Not on file   Tobacco Use: Unknown (6/2/2023)    Patient History     Smoking Tobacco Use: Never     Smokeless Tobacco Use: Unknown     Passive Exposure: Not on file   Financial Resource Strain: Not on file   Alcohol Use: Not on file   Transportation Needs: Not on file   Physical Activity: Not on file   Interpersonal Safety: Not on file   Stress: Not on file   Social Connections: Not on file    Health Literacy: Not on file     Functional Status:  Prior to admission patient needed assistance:   Dependent ADLs:: Independent  Dependent IADLs:: Independent     Mental Health Status:  Mental Health Status: No Current Concerns       Chemical Dependency Status:  Chemical Dependency Status: Current Concern  Chemical Dependency Management: Other (see comment) (Requesting IP CD Consult)        Values/Beliefs:  Spiritual, Cultural Beliefs, Uatsdin Practices, Values that affect care: no          Values/Beliefs Comment: Jain    Additional Information:  Writer met with patient to review role of care management services, discuss goals of care and assess need for any possible services at discharge. Patient alert, answering questions appropriately and engaged in the conversation.  Patient is independent with all activities of daily living and instrumental activities of daily living (IADLs) at baseline. His main concern is getting in to treatment. He would like to be evaluated for inpatient CD treatment. He is hoping to speak with someone tomorrow as he is not feeling well today.  Goal is inpatient treatment. Text page sent to MD. Sallie Mak RN

## 2024-07-24 NOTE — ED TRIAGE NOTES
Reports abd pain for last month but states it is much worse tonight. Also reports N/V/D. States he has had 12 emesis today. Pt is Vomiting in triage. Reports ETOH use but states today he drank less than his usual.      Triage Assessment (Adult)       Row Name 07/23/24 6816          Triage Assessment    Airway WDL WDL        Respiratory WDL    Respiratory WDL WDL                      Date Of Previous Biopsy (Optional): 10/12/22

## 2024-07-24 NOTE — CONSULTS
"CLINICAL NUTRITION SERVICES - ASSESSMENT NOTE     Nutrition Prescription    RECOMMENDATIONS FOR MDs/PROVIDERS TO ORDER:    Malnutrition Status:    TBD    Recommendations already ordered by Registered Dietitian (RD):  Initiate Vital 1.5 at 20 mL/hr and advance by 10 mL/hr every 8 hrs to goal rate of 60 mL/hr continuous  Vital 1.5 Fco @ goal of  60ml/hr  (1440ml/day)  will provide: 2160 kcals, 97 g PRO, 1100 ml free H20, 269 g CHO, and 8 g fiber daily.  FWF 30 mL every 4 hrs with IVF    Future/Additional Recommendations:  Monitor TF tolerance, diet advance, weight, labs     REASON FOR ASSESSMENT  Chandler Branch is a/an 44 year old male assessed by the dietitian for Provider Order - Registered Dietitian to Assess and Order TF per Medical Nutrition Therapy Protocol    HPI: 44-year-old male with a significant medical history of hypertension hyperlipidemia, gastroesophageal reflux disease, EtOH abuse and alcohol withdrawal syndrome who is admitted  due to alcohol related acute pancreatitis.     Per GI note, \"...We should start enteral feeding ASAP with NJ feeding. This can decrease bacterial translocation, potentially decrease morbidity mortality. \"    NUTRITION HISTORY  No interview today    CURRENT NUTRITION ORDERS  Diet: NPO  Intake/Tolerance: na    LABS  Labs reviewed. Mg 1.6 (L), ALT (163 (H),  (H), lipase 1425 (H), triglycerides 146     On Mg & K replacement protocols    MEDICATIONS  Medications reviewed, include folic acid, multivitamin with minerals, thiamine, dextrose 5% in LR at 150 mL/hr    ANTHROPOMETRICS  Height: 182.9 cm (6' 0\")  Most Recent Weight: 84.4 kg (186 lb)    IBW: 80.9 kg  BMI: Overweight BMI 25-29.9  Weight History:   Wt Readings from Last 20 Encounters:   07/23/24 84.4 kg (186 lb)   06/02/23 89.8 kg (198 lb)   03/24/23 90.7 kg (200 lb)   02/23/22 91 kg (200 lb 11.2 oz)   Admit wt likely stated, requested weight    Dosing Weight: 84.4 kg    ASSESSED NUTRITION NEEDS  Estimated Energy Needs: " 3230-8227 kcals/day (25 - 28 kcals/kg)  Justification: Maintenance  Estimated Protein Needs:  grams protein/day (1 - 1.5 grams of pro/kg)  Justification: Increased needs  Estimated Fluid Needs: 3948-8969 mL/day (1 mL/kcal)   Justification: Maintenance    PHYSICAL FINDINGS  See malnutrition section below.  Nausea and vomiting    MALNUTRITION:  % Weight Loss:  Weight loss does not meet criteria for malnutrition  % Intake:  need nutrition hx  Subcutaneous Fat Loss:  not done today  Muscle Loss:  not done today  Fluid Retention:  None noted    Malnutrition Diagnosis: Unable to determine due to need admit wt, nutrition hx and NFPE    NUTRITION DIAGNOSIS  Inadequate oral intake related to altered GI function (pancreatitis) as evidenced by NPO and need for enteral nutrition       INTERVENTIONS  Implementation  Nutrition Education: Will be provided if education needs arise   Enteral Nutrition - Initiate     Goals  Tolerate TF  Meet nutrition needs with TF     Monitoring/Evaluation  Progress toward goals will be monitored and evaluated per protocol.

## 2024-07-24 NOTE — PROGRESS NOTES
Shortly after arriving to ICU pt began vomiting and lost his feeding tube.  Hospitalist aware and paging GI for recommendations or new orders to replace feeding tube.

## 2024-07-25 ENCOUNTER — APPOINTMENT (OUTPATIENT)
Dept: RADIOLOGY | Facility: HOSPITAL | Age: 44
End: 2024-07-25
Attending: STUDENT IN AN ORGANIZED HEALTH CARE EDUCATION/TRAINING PROGRAM
Payer: COMMERCIAL

## 2024-07-25 LAB
ALBUMIN SERPL BCG-MCNC: 3.4 G/DL (ref 3.5–5.2)
ALBUMIN UR-MCNC: 50 MG/DL
ALP SERPL-CCNC: 58 U/L (ref 40–150)
ALT SERPL W P-5'-P-CCNC: 90 U/L (ref 0–70)
ANION GAP SERPL CALCULATED.3IONS-SCNC: 10 MMOL/L (ref 7–15)
APPEARANCE UR: CLEAR
AST SERPL W P-5'-P-CCNC: 84 U/L (ref 0–45)
BILIRUB SERPL-MCNC: 1.1 MG/DL
BILIRUB UR QL STRIP: NEGATIVE
BUN SERPL-MCNC: 11.7 MG/DL (ref 6–20)
CALCIUM SERPL-MCNC: 8.1 MG/DL (ref 8.8–10.4)
CHLORIDE SERPL-SCNC: 100 MMOL/L (ref 98–107)
COLOR UR AUTO: ABNORMAL
CREAT SERPL-MCNC: 0.71 MG/DL (ref 0.67–1.17)
EGFRCR SERPLBLD CKD-EPI 2021: >90 ML/MIN/1.73M2
ERYTHROCYTE [DISTWIDTH] IN BLOOD BY AUTOMATED COUNT: 12.9 % (ref 10–15)
GLUCOSE BLDC GLUCOMTR-MCNC: 120 MG/DL (ref 70–99)
GLUCOSE BLDC GLUCOMTR-MCNC: 188 MG/DL (ref 70–99)
GLUCOSE SERPL-MCNC: 179 MG/DL (ref 70–99)
GLUCOSE UR STRIP-MCNC: 50 MG/DL
HCO3 SERPL-SCNC: 25 MMOL/L (ref 22–29)
HCT VFR BLD AUTO: 42 % (ref 40–53)
HGB BLD-MCNC: 14.2 G/DL (ref 13.3–17.7)
HGB UR QL STRIP: NEGATIVE
HYALINE CASTS: 1 /LPF
KETONES UR STRIP-MCNC: ABNORMAL MG/DL
LACTATE SERPL-SCNC: 2.8 MMOL/L (ref 0.7–2)
LEUKOCYTE ESTERASE UR QL STRIP: NEGATIVE
MAGNESIUM SERPL-MCNC: 2.1 MG/DL (ref 1.7–2.3)
MCH RBC QN AUTO: 31.9 PG (ref 26.5–33)
MCHC RBC AUTO-ENTMCNC: 33.8 G/DL (ref 31.5–36.5)
MCV RBC AUTO: 94 FL (ref 78–100)
MRSA DNA SPEC QL NAA+PROBE: NEGATIVE
MUCOUS THREADS #/AREA URNS LPF: PRESENT /LPF
NITRATE UR QL: NEGATIVE
PH UR STRIP: 6.5 [PH] (ref 5–7)
PHOSPHATE SERPL-MCNC: 2 MG/DL (ref 2.5–4.5)
PLATELET # BLD AUTO: 104 10E3/UL (ref 150–450)
POTASSIUM SERPL-SCNC: 3.6 MMOL/L (ref 3.4–5.3)
PROCALCITONIN SERPL IA-MCNC: 0.69 NG/ML
PROT SERPL-MCNC: 6.1 G/DL (ref 6.4–8.3)
RBC # BLD AUTO: 4.45 10E6/UL (ref 4.4–5.9)
RBC URINE: <1 /HPF
SA TARGET DNA: NEGATIVE
SODIUM SERPL-SCNC: 135 MMOL/L (ref 135–145)
SP GR UR STRIP: 1.02 (ref 1–1.03)
SQUAMOUS EPITHELIAL: 1 /HPF
UROBILINOGEN UR STRIP-MCNC: <2 MG/DL
WBC # BLD AUTO: 5.7 10E3/UL (ref 4–11)
WBC URINE: 1 /HPF

## 2024-07-25 PROCEDURE — 84145 PROCALCITONIN (PCT): CPT | Performed by: STUDENT IN AN ORGANIZED HEALTH CARE EDUCATION/TRAINING PROGRAM

## 2024-07-25 PROCEDURE — 87640 STAPH A DNA AMP PROBE: CPT | Performed by: STUDENT IN AN ORGANIZED HEALTH CARE EDUCATION/TRAINING PROGRAM

## 2024-07-25 PROCEDURE — 99233 SBSQ HOSP IP/OBS HIGH 50: CPT | Performed by: STUDENT IN AN ORGANIZED HEALTH CARE EDUCATION/TRAINING PROGRAM

## 2024-07-25 PROCEDURE — 87040 BLOOD CULTURE FOR BACTERIA: CPT | Performed by: STUDENT IN AN ORGANIZED HEALTH CARE EDUCATION/TRAINING PROGRAM

## 2024-07-25 PROCEDURE — 80053 COMPREHEN METABOLIC PANEL: CPT | Performed by: STUDENT IN AN ORGANIZED HEALTH CARE EDUCATION/TRAINING PROGRAM

## 2024-07-25 PROCEDURE — 81001 URINALYSIS AUTO W/SCOPE: CPT | Performed by: STUDENT IN AN ORGANIZED HEALTH CARE EDUCATION/TRAINING PROGRAM

## 2024-07-25 PROCEDURE — 83735 ASSAY OF MAGNESIUM: CPT | Performed by: STUDENT IN AN ORGANIZED HEALTH CARE EDUCATION/TRAINING PROGRAM

## 2024-07-25 PROCEDURE — 250N000013 HC RX MED GY IP 250 OP 250 PS 637: Performed by: INTERNAL MEDICINE

## 2024-07-25 PROCEDURE — 85014 HEMATOCRIT: CPT | Performed by: STUDENT IN AN ORGANIZED HEALTH CARE EDUCATION/TRAINING PROGRAM

## 2024-07-25 PROCEDURE — 84100 ASSAY OF PHOSPHORUS: CPT | Performed by: STUDENT IN AN ORGANIZED HEALTH CARE EDUCATION/TRAINING PROGRAM

## 2024-07-25 PROCEDURE — 71045 X-RAY EXAM CHEST 1 VIEW: CPT

## 2024-07-25 PROCEDURE — 258N000003 HC RX IP 258 OP 636: Performed by: STUDENT IN AN ORGANIZED HEALTH CARE EDUCATION/TRAINING PROGRAM

## 2024-07-25 PROCEDURE — 250N000013 HC RX MED GY IP 250 OP 250 PS 637: Performed by: STUDENT IN AN ORGANIZED HEALTH CARE EDUCATION/TRAINING PROGRAM

## 2024-07-25 PROCEDURE — 250N000011 HC RX IP 250 OP 636: Performed by: STUDENT IN AN ORGANIZED HEALTH CARE EDUCATION/TRAINING PROGRAM

## 2024-07-25 PROCEDURE — 83605 ASSAY OF LACTIC ACID: CPT | Performed by: STUDENT IN AN ORGANIZED HEALTH CARE EDUCATION/TRAINING PROGRAM

## 2024-07-25 PROCEDURE — 36415 COLL VENOUS BLD VENIPUNCTURE: CPT | Performed by: STUDENT IN AN ORGANIZED HEALTH CARE EDUCATION/TRAINING PROGRAM

## 2024-07-25 PROCEDURE — 250N000011 HC RX IP 250 OP 636: Performed by: INTERNAL MEDICINE

## 2024-07-25 PROCEDURE — 200N000001 HC R&B ICU

## 2024-07-25 PROCEDURE — 87641 MR-STAPH DNA AMP PROBE: CPT | Performed by: STUDENT IN AN ORGANIZED HEALTH CARE EDUCATION/TRAINING PROGRAM

## 2024-07-25 RX ORDER — ACETAMINOPHEN 325 MG/1
650 TABLET ORAL EVERY 6 HOURS PRN
Status: DISCONTINUED | OUTPATIENT
Start: 2024-07-25 | End: 2024-07-28

## 2024-07-25 RX ORDER — ACETAMINOPHEN 650 MG/1
650 SUPPOSITORY RECTAL EVERY 6 HOURS PRN
Status: DISCONTINUED | OUTPATIENT
Start: 2024-07-25 | End: 2024-07-28

## 2024-07-25 RX ORDER — POTASSIUM CHLORIDE 1500 MG/1
20 TABLET, EXTENDED RELEASE ORAL ONCE
Status: COMPLETED | OUTPATIENT
Start: 2024-07-25 | End: 2024-07-25

## 2024-07-25 RX ORDER — MEROPENEM 1 G/1
1 INJECTION, POWDER, FOR SOLUTION INTRAVENOUS EVERY 8 HOURS
Status: DISCONTINUED | OUTPATIENT
Start: 2024-07-25 | End: 2024-08-01

## 2024-07-25 RX ORDER — MEROPENEM 1 G/1
1 INJECTION, POWDER, FOR SOLUTION INTRAVENOUS EVERY 8 HOURS
Status: DISCONTINUED | OUTPATIENT
Start: 2024-07-25 | End: 2024-07-25

## 2024-07-25 RX ORDER — SODIUM CHLORIDE, SODIUM LACTATE, POTASSIUM CHLORIDE, CALCIUM CHLORIDE 600; 310; 30; 20 MG/100ML; MG/100ML; MG/100ML; MG/100ML
INJECTION, SOLUTION INTRAVENOUS CONTINUOUS
Status: DISCONTINUED | OUTPATIENT
Start: 2024-07-25 | End: 2024-07-29

## 2024-07-25 RX ORDER — CLONIDINE HYDROCHLORIDE 0.1 MG/1
0.1 TABLET ORAL EVERY 8 HOURS
Status: DISCONTINUED | OUTPATIENT
Start: 2024-07-25 | End: 2024-07-26

## 2024-07-25 RX ORDER — CEFAZOLIN SODIUM 1 G/50ML
1750 SOLUTION INTRAVENOUS ONCE
Status: DISCONTINUED | OUTPATIENT
Start: 2024-07-25 | End: 2024-07-25

## 2024-07-25 RX ADMIN — LORAZEPAM 2 MG: 2 INJECTION INTRAMUSCULAR; INTRAVENOUS at 18:54

## 2024-07-25 RX ADMIN — FOLIC ACID 1 MG: 1 TABLET ORAL at 07:31

## 2024-07-25 RX ADMIN — CLONIDINE HYDROCHLORIDE 0.2 MG: 0.2 TABLET ORAL at 04:34

## 2024-07-25 RX ADMIN — PANTOPRAZOLE SODIUM 40 MG: 40 INJECTION, POWDER, FOR SOLUTION INTRAVENOUS at 20:44

## 2024-07-25 RX ADMIN — POTASSIUM CHLORIDE 20 MEQ: 1500 TABLET, EXTENDED RELEASE ORAL at 06:39

## 2024-07-25 RX ADMIN — HYDROMORPHONE HYDROCHLORIDE 0.2 MG: 0.2 INJECTION, SOLUTION INTRAMUSCULAR; INTRAVENOUS; SUBCUTANEOUS at 07:38

## 2024-07-25 RX ADMIN — HYDROMORPHONE HYDROCHLORIDE 0.4 MG: 0.2 INJECTION, SOLUTION INTRAMUSCULAR; INTRAVENOUS; SUBCUTANEOUS at 17:29

## 2024-07-25 RX ADMIN — SODIUM CHLORIDE 1750 MG: 9 INJECTION, SOLUTION INTRAVENOUS at 10:50

## 2024-07-25 RX ADMIN — PROCHLORPERAZINE EDISYLATE 5 MG: 5 INJECTION INTRAMUSCULAR; INTRAVENOUS at 10:26

## 2024-07-25 RX ADMIN — HYDROMORPHONE HYDROCHLORIDE 0.2 MG: 0.2 INJECTION, SOLUTION INTRAMUSCULAR; INTRAVENOUS; SUBCUTANEOUS at 10:32

## 2024-07-25 RX ADMIN — Medication 1 TABLET: at 07:31

## 2024-07-25 RX ADMIN — HYDROMORPHONE HYDROCHLORIDE 0.2 MG: 0.2 INJECTION, SOLUTION INTRAMUSCULAR; INTRAVENOUS; SUBCUTANEOUS at 03:04

## 2024-07-25 RX ADMIN — LORAZEPAM 2 MG: 2 INJECTION INTRAMUSCULAR; INTRAVENOUS at 14:48

## 2024-07-25 RX ADMIN — SODIUM CHLORIDE, SODIUM LACTATE, POTASSIUM CHLORIDE, CALCIUM CHLORIDE AND DEXTROSE MONOHYDRATE: 5; 600; 310; 30; 20 INJECTION, SOLUTION INTRAVENOUS at 01:16

## 2024-07-25 RX ADMIN — LORAZEPAM 2 MG: 2 INJECTION INTRAMUSCULAR; INTRAVENOUS at 18:24

## 2024-07-25 RX ADMIN — PANTOPRAZOLE SODIUM 40 MG: 40 INJECTION, POWDER, FOR SOLUTION INTRAVENOUS at 07:30

## 2024-07-25 RX ADMIN — THIAMINE HCL TAB 100 MG 100 MG: 100 TAB at 07:31

## 2024-07-25 RX ADMIN — LORAZEPAM 2 MG: 2 INJECTION INTRAMUSCULAR; INTRAVENOUS at 03:04

## 2024-07-25 RX ADMIN — LORAZEPAM 2 MG: 2 INJECTION INTRAMUSCULAR; INTRAVENOUS at 16:19

## 2024-07-25 RX ADMIN — HYDROMORPHONE HYDROCHLORIDE 0.4 MG: 0.2 INJECTION, SOLUTION INTRAMUSCULAR; INTRAVENOUS; SUBCUTANEOUS at 21:00

## 2024-07-25 RX ADMIN — MEROPENEM 1 G: 1 INJECTION, POWDER, FOR SOLUTION INTRAVENOUS at 09:53

## 2024-07-25 RX ADMIN — MEROPENEM 1 G: 1 INJECTION, POWDER, FOR SOLUTION INTRAVENOUS at 17:36

## 2024-07-25 RX ADMIN — SODIUM CHLORIDE, POTASSIUM CHLORIDE, SODIUM LACTATE AND CALCIUM CHLORIDE 1000 ML: 600; 310; 30; 20 INJECTION, SOLUTION INTRAVENOUS at 09:34

## 2024-07-25 RX ADMIN — SODIUM CHLORIDE, POTASSIUM CHLORIDE, SODIUM LACTATE AND CALCIUM CHLORIDE: 600; 310; 30; 20 INJECTION, SOLUTION INTRAVENOUS at 23:40

## 2024-07-25 RX ADMIN — ACETAMINOPHEN 650 MG: 325 TABLET ORAL at 09:35

## 2024-07-25 RX ADMIN — ACETAMINOPHEN 650 MG: 325 TABLET ORAL at 23:40

## 2024-07-25 RX ADMIN — HALOPERIDOL LACTATE 5 MG: 5 INJECTION, SOLUTION INTRAMUSCULAR at 19:47

## 2024-07-25 RX ADMIN — PROCHLORPERAZINE EDISYLATE 5 MG: 5 INJECTION INTRAMUSCULAR; INTRAVENOUS at 04:32

## 2024-07-25 RX ADMIN — HYDROMORPHONE HYDROCHLORIDE 0.4 MG: 0.2 INJECTION, SOLUTION INTRAMUSCULAR; INTRAVENOUS; SUBCUTANEOUS at 13:54

## 2024-07-25 RX ADMIN — SODIUM CHLORIDE, POTASSIUM CHLORIDE, SODIUM LACTATE AND CALCIUM CHLORIDE: 600; 310; 30; 20 INJECTION, SOLUTION INTRAVENOUS at 09:54

## 2024-07-25 RX ADMIN — SODIUM CHLORIDE, POTASSIUM CHLORIDE, SODIUM LACTATE AND CALCIUM CHLORIDE: 600; 310; 30; 20 INJECTION, SOLUTION INTRAVENOUS at 17:25

## 2024-07-25 ASSESSMENT — ACTIVITIES OF DAILY LIVING (ADL)
ADLS_ACUITY_SCORE: 30
ADLS_ACUITY_SCORE: 23
ADLS_ACUITY_SCORE: 23
ADLS_ACUITY_SCORE: 25
ADLS_ACUITY_SCORE: 24
ADLS_ACUITY_SCORE: 25
ADLS_ACUITY_SCORE: 25
ADLS_ACUITY_SCORE: 24
ADLS_ACUITY_SCORE: 24
ADLS_ACUITY_SCORE: 25
ADLS_ACUITY_SCORE: 25
ADLS_ACUITY_SCORE: 24
ADLS_ACUITY_SCORE: 25
ADLS_ACUITY_SCORE: 23
ADLS_ACUITY_SCORE: 25
ADLS_ACUITY_SCORE: 24
ADLS_ACUITY_SCORE: 25
ADLS_ACUITY_SCORE: 24

## 2024-07-25 NOTE — PLAN OF CARE
[facility] - ICU    RN Progress Note:            Pertinent Assessments:      Please refer to flowsheet rows for full assessment     Restless, oriented x 4, highest CIWA score 13, medicated with ativan prn per protocol. Sitter 1:1 for impulsiveness and agitation. Complained of abdominal pain, dilaudid prn given. Needing NJ reinsertion today due to necrotizing pancreatitis.            Key Events - This Shift:     See above notes.              Barriers to Discharge / Downgrade:   Can maybe downgrade now that BP is under control, cardine gtt off.        Problem: Alcohol Withdrawal  Goal: Alcohol Withdrawal Symptom Control  Outcome: Progressing     Problem: Comorbidity Management  Goal: Blood Pressure in Desired Range  Outcome: Progressing  Intervention: Maintain Blood Pressure Management  Recent Flowsheet Documentation  Taken 7/25/2024 0400 by Clyde Jackson RN  Medication Review/Management: medications reviewed  Taken 7/25/2024 0000 by Clyde Jackson RN  Medication Review/Management: medications reviewed     Problem: Pain Acute  Goal: Optimal Pain Control and Function  Outcome: Progressing  Intervention: Develop Pain Management Plan  Recent Flowsheet Documentation  Taken 7/24/2024 2329 by Clyde Jackson RN  Pain Management Interventions: medication (see MAR)  Intervention: Prevent or Manage Pain  Recent Flowsheet Documentation  Taken 7/25/2024 0400 by Clyde Jackson, RN  Medication Review/Management: medications reviewed  Taken 7/25/2024 0000 by Clyde Jackson RN  Medication Review/Management: medications reviewed

## 2024-07-25 NOTE — PLAN OF CARE
Goal Outcome Evaluation:       [facility] - ICU    RN Progress Note:Patient has been alert and oriented, VSS-occasionally tachycardic.  CIWA has been scoring a 3-5, dilaudid 0.2mg given for abdominal pain.  Patient has remained bedrest.  NJ has not been put back in, patient has refused.  We will start a clear liquid diet and try to advance per patient tolerance.              Pertinent Assessments:      Please refer to flowsheet rows for full assessment     VSS, currently put patient on 2L NC.  Abdominal pain, dilaudid given.  CIWA score between 3-5.             Key Events - This Shift:       Patient refused NJ placement, will try clear liquids and adat to low fat diet per GI (Dr. Story).  CIWA score 3-5.          JESUSN SAT (Sedation Awakening Trial): For use ONLY if intubated             Barriers to Discharge / Downgrade:     CIWA, possible need for frequent medications.  Will continue to assess.

## 2024-07-25 NOTE — PROGRESS NOTES
Type Of Assessment: Inpatient Substance Use Comprehensive Assessment    Referral Source:  Self  MRN: [id number]    DATE OF SERVICE: 2024  Date of previous EMILY Assessment: no  Patient confirmed identity through two factor verification: Full Legal Name, , and SSN    PATIENT'S NAME: Chandler Branch  Age: 44 year old  Last 4 SSN: [id number]  Sex: male   Gender Identity: male  Sexual Orientation: Heterosexual  Cultural Background: No, Denies any cultural influences or concerns that need to be considered for treatment  YOB: 1980  Current Address:   [address] [place], [place] [address]  Patient Phone Number:  [phone]   Patient's E-Mail Contact:  [email or web address]  Funding: [employer]   PMI:  [id number]  Emergency Contact: Brother Erwin [phone]  DAANES information was provided to patient and patient does not want a copy.     Telemedicine Visit: The patient's condition can be safely assessed and treated via synchronous audio and visual telemedicine encounter.    Reason for Telemedicine Visit: Patient unable to travel and Patient convenience (e.g. access to timely appointments / distance to available provider)  Originating Site (Patient Location): [facility] - [address] [place] [address]  Distant Site (Provider Location): [facility]: [facility]  Consent:  The patient/guardian has verbally consented to: the potential risks and benefits of telemedicine (video visit) versus in person care; bill my insurance or make self-payment for services provided; and responsibility for payment of non-covered services.   Mode of Communication:  Video Conference via  Phone    START TIME: Multiple days  END TIME: Multiple days 945 on     As the provider I attest to compliance with applicable laws and regulations related to telemedicine.   Chandler Branch was seen for a substance use disorder consult on 2024 by Ahsan Hernandez,  [facility] [place].    Reason for Substance Use Disorder Consult:   Per hospital H+P:  The patient is a 44-year-old male with a significant medical history of hypertension hyperlipidemia, gastroesophageal reflux disease, EtOH abuse and alcohol withdrawal syndrome who is admitted  due to alcohol related acute pancreatitis.       Are you currently having severe withdrawal symptoms that are putting yourself or others in danger? No  Are you currently having severe medical problems that require immediate attention? No, lower back problems, neck problems have had surgeries on neck and back.   Are you currently having severe emotional or behavioral problems that are putting yourself or others at risk of harm? No    Have you participated in prior substance use disorder evaluations? No   Have you ever been to detox, inpatient or outpatient treatment for substance related use? List previous treatment: No   Have you ever had a gambling problem or had treatment for compulsive gambling? No  Have you ever felt the need to bet more and more money? No  Have you ever had to lie to people important to you about how much you gambled? No    Patient does not appear to be in severe withdrawal, an imminent safety risk to self or others, or requiring immediate medical attention and may proceed with the assessment interview.  Comprehensive Substance Use History   X X = Primary Drug Used Age of First Use    Pattern of Substance Use   (heaviest use in life and a use history within the past year if applicable) (DSM-5: Sx #3) Date /  Quantity of last use if within the past 30 days Withdrawal Potential?   Method of use  (Oral, smoked, snorted, IV, etc)   X Alcohol   11 6 days a week, 3-4 drinks/day 7/23/24 NA Oral    Marijuana/Hashish   No use        Cocaine/Crack No use        Meth/Amphetamines   No use        Heroin   No use        Other Opiates/Synthetics   No use        Inhalants  No use        Benzodiazepines   No use        Hallucinogens   No use         Barbiturates/Sedatives/Hypnotics   No use        Over-the-Counter Drugs   No use        Other   No use        Nicotine   No use         Withdrawal symptoms: Have you had any of the following withdrawal symptoms?  Shaking, feeling unmotivated.     Have you experienced any cravings?  Yes    Have you had periods of abstinence?  Yes   What was your longest period? 3 days    Any circumstances that lead to relapse? NA    What activities have you engaged in when using alcohol/other drugs that could be hazardous to you or others?  no    A description of any risk-taking behavior, including behavior that puts the client at risk of exposure to blood-borne or sexually transmitted diseases: no    Arrests and legal interventions related to substance use: Denies any current probation or history of legal charges.     A description of how the patient's use affected their ability to function appropriately in a work setting: no    A description of how the patient's use affected their ability to function appropriately in an educational setting: no    Leisure time activities that are associated with substance use: sporting events, social activities.     Do you think your substance use has become a problem for you? yes    MEDICAL HISTORY  Physical or medical concerns or diagnoses:   Patient Active Problem List   Diagnosis    Cervical radiculopathy    Accelerated hypertension    Hypokalemia    Hyponatremia    Acute kidney injury (H24)    Alcohol withdrawal syndrome with complication (H)    Alcohol-induced acute pancreatitis without infection or necrosis    Hypomagnesemia    Alcoholic hepatitis without ascites (H28)         Do you have any current medical treatment needs not being addressed by inpatient treatment?  no    Do you need a referral for a medical provider? Tana Dougherty [facility]    Current medications:   Current Outpatient Medications   Medication Instructions    acetaminophen (TYLENOL) 500-1,000 mg, Oral,  EVERY 6 HOURS PRN    lisinopril-hydrochlorothiazide (ZESTORETIC) 10-12.5 MG tablet 1 tablet, Oral, DAILY    omeprazole (PRILOSEC) 20 mg, Oral, DAILY    traZODone (DESYREL) 50 mg, Oral, AT BEDTIME PRN           Are you pregnant? NA, Male    Do you have any specific physical needs/accommodations? No    MENTAL HEALTH HISTORY:  Have you ever had  hospitalizations or treatment for mental health illness: No    Mental health history, including diagnosis and symptoms, and the effect on the client's ability to function: none    Current mental health treatment including psychotropic medication needed to maintain stability: (Note: The assessment must utilize screening tools approved by the commissioner pursuant to section 245.4863 to identify whether the client screens positive for co-occurring disorders): no    Have you ever been verbally, emotionally, physically or sexually abused?   Denies    Family history of substance use and misuse: mother and runs on mom's side.     The patient's desire for family involvement in the treatment program: no  Level of family support: supportive.     Social network in relation to expected support for recovery: gone to 2 meetings in the past.     Are you currently in a significant relationship? Yes.  4B. How long? 2 years    Please describe your significant other's use of mood altering chemicals? Social drinker    Do you have any children (include living arrangements/custody/contact)?:  3 children, 15, 12, 12. Live with patient.     What is your current living situation? Own a home, lives with kids.     Are you employed/attending school? Full time for [employer], [employer], [employer].     SUMMARY:  Ability to understand written treatment materials: I feel like I have dyslexia but never diagnosed.   Ability to understand patient rules and patient rights: Yes  Does the patient recognize needs related to substance use and is willing to follow treatment  recommendations: Yes  Does the patient have an opioid use disorder:  does not have a history of opiate use.    ASAM Dimension Scale Ratings:    Dimension 1 -  Acute Intoxication/Withdrawal: 0 - No Problem  Dimension 2 - Biomedical: 1 - Minor Problem  Dimension 3 - Emotional/Behavioral/Cognitive Conditions: 2 - Moderate Problem  Dimension 4 - Readiness to Change:  3 - Severe Problem  Dimension 5 - Relapse/Continued Use/ Continued Problem Potential: 4 - Extreme Problem  Dimension 6 - Recovery Environment:  2 - Moderate Problem    Category of Substance Severity (ICD-10 Code / DSM 5 Code)     Alcohol Use Disorder Severe (F10.20) (303.90)   Cannabis Use Disorder The patient does not meet the criteria for a Cannabis use disorder.   Hallucinogen Use Disorder The patient does not meet the criteria for a Hallucinogen use disorder.   Inhalant Use Disorder The patient does not meet the criteria for an Inhalant use disorder.   Opioid Use Disorder The patient does not meet the criteria for an Opioid use disorder.   Sedative, Hypnotic, or Anxiolytic Use Disorder The patient does not meet the criteria for a Sedative/Hypnotic use disorder.   Stimulant Related Disorder The patient does not meet the criteria for a Stimulant use disorder.   Tobacco Use Disorder The patient does not meet the criteria for a Tobacco use disorder.   Other (or unknown) Substance Use Disorder The patient does not meet the criteria for a Other (or unknown) Substance use disorder.     A problematic pattern of alcohol/drug use leading to clinically significant impairment or distress, as manifested by at least two of the following, occurring within a 12-month period:    1.) Alcohol/drug is often taken in larger amounts or over a longer period than was intended.  2.) There is a persistent desire or unsuccessful efforts to cut down or control alcohol/drug use  3.) A great deal of time is spent in activities necessary to obtain alcohol, use alcohol, or recover  from its effects.  4.) Craving, or a strong desire or urge to use alcohol/drug  9.) Alcohol/drug use is continued despite knowledge of having a persistent or recurrent physical or psychological problem that is likely to have been caused or exacerbated by alcohol.  10.) Tolerance, as defined by either of the following: A need for markedly increased amounts of alcohol/drug to achieve intoxication or desired effect.  11.) Withdrawal, as manifested by either of the following: The characteristic withdrawal syndrome for alcohol/drug (refer to Criteria A and B of the criteria set for alcohol/drug withdrawal).    Specify if: In early remission:  After full criteria for alcohol/drug use disorder were previously met, none of the criteria for alcohol/drug use disorder have been met for at least 3 months but for less than 12 months (with the exception that Criterion A4,  Craving or a strong desire or urge to use alcohol/drug  may be met).     In sustained remission:   After full criteria for alcohol use disorder were previously met, non of the criteria for alcohol/drug use disorder have been met at any time during a period of 12 months or longer (with the exception that Criterion A4,  Craving or strong desire or urge to use alcohol/drug  may be met).     Specify if:   This additional specifier is used if the individual is in an environment where access to alcohol is restricted.    Mild: Presence of 2-3 symptoms  Moderate: Presence of 4-5 symptoms  Severe: Presence of 6 or more symptoms    Collateral information: EMILY Collateral Info: Sufficient information is obtained from the patient to support diagnosis and recommendations. Contact with a collateral sources is not required.    Recommendations: Intensive Outpatient Treatment at [facility].     Clinical Substantiation:  Patient is a 44 year old male and single father with 3 kids.  Patient denies any legal history.  Patient states he has chronic neck and back problems due  to being in the .  Patient is currently hospitalized due to necrotizing pancreatitis.  Patient states he doesn't attend any sober support network.  Patient works full time at the [employer] and is in the [employer].     Referrals/ Alternatives:  [facility] [address]  [address]  [place], [place], [address]  [phone]    [facility],  [phone]        EMILY consult completed by: Ahsan Hernandez [facility].  Phone Number: NA  E-mail Address: [email or web address] Evaluation Department  [address] [place] [address]     *Due to regulation of Title 42 of the Code of Federal Regulations (CFR) Part 2: Confidentiality laws apply to this note and the information wherein.  Thus, this note cannot be copy and pasted into any other health care staff's note nor can it be included in general medical records sent to ANY outside agency without the patient's written consent.

## 2024-07-25 NOTE — PROGRESS NOTES
[facility] progress Note    Subjective: Patient still complains of pain, feels about the same.  Intermittent nausea and vomiting (brown emesis).    NJ tube had been placed and shortly after that the patient vomited and retched it out, so no longer in place.  I discussed with the patient the importance of reinserting this, he does not think he wants to do that.    Objective:  Vital signs in last 24 hours  Temp:  [98  F (36.7  C)-101.1  F (38.4  C)] 101.1  F (38.4  C)  Pulse:  [] 96  Resp:  [10-44] 23  BP: ()/() 96/61  SpO2:  [92 %-96 %] 92 %     Gen: Awake, no acute distress  Pulmonary: Lungs clear to ausculation bilaterally  Cardiovascular: Regular  Gastrointestinal: Positive bowel sounds, soft, diffusely tender especially in the upper abdomen, non-distended, no rebound or guarding    Assessment: 1.  Severe, early necrotizing pancreatitis.  Likely secondary to alcohol.  Continues on lactated Ringer's at 150 cc/h.  Enteral feeding attempted, patient did not tolerate this.  He is not sure he wants to try this again.  He was encouraged to do so, but I suspect he will not.  Now, he has some bowel sounds so potentially we might be able to try oral feeding.  Data more recently over the years suggest that early oral feeding often can be tolerated and is safe.  He could have ice chips and as much water as he wants (as long as something like an ileus, abdominal fullness or nausea and vomiting worsening due to that does not limit).  Discussed with nurse that he could have ice chips, water and we could try limited low-fat diet later perhaps.  If there is worsening of pain and/or nausea, then we need to hold off on diet.  Patient with fever.  Often this can be related to significant pancreatitis, necrosis.  Typically this would be too early for pancreatic infection.  Currently, no definite need for antibiotics from a GI perspective.  However, if fever recurring, then we may need to started  empirically.    Hematocrit now less than 44.  White blood cell count remains normal.  Lactate is improving but still abnormal.  Bicarb is better.  Calcium has decreased from the pancreatitis.    2.  Alcohol withdrawal-still on CIWA protocol    Plan: Continue with lactated Ringer's at 150 cc/h today.  Encourage NJ tube feeding, otherwise plan on water, ice chips and later today potentially limited low-fat diet    Patient Active Problem List   Diagnosis    Cervical radiculopathy    Accelerated hypertension    Hypokalemia    Hyponatremia    Acute kidney injury (H24)    Alcohol withdrawal syndrome with complication (H)    Alcohol-induced acute pancreatitis without infection or necrosis    Hypomagnesemia    Alcoholic hepatitis without ascites (H28)       Labs:  CMP Results:   Recent Labs   Lab Test 07/25/24 0726 07/25/24 0453   NA  --  135   POTASSIUM  --  3.6   CHLORIDE  --  100   CO2  --  25   ANIONGAP  --  10   * 179*   BUN  --  11.7   CR  --  0.71   BILITOTAL  --  1.1   ALKPHOS  --  58   ALT  --  90*   AST  --  84*      CBC  Recent Labs   Lab 07/25/24 0453 07/24/24  0801 07/24/24  0003   WBC 5.7 11.5* 7.9   RBC 4.45 4.42 4.89   HGB 14.2 14.2 15.8   HCT 42.0 41.7 45.3   MCV 94 94 93   MCH 31.9 32.1 32.3   MCHC 33.8 34.1 34.9   RDW 12.9 12.7 12.4   * 141* 172     INRNo lab results found in last 7 days.   Lipase   Date Value Ref Range Status   07/24/2024 1,425 (H) 13 - 60 U/L Final   07/24/2024 1,207 (H) 13 - 60 U/L Final   04/10/2024 117 (H) 13 - 60 U/L Final   02/23/2022 16 0 - 52 U/L Final   10/05/2021 25 0 - 52 U/L Final     Recent Labs   Lab 07/25/24 0453 07/24/24  0801 07/24/24  0003   AST 84* 200* 274*   ALT 90* 163* 203*   ALKPHOS 58 118 138   BILITOTAL 1.1 0.6 0.5   LIPASE  --  1,425* 1,207*       Imaging: XR Nasal Gastric Tube Placement    Result Date: 7/24/2024  EXAM: XR NASAL GASTRIC TUBE PLACEMENT LOCATION: [facility] DATE: 7/24/2024 INDICATION: Need NJ tube for  severe pancreatitis COMPARISON: CT 7/24/2024 TECHNIQUE: Intraoperative fluoroscopy performed during the patient's procedure. RADIATION DOSE: Total Air Kerma 9.9 mGy FINDINGS: Utilizing fluoroscopic guidance, an enteric feeding tube was placed with the tip located in the distal gastric antrum. Despite multiple attempts, the tip could not be advanced beyond the pylorus.    CT Abdomen Pelvis w Contrast    Result Date: 7/24/2024  EXAM: CT ABDOMEN PELVIS W CONTRAST LOCATION: [facility] DATE: 7/24/2024 INDICATION: acute pancreatitis COMPARISON: None. TECHNIQUE: CT scan of the abdomen and pelvis was performed following injection of IV contrast. Multiplanar reformats were obtained. Dose reduction techniques were used. CONTRAST: IsoVue 370 90mL FINDINGS: LOWER CHEST: Basilar atelectasis. HEPATOBILIARY: Hepatic steatosis. PANCREAS: Peripancreatic inflammation and adjacent fluid consistent with acute pancreatitis. Prominent area of hypoenhancement pancreatic head and uncinate process as 3 image 1074.3 x 2.9 cm in size. SPLEEN: Normal. ADRENAL GLANDS: Normal. KIDNEYS/BLADDER: Normal. BOWEL: Duodenal wall thickening and wall thickening hepatic flexure of colon presumably secondary to pancreatic inflammation. Bowel is normal caliber. LYMPH NODES: Borderline left retroperitoneal lymph node. VASCULATURE: Atherosclerotic vascular calcification PELVIC ORGANS: Small amount of pelvic free fluid. Postsurgical change scrotum. MUSCULOSKELETAL: Degenerative change osseous structures.     IMPRESSION: 1.  Acute pancreatitis. Prominent hypoenhancement pancreatic head and uncinate process could be due to area of necrotizing pancreatitis. Follow-up to confirm resolution is necessary to exclude underlying mass. 2.  Wall thickening duodenum and hepatic flexure of colon presumably secondary to pancreatic inflammation. 3.  Borderline left retroperitoneal lymph nodes.      The total time spent with this patient was 25  minutes.                                                Heriberto Soto MD  Thank you for the opportunity to participate in the care of this patient.   Please feel free to call me with any questions or concerns.  Phone number [phone].

## 2024-07-25 NOTE — PROGRESS NOTES
[facility]    Medicine Progress Note - Hospitalist Service    Date of Admission:  7/23/2024    Assessment & Plan   44-year-old male with a significant medical history of hypertension hyperlipidemia, gastroesophageal reflux disease, EtOH abuse and alcohol withdrawal syndrome who is admitted  due to alcohol related acute pancreatitis     Acute necrotizing pancreatitis likely alcohol induced  -Tachycardia, leukocytosis, lactic acidosis- improving - likely SIRS response to pancreatitis  -CT abdomen pelvis shows acute pancreatitis.  Findings suggestive of necrotizing pancreatitis.  Borderline left retroperitoneal lymph nodes  -Patient spiked fever 101.1F 07/25  -Infectious work up UA, CXR negative, Blood culture sent  -Received one dose of vancomycin and Meropenem, will hold off further antibiotics  -Resume antibiotics if recurrent fever or worsening leukocytosis  -IV Fluids 150cc/hr  -Seen by GI, appreciate recommendations  -Refused placing NJ tube, started on CLD and ADAT to low fat diet as per GI  -Antiemetics as needed  -Pain management with Dilaudid    Addendum: Patient continues to have recurrent fevers, will start Meropenem    HTN  Hypertensive urgency likely 2/2 necrotizing pancreatitis and suspect alcohol withdrawal - resolved  -Blood pressure controlled  -Suspect Ativan and Dilaudid is lowering the blood pressure  -Off Nicardipine drip, held Clonidine and Lisinopril-hydrochlorothiazide    EtOH abuse  Transaminitis - improving  Has been drinking vodka daily, last drink 07/23/24  Likely to withdraw from 07/25, denies history of alcohol withdrawal seizures or DTs  blood alcohol level high on presentation  Transaminitis improving, T.bili normal  Patient placed on CIWA protocol - Ativan  thiamine, folate, MVI  Trend LFT's  chemical dependency consult    High Anion gap metabolic acidosis - resolved  Lactic acidosis  - improving  Suspect 2/2 hypovolemia due to pancreatitis  On IV  fluids  Monitor AG and lactic acid    Hypokalemia  Hypomagnesemia  Hypophosphatemia  Monitor and replete as needed    Hypocalcemia  2/2 pancreatitis  Asymptomatic, will monitor    Mild thrombocytopenia  2/2 alcohol use vs dilutional  Monitor Platelets    GERD  Continue PPI    HLD  Resume statin when LFTs are improving                Diet: Adult Formula Drip Feeding: Continuous Vital 1.5; Nasojejunal; Goal Rate: 60; mL/hr; initiate at 20 mL/hr and advance by 10 mL/hr every 8 hrs to goal rate  Clear Liquid Diet (ok to adat to low fat low salt diet)    DVT Prophylaxis: Pneumatic Compression Devices  Mancia Catheter: Not present  Lines: None     Cardiac Monitoring: ACTIVE order. Indication: ICU  Code Status: Full Code      Clinically Significant Risk Factors        # Hypokalemia: Lowest K = 3.2 mmol/L in last 2 days, will replace as needed   # Hypocalcemia: Lowest Ca = 8.1 mg/dL in last 2 days, will monitor and replace as appropriate   # Hypomagnesemia: Lowest Mg = 1.4 mg/dL in last 2 days, will replace as needed  # Anion Gap Metabolic Acidosis: Highest Anion Gap = 21 mmol/L in last 2 days, will monitor and treat as appropriate  # Hypoalbuminemia: Lowest albumin = 3.4 g/dL at 7/25/2024  4:53 AM, will monitor as appropriate   # Thrombocytopenia: Lowest platelets = 104 in last 2 days, will monitor for bleeding   # Hypertension: Noted on problem list            # Overweight: Estimated body mass index is 26.37 kg/m  as calculated from the following:    Height as of this encounter: 1.829 m (6').    Weight as of this encounter: 88.2 kg (194 lb 6.4 oz)., PRESENT ON ADMISSION     # Financial/Environmental Concerns: none         Disposition Plan     Medically Ready for Discharge: Anticipated in 2-4 Days             Petrona Reeder MD  Hospitalist Service  [facility]  Securely message with [employer] (more info)  Text page via [employer] Paging/Directory    ______________________________________________________________________    Interval History   Patient was examined at the bedside. Significant other Kassie present.  Discussed ongoing clinical course and answered all questions.  Spiked one episode of fever 101.1F, continue to monitor    Physical Exam   Vital Signs: Temp: 99.6  F (37.6  C) Temp src: Oral BP: 102/59 Pulse: 88   Resp: 19 SpO2: 93 % O2 Device: None (Room air)    Weight: 194 lbs 6.4 oz    General Aox3, minimal distress due to pain  Lungs: CTAB  Heart RRR, No M/R/G  Abd- Soft,diffusely tender, BS decreased  Extremity- Moving all extremities, no edema  Neuro- Aox3    Medical Decision Making       65 MINUTES SPENT BY ME on the date of service doing chart review, history, exam, documentation & further activities per the note.      Data     I have personally reviewed the following data over the past 24 hrs:    5.7  \   14.2   / 104 (L)     135 100 11.7 /  188 (H)   3.6 25 0.71 \     ALT: 90 (H) AST: 84 (H) AP: 58 TBILI: 1.1   ALB: 3.4 (L) TOT PROTEIN: 6.1 (L) LIPASE: N/A     Procal: 0.69 (H) CRP: N/A Lactic Acid: 2.8 (H)         Imaging results reviewed over the past 24 hrs:   Recent Results (from the past 24 hour(s))   XR Nasal Gastric Tube Placement    Narrative    EXAM: XR NASAL GASTRIC TUBE PLACEMENT  LOCATION: [facility]  DATE: 7/24/2024    INDICATION: Need NJ tube for severe pancreatitis  COMPARISON: CT 7/24/2024    TECHNIQUE: Intraoperative fluoroscopy performed during the patient's procedure.    RADIATION DOSE: Total Air Kerma 9.9 mGy    FINDINGS: Utilizing fluoroscopic guidance, an enteric feeding tube was placed with the tip located in the distal gastric antrum. Despite multiple attempts, the tip could not be advanced beyond the pylorus.   XR Chest Port 1 View    Narrative    EXAM: XR CHEST PORT 1 VIEW  LOCATION: [facility]  DATE: 7/25/2024    INDICATION: Emesis. Assess for pneumonia.  COMPARISON:  2/23/2022      Impression    IMPRESSION: Heart size magnified in AP projection with normal vascularity. Shallow inspiration accentuates bibasilar subsegmental atelectasis. No pneumothorax nor pleural effusion.

## 2024-07-25 NOTE — PROGRESS NOTES
Attempted to talk to patient again.  Informed by staff that patient just received pain medications and is sleeping.  Will try again tomorrow.   AMELIA Redd on 7/25/2024 at 2:04 PM        Met with patient to discuss possible EMILY treatment options and to possibly complete an assessment. Started the assessment, did get most of the way through the assessment.  Throughout the assessment patient appeared to greatly struggle to answer questions.  Eventually stopped the assessment when patient was unable to understand the questions being asked. Will try again later today or tomorrow to finish the assessment and discuss referrals.     AMELIA Redd on 7/25/2024 at 9:47 AM

## 2024-07-25 NOTE — PLAN OF CARE
Goal Outcome Evaluation:      Plan of Care Reviewed With: significant other    Overall Patient Progress: no change    Outcome Evaluation: Pt is currently withdrawing from alcohol and has had lots of anxiety nausea and vomiting.    [facility] - ICU    RN Progress Note:            Pertinent Assessments:      Please refer to flowsheet rows for full assessment     CIWA > 12 PRN lorazepam given see MAR.             Key Events - This Shift:       Pt lost NG tube access after vomiting shortly after arriving to ICU.  PRN lorazepam given per CIWA see flowsheets and MAR.  1:1 sitter initiated for patient safety. PT maintained NPO due to severe nausea.  Cardene drip off since 1700.                   Barriers to Discharge / Downgrade:     ETOH withdrawl         Point of Contact Update YES-OR-NO: Yes  Name:Kassie  Phone Number:Updated at bedside  Summary of Conversation: Pt undergoing withdrawal from alcohol as well as having acute pancreatitis.  Pt is unable to eat due to his nausea and vomiting and because eating will irritate the pancreas even further.  Pt will likely have feeding tube replaced tomorrow.

## 2024-07-25 NOTE — PHARMACY-VANCOMYCIN DOSING SERVICE
Pharmacy Vancomycin Initial Note  Date of Service 2024  Patient's  1980  44 year old, male    Indication: Sepsis    Current estimated CrCl = Estimated Creatinine Clearance: 163.9 mL/min (based on SCr of 0.71 mg/dL).    Creatinine for last 3 days  2024: 12:03 AM Creatinine 0.76 mg/dL;  8:01 AM Creatinine 0.70 mg/dL  2024:  4:53 AM Creatinine 0.71 mg/dL    Recent Vancomycin Level(s) for last 3 days  No results found for requested labs within last 3 days.      Vancomycin IV Administrations (past 72 hours)        No vancomycin orders with administrations in past 72 hours.                    Nephrotoxins and other renal medications (From now, onward)      Start     Dose/Rate Route Frequency Ordered Stop    24 0800  [Held by provider]  lisinopril-hydrochlorothiazide (ZESTORETIC) 10-12.5 MG per tablet 1 tablet        (On hold since today at 0758 until manually unheld; held by Petrona Reeder MDHold Reason: Other)   Note to Pharmacy: PTA Sig:Take 1 tablet by mouth daily      1 tablet Oral or Feeding Tube DAILY 24 1741              Contrast Orders - past 72 hours (72h ago, onward)      Start     Dose/Rate Route Frequency Stop    24 1000  iopamidol (ISOVUE-370) solution 90 mL         90 mL Intravenous ONCE 24 0945            [employer] Prediction of Planned Initial Vancomycin Regimen  Loading dose: 1750 mg at 10:30 2024.  Regimen: 1500 mg IV every 12 hours.  Start time: 22:30 on 2024  Exposure target: AUC24 (range)400-600 mg/L.hr   AUC24,ss: 553 mg/L.hr  Probability of AUC24 > 400: 80 %  Ctrough,ss: 16.4 mg/L  Probability of Ctrough,ss > 20: 35 %  Probability of nephrotoxicity (Lodise ADITYA ): 12 %        Plan:  Start vancomycin 1750 mg IV Once, followed by 1500 mg IV q12h.   Vancomycin monitoring method: AUC  Vancomycin therapeutic monitoring goal: 400-600 mg*h/L  Pharmacy will check vancomycin levels as appropriate in 1-3 Days.    Serum creatinine levels will  be ordered daily for the first week of therapy and at least twice weekly for subsequent weeks.      Lara Hogan, RPH

## 2024-07-26 ENCOUNTER — APPOINTMENT (OUTPATIENT)
Dept: CT IMAGING | Facility: HOSPITAL | Age: 44
End: 2024-07-26
Attending: STUDENT IN AN ORGANIZED HEALTH CARE EDUCATION/TRAINING PROGRAM
Payer: COMMERCIAL

## 2024-07-26 LAB
ANION GAP SERPL CALCULATED.3IONS-SCNC: 6 MMOL/L (ref 7–15)
BUN SERPL-MCNC: 16.8 MG/DL (ref 6–20)
CA-I BLD-MCNC: 4.4 MG/DL (ref 4.4–5.2)
CALCIUM SERPL-MCNC: 7.6 MG/DL (ref 8.8–10.4)
CHLORIDE SERPL-SCNC: 100 MMOL/L (ref 98–107)
CREAT SERPL-MCNC: 0.85 MG/DL (ref 0.67–1.17)
D DIMER PPP FEU-MCNC: 10.09 UG/ML FEU (ref 0–0.5)
EGFRCR SERPLBLD CKD-EPI 2021: >90 ML/MIN/1.73M2
ERYTHROCYTE [DISTWIDTH] IN BLOOD BY AUTOMATED COUNT: 12.8 % (ref 10–15)
GLUCOSE BLDC GLUCOMTR-MCNC: 113 MG/DL (ref 70–99)
GLUCOSE SERPL-MCNC: 109 MG/DL (ref 70–99)
HCO3 SERPL-SCNC: 27 MMOL/L (ref 22–29)
HCT VFR BLD AUTO: 37 % (ref 40–53)
HGB BLD-MCNC: 12.2 G/DL (ref 13.3–17.7)
LACTATE SERPL-SCNC: 1.3 MMOL/L (ref 0.7–2)
MAGNESIUM SERPL-MCNC: 1.9 MG/DL (ref 1.7–2.3)
MCH RBC QN AUTO: 31.9 PG (ref 26.5–33)
MCHC RBC AUTO-ENTMCNC: 33 G/DL (ref 31.5–36.5)
MCV RBC AUTO: 97 FL (ref 78–100)
PHOSPHATE SERPL-MCNC: 1.9 MG/DL (ref 2.5–4.5)
PHOSPHATE SERPL-MCNC: 2 MG/DL (ref 2.5–4.5)
PLATELET # BLD AUTO: 93 10E3/UL (ref 150–450)
POTASSIUM SERPL-SCNC: 3.6 MMOL/L (ref 3.4–5.3)
RBC # BLD AUTO: 3.82 10E6/UL (ref 4.4–5.9)
SODIUM SERPL-SCNC: 133 MMOL/L (ref 135–145)
WBC # BLD AUTO: 7.1 10E3/UL (ref 4–11)

## 2024-07-26 PROCEDURE — 200N000001 HC R&B ICU

## 2024-07-26 PROCEDURE — 250N000009 HC RX 250: Performed by: STUDENT IN AN ORGANIZED HEALTH CARE EDUCATION/TRAINING PROGRAM

## 2024-07-26 PROCEDURE — 250N000011 HC RX IP 250 OP 636: Performed by: STUDENT IN AN ORGANIZED HEALTH CARE EDUCATION/TRAINING PROGRAM

## 2024-07-26 PROCEDURE — 85379 FIBRIN DEGRADATION QUANT: CPT | Performed by: STUDENT IN AN ORGANIZED HEALTH CARE EDUCATION/TRAINING PROGRAM

## 2024-07-26 PROCEDURE — 82330 ASSAY OF CALCIUM: CPT | Performed by: STUDENT IN AN ORGANIZED HEALTH CARE EDUCATION/TRAINING PROGRAM

## 2024-07-26 PROCEDURE — 250N000011 HC RX IP 250 OP 636: Performed by: INTERNAL MEDICINE

## 2024-07-26 PROCEDURE — 83605 ASSAY OF LACTIC ACID: CPT | Performed by: STUDENT IN AN ORGANIZED HEALTH CARE EDUCATION/TRAINING PROGRAM

## 2024-07-26 PROCEDURE — 250N000013 HC RX MED GY IP 250 OP 250 PS 637: Performed by: STUDENT IN AN ORGANIZED HEALTH CARE EDUCATION/TRAINING PROGRAM

## 2024-07-26 PROCEDURE — 71275 CT ANGIOGRAPHY CHEST: CPT

## 2024-07-26 PROCEDURE — 99233 SBSQ HOSP IP/OBS HIGH 50: CPT | Performed by: STUDENT IN AN ORGANIZED HEALTH CARE EDUCATION/TRAINING PROGRAM

## 2024-07-26 PROCEDURE — 85027 COMPLETE CBC AUTOMATED: CPT | Performed by: STUDENT IN AN ORGANIZED HEALTH CARE EDUCATION/TRAINING PROGRAM

## 2024-07-26 PROCEDURE — 36415 COLL VENOUS BLD VENIPUNCTURE: CPT | Performed by: STUDENT IN AN ORGANIZED HEALTH CARE EDUCATION/TRAINING PROGRAM

## 2024-07-26 PROCEDURE — 84100 ASSAY OF PHOSPHORUS: CPT | Performed by: STUDENT IN AN ORGANIZED HEALTH CARE EDUCATION/TRAINING PROGRAM

## 2024-07-26 PROCEDURE — 80048 BASIC METABOLIC PNL TOTAL CA: CPT | Performed by: STUDENT IN AN ORGANIZED HEALTH CARE EDUCATION/TRAINING PROGRAM

## 2024-07-26 PROCEDURE — 250N000013 HC RX MED GY IP 250 OP 250 PS 637: Performed by: INTERNAL MEDICINE

## 2024-07-26 PROCEDURE — 258N000003 HC RX IP 258 OP 636: Performed by: STUDENT IN AN ORGANIZED HEALTH CARE EDUCATION/TRAINING PROGRAM

## 2024-07-26 PROCEDURE — 99418 PROLNG IP/OBS E/M EA 15 MIN: CPT | Performed by: STUDENT IN AN ORGANIZED HEALTH CARE EDUCATION/TRAINING PROGRAM

## 2024-07-26 PROCEDURE — 258N000003 HC RX IP 258 OP 636: Performed by: INTERNAL MEDICINE

## 2024-07-26 PROCEDURE — 83735 ASSAY OF MAGNESIUM: CPT | Performed by: INTERNAL MEDICINE

## 2024-07-26 RX ORDER — IOPAMIDOL 755 MG/ML
90 INJECTION, SOLUTION INTRAVASCULAR ONCE
Status: COMPLETED | OUTPATIENT
Start: 2024-07-26 | End: 2024-07-26

## 2024-07-26 RX ORDER — HYDROMORPHONE HYDROCHLORIDE 1 MG/ML
0.4 INJECTION, SOLUTION INTRAMUSCULAR; INTRAVENOUS; SUBCUTANEOUS
Status: DISCONTINUED | OUTPATIENT
Start: 2024-07-26 | End: 2024-07-28

## 2024-07-26 RX ORDER — POTASSIUM CHLORIDE 1500 MG/1
20 TABLET, EXTENDED RELEASE ORAL ONCE
Status: COMPLETED | OUTPATIENT
Start: 2024-07-26 | End: 2024-07-26

## 2024-07-26 RX ORDER — HYDROMORPHONE HYDROCHLORIDE 1 MG/ML
0.4 INJECTION, SOLUTION INTRAMUSCULAR; INTRAVENOUS; SUBCUTANEOUS
Status: DISCONTINUED | OUTPATIENT
Start: 2024-07-26 | End: 2024-07-26

## 2024-07-26 RX ORDER — MAGNESIUM OXIDE 400 MG/1
400 TABLET ORAL EVERY 4 HOURS
Status: COMPLETED | OUTPATIENT
Start: 2024-07-26 | End: 2024-07-26

## 2024-07-26 RX ORDER — HYDROMORPHONE HYDROCHLORIDE 1 MG/ML
0.2 INJECTION, SOLUTION INTRAMUSCULAR; INTRAVENOUS; SUBCUTANEOUS
Status: DISCONTINUED | OUTPATIENT
Start: 2024-07-26 | End: 2024-07-28

## 2024-07-26 RX ORDER — PANTOPRAZOLE SODIUM 40 MG/1
40 TABLET, DELAYED RELEASE ORAL
Status: DISCONTINUED | OUTPATIENT
Start: 2024-07-26 | End: 2024-07-28

## 2024-07-26 RX ORDER — ENOXAPARIN SODIUM 100 MG/ML
40 INJECTION SUBCUTANEOUS EVERY 24 HOURS
Status: DISCONTINUED | OUTPATIENT
Start: 2024-07-26 | End: 2024-08-01 | Stop reason: HOSPADM

## 2024-07-26 RX ADMIN — MAGNESIUM OXIDE TAB 400 MG (241.3 MG ELEMENTAL MG) 400 MG: 400 (241.3 MG) TAB at 10:09

## 2024-07-26 RX ADMIN — LORAZEPAM 2 MG: 2 INJECTION INTRAMUSCULAR; INTRAVENOUS at 23:14

## 2024-07-26 RX ADMIN — LORAZEPAM 2 MG: 2 INJECTION INTRAMUSCULAR; INTRAVENOUS at 08:02

## 2024-07-26 RX ADMIN — SODIUM CHLORIDE, POTASSIUM CHLORIDE, SODIUM LACTATE AND CALCIUM CHLORIDE: 600; 310; 30; 20 INJECTION, SOLUTION INTRAVENOUS at 21:55

## 2024-07-26 RX ADMIN — LORAZEPAM 2 MG: 2 INJECTION INTRAMUSCULAR; INTRAVENOUS at 21:37

## 2024-07-26 RX ADMIN — HALOPERIDOL LACTATE 5 MG: 5 INJECTION, SOLUTION INTRAMUSCULAR at 21:50

## 2024-07-26 RX ADMIN — ONDANSETRON 4 MG: 2 INJECTION INTRAMUSCULAR; INTRAVENOUS at 14:26

## 2024-07-26 RX ADMIN — MEROPENEM 1 G: 1 INJECTION, POWDER, FOR SOLUTION INTRAVENOUS at 10:09

## 2024-07-26 RX ADMIN — POTASSIUM CHLORIDE 20 MEQ: 1500 TABLET, EXTENDED RELEASE ORAL at 07:46

## 2024-07-26 RX ADMIN — MAGNESIUM OXIDE TAB 400 MG (241.3 MG ELEMENTAL MG) 400 MG: 400 (241.3 MG) TAB at 06:31

## 2024-07-26 RX ADMIN — ENOXAPARIN SODIUM 40 MG: 40 INJECTION SUBCUTANEOUS at 13:15

## 2024-07-26 RX ADMIN — HYDROMORPHONE HYDROCHLORIDE 0.4 MG: 0.2 INJECTION, SOLUTION INTRAMUSCULAR; INTRAVENOUS; SUBCUTANEOUS at 05:04

## 2024-07-26 RX ADMIN — THIAMINE HCL TAB 100 MG 100 MG: 100 TAB at 07:46

## 2024-07-26 RX ADMIN — SODIUM PHOSPHATE, MONOBASIC, MONOHYDRATE AND SODIUM PHOSPHATE, DIBASIC, ANHYDROUS 15 MMOL: 142; 276 INJECTION, SOLUTION INTRAVENOUS at 08:05

## 2024-07-26 RX ADMIN — PROCHLORPERAZINE EDISYLATE 5 MG: 5 INJECTION INTRAMUSCULAR; INTRAVENOUS at 16:18

## 2024-07-26 RX ADMIN — SODIUM CHLORIDE, POTASSIUM CHLORIDE, SODIUM LACTATE AND CALCIUM CHLORIDE: 600; 310; 30; 20 INJECTION, SOLUTION INTRAVENOUS at 13:11

## 2024-07-26 RX ADMIN — HYDROMORPHONE HYDROCHLORIDE 0.4 MG: 0.2 INJECTION, SOLUTION INTRAMUSCULAR; INTRAVENOUS; SUBCUTANEOUS at 18:33

## 2024-07-26 RX ADMIN — LORAZEPAM 1 MG: 2 INJECTION INTRAMUSCULAR; INTRAVENOUS at 18:27

## 2024-07-26 RX ADMIN — HYDROMORPHONE HYDROCHLORIDE 0.4 MG: 0.2 INJECTION, SOLUTION INTRAMUSCULAR; INTRAVENOUS; SUBCUTANEOUS at 11:11

## 2024-07-26 RX ADMIN — ONDANSETRON 4 MG: 2 INJECTION INTRAMUSCULAR; INTRAVENOUS at 07:54

## 2024-07-26 RX ADMIN — IOPAMIDOL 90 ML: 755 INJECTION, SOLUTION INTRAVENOUS at 18:59

## 2024-07-26 RX ADMIN — ACETAMINOPHEN 650 MG: 325 TABLET ORAL at 11:11

## 2024-07-26 RX ADMIN — HYDROMORPHONE HYDROCHLORIDE 0.2 MG: 0.2 INJECTION, SOLUTION INTRAMUSCULAR; INTRAVENOUS; SUBCUTANEOUS at 21:39

## 2024-07-26 RX ADMIN — HYDROMORPHONE HYDROCHLORIDE 0.4 MG: 0.2 INJECTION, SOLUTION INTRAMUSCULAR; INTRAVENOUS; SUBCUTANEOUS at 07:54

## 2024-07-26 RX ADMIN — PANTOPRAZOLE SODIUM 40 MG: 40 INJECTION, POWDER, FOR SOLUTION INTRAVENOUS at 07:44

## 2024-07-26 RX ADMIN — ONDANSETRON 4 MG: 2 INJECTION INTRAMUSCULAR; INTRAVENOUS at 19:54

## 2024-07-26 RX ADMIN — MEROPENEM 1 G: 1 INJECTION, POWDER, FOR SOLUTION INTRAVENOUS at 17:16

## 2024-07-26 RX ADMIN — Medication 1 TABLET: at 07:46

## 2024-07-26 RX ADMIN — SODIUM PHOSPHATE, MONOBASIC, MONOHYDRATE AND SODIUM PHOSPHATE, DIBASIC, ANHYDROUS 15 MMOL: 142; 276 INJECTION, SOLUTION INTRAVENOUS at 10:09

## 2024-07-26 RX ADMIN — FOLIC ACID 1 MG: 1 TABLET ORAL at 07:46

## 2024-07-26 RX ADMIN — HYDROMORPHONE HYDROCHLORIDE 0.4 MG: 0.2 INJECTION, SOLUTION INTRAMUSCULAR; INTRAVENOUS; SUBCUTANEOUS at 14:26

## 2024-07-26 RX ADMIN — PANTOPRAZOLE SODIUM 40 MG: 40 TABLET, DELAYED RELEASE ORAL at 17:16

## 2024-07-26 RX ADMIN — LORAZEPAM 2 MG: 2 INJECTION INTRAMUSCULAR; INTRAVENOUS at 14:26

## 2024-07-26 RX ADMIN — ACETAMINOPHEN 650 MG: 325 TABLET ORAL at 18:34

## 2024-07-26 RX ADMIN — SODIUM CHLORIDE, POTASSIUM CHLORIDE, SODIUM LACTATE AND CALCIUM CHLORIDE: 600; 310; 30; 20 INJECTION, SOLUTION INTRAVENOUS at 06:29

## 2024-07-26 RX ADMIN — MEROPENEM 1 G: 1 INJECTION, POWDER, FOR SOLUTION INTRAVENOUS at 02:20

## 2024-07-26 ASSESSMENT — ACTIVITIES OF DAILY LIVING (ADL)
ADLS_ACUITY_SCORE: 33
ADLS_ACUITY_SCORE: 33
ADLS_ACUITY_SCORE: 38
ADLS_ACUITY_SCORE: 33
ADLS_ACUITY_SCORE: 33
ADLS_ACUITY_SCORE: 30
ADLS_ACUITY_SCORE: 30
ADLS_ACUITY_SCORE: 38
ADLS_ACUITY_SCORE: 30
ADLS_ACUITY_SCORE: 38
ADLS_ACUITY_SCORE: 33
ADLS_ACUITY_SCORE: 30
ADLS_ACUITY_SCORE: 33
ADLS_ACUITY_SCORE: 30
ADLS_ACUITY_SCORE: 33
ADLS_ACUITY_SCORE: 38
ADLS_ACUITY_SCORE: 30
ADLS_ACUITY_SCORE: 33
ADLS_ACUITY_SCORE: 33

## 2024-07-26 NOTE — PROGRESS NOTES
[facility] progress Note    Subjective: Patient still complains of upper abdominal pain.  This is less than last day or 2.  Currently no nausea or recent vomiting.  Tolerating water, clear liquids.    Due to persisting temperature, patient has been started on meropenem.    Patient still on alcohol withdrawal protocol.    Objective:  Vital signs in last 24 hours  Temp:  [99.3  F (37.4  C)-100.7  F (38.2  C)] 100.6  F (38.1  C)  Pulse:  [] 103  Resp:  [17-34] 18  BP: (105-140)/(56-90) 127/82  SpO2:  [90 %-97 %] 95 %     Gen: Awake, no acute distress  Pulmonary: Lungs clear to ausculation bilaterally  Cardiovascular: Regular  Gastrointestinal: Positive bowel sounds, soft, mildly tender in the upper abdomen, non-distended, no rebound or guarding    Assessment: 1.  Severe alcoholic pancreatitis-with early necrosis.  This can worsen significantly in severity.  He has early fever which is probably more likely related to the pancreatitis, necrosis rather than infection, but reasonable to cover with meropenem for now.  We can continue this for a week and if he is doing well, potentially stop it at that time (if cultures negative) to see if fevers recur.  Given his immobility, we should use incentive spirometer to try to prevent pneumonia, give subcu anticoagulation to try to prevent DVT.  It may be reasonable to try to decrease the lactated Ringer's to 100 cc/h.    Plan: Decrease lactated Ringer's to 100 cc/h.  Slowly advance diet as tolerated to low-fat, discussed with patient and nurse again.  Incentive spirometer, subcu heparin or Lovenox.    Patient Active Problem List   Diagnosis    Cervical radiculopathy    Accelerated hypertension    Hypokalemia    Hyponatremia    Acute kidney injury (H24)    Alcohol withdrawal syndrome with complication (H)    Alcohol-induced acute pancreatitis without infection or necrosis    Hypomagnesemia    Alcoholic hepatitis without ascites (H28)       Labs:  CMP Results:    Recent Labs   Lab Test 07/26/24  0811 07/26/24 0417 07/25/24 0726 07/25/24 0453   NA  --  133*  --  135   POTASSIUM  --  3.6  --  3.6   CHLORIDE  --  100  --  100   CO2  --  27  --  25   ANIONGAP  --  6*  --  10   * 109*   < > 179*   BUN  --  16.8  --  11.7   CR  --  0.85  --  0.71   BILITOTAL  --   --   --  1.1   ALKPHOS  --   --   --  58   ALT  --   --   --  90*   AST  --   --   --  84*    < > = values in this interval not displayed.      CBC  Recent Labs   Lab 07/26/24 0417 07/25/24 0453 07/24/24  0801 07/24/24  0003   WBC 7.1 5.7 11.5* 7.9   RBC 3.82* 4.45 4.42 4.89   HGB 12.2* 14.2 14.2 15.8   HCT 37.0* 42.0 41.7 45.3   MCV 97 94 94 93   MCH 31.9 31.9 32.1 32.3   MCHC 33.0 33.8 34.1 34.9   RDW 12.8 12.9 12.7 12.4   PLT 93* 104* 141* 172     INRNo lab results found in last 7 days.   Lipase   Date Value Ref Range Status   07/24/2024 1,425 (H) 13 - 60 U/L Final   07/24/2024 1,207 (H) 13 - 60 U/L Final   04/10/2024 117 (H) 13 - 60 U/L Final   02/23/2022 16 0 - 52 U/L Final   10/05/2021 25 0 - 52 U/L Final     Recent Labs   Lab 07/25/24 0453 07/24/24  0801 07/24/24  0003   AST 84* 200* 274*   ALT 90* 163* 203*   ALKPHOS 58 118 138   BILITOTAL 1.1 0.6 0.5   LIPASE  --  1,425* 1,207*       Imaging: XR Chest Port 1 View    Result Date: 7/25/2024  EXAM: XR CHEST PORT 1 VIEW LOCATION: [facility] DATE: 7/25/2024 INDICATION: Emesis. Assess for pneumonia. COMPARISON: 2/23/2022     IMPRESSION: Heart size magnified in AP projection with normal vascularity. Shallow inspiration accentuates bibasilar subsegmental atelectasis. No pneumothorax nor pleural effusion.    XR Nasal Gastric Tube Placement    Result Date: 7/24/2024  EXAM: XR NASAL GASTRIC TUBE PLACEMENT LOCATION: [facility] DATE: 7/24/2024 INDICATION: Need NJ tube for severe pancreatitis COMPARISON: CT 7/24/2024 TECHNIQUE: Intraoperative fluoroscopy performed during the patient's procedure. RADIATION DOSE:  Total Air Kerma 9.9 mGy FINDINGS: Utilizing fluoroscopic guidance, an enteric feeding tube was placed with the tip located in the distal gastric antrum. Despite multiple attempts, the tip could not be advanced beyond the pylorus.    CT Abdomen Pelvis w Contrast    Result Date: 7/24/2024  EXAM: CT ABDOMEN PELVIS W CONTRAST LOCATION: [facility] DATE: 7/24/2024 INDICATION: acute pancreatitis COMPARISON: None. TECHNIQUE: CT scan of the abdomen and pelvis was performed following injection of IV contrast. Multiplanar reformats were obtained. Dose reduction techniques were used. CONTRAST: IsoVue 370 90mL FINDINGS: LOWER CHEST: Basilar atelectasis. HEPATOBILIARY: Hepatic steatosis. PANCREAS: Peripancreatic inflammation and adjacent fluid consistent with acute pancreatitis. Prominent area of hypoenhancement pancreatic head and uncinate process as 3 image 1074.3 x 2.9 cm in size. SPLEEN: Normal. ADRENAL GLANDS: Normal. KIDNEYS/BLADDER: Normal. BOWEL: Duodenal wall thickening and wall thickening hepatic flexure of colon presumably secondary to pancreatic inflammation. Bowel is normal caliber. LYMPH NODES: Borderline left retroperitoneal lymph node. VASCULATURE: Atherosclerotic vascular calcification PELVIC ORGANS: Small amount of pelvic free fluid. Postsurgical change scrotum. MUSCULOSKELETAL: Degenerative change osseous structures.     IMPRESSION: 1.  Acute pancreatitis. Prominent hypoenhancement pancreatic head and uncinate process could be due to area of necrotizing pancreatitis. Follow-up to confirm resolution is necessary to exclude underlying mass. 2.  Wall thickening duodenum and hepatic flexure of colon presumably secondary to pancreatic inflammation. 3.  Borderline left retroperitoneal lymph nodes.      The total time spent with this patient was 25 minutes.                                                Heriberto Soto MD  Thank you for the opportunity to participate in the care of this  patient.   Please feel free to call me with any questions or concerns.  Phone number [phone].

## 2024-07-26 NOTE — PROGRESS NOTES
[facility]    Medicine Progress Note - Hospitalist Service    Date of Admission:  7/23/2024    Assessment & Plan   44-year-old male with a significant medical history of hypertension hyperlipidemia, gastroesophageal reflux disease, EtOH abuse and alcohol withdrawal syndrome who is admitted  due to alcohol related acute pancreatitis     Acute necrotizing pancreatitis likely alcohol induced  -Tachycardia, leukocytosis improving - likely SIRS response to pancreatitis  - AGMA and Lactic acidosis resolved  - Had episodes of recurrent fevers, likely necrosis than infection  - Infectious work up UA, CXR negative, Bcx NGTD  - On empiric Meropenem  - Seen by GI, appreciate recommendations  - LR decreased 150cc/hr to 100cc/hr  - Advanced diet to low fat diet  - Antiemetics as needed  - Pain management with Dilaudid    EtOH abuse  Alcohol withdrawal  Has been drinking vodka daily, last drink 07/23/24  Denies history of alcohol withdrawal seizures or DTs  blood alcohol level high on presentation  Transaminitis improving, T.bili normal  Patient placed on CIWA protocol - Ativan  thiamine, folate, MVI  Trend LFT's  chemical dependency consult inpatient    Acute hypoxic respiratory failure likely 2/2 opioid use vs atelectasis  -Requiring 2 L NC, also tachycardic  -Suspect hypoxia likely due to Dilaudid and Ativan use and tachycardia likely 2/2 alcohol wihdrawal  -Patient was not on chemical prophylaxis due to thrombocytopenia, started Lovenox subcu  -D-dimer elevated, will rule out PE, pending CT PE  -Supplemental oxygen as needed  -Incentive spirometer    HTN  Hypertensive urgency likely 2/2 necrotizing pancreatitis and suspect alcohol withdrawal - resolved  -Blood pressure controlled  -Suspect Ativan and Dilaudid is lowering the blood pressure  -Off Nicardipine drip, held Clonidine and Lisinopril-hydrochlorothiazide    Hypokalemia  Hypomagnesemia  Hypophosphatemia  Monitor and replete as  needed    Hypocalcemia  2/2 pancreatitis  Ionized calcium normal  Asymptomatic    Thrombocytopenia - worsening  Unclear etiology  PC 93k  Started Lovenox for DVT ppx  Monitor Platelets    Acute drop in Hb   Hb 14.2 -> 12.2  No evidence of bleeding  Monitor Hb    GERD  Continue PPI    HLD  Resume statin when LFTs are improving    PT eval                Diet: Low Saturated Fat Diet    DVT Prophylaxis: Enoxaparin (Lovenox) SQ  Mancia Catheter: Not present  Lines: None     Cardiac Monitoring: ACTIVE order. Indication: ICU  Code Status: Full Code      Clinically Significant Risk Factors          # Hypocalcemia: Lowest Ca = 7.6 mg/dL in last 2 days, will monitor and replace as appropriate   # Hypomagnesemia: Lowest Mg = 1.6 mg/dL in last 2 days, will replace as needed   # Hypoalbuminemia: Lowest albumin = 3.4 g/dL at 7/25/2024  4:53 AM, will monitor as appropriate   # Thrombocytopenia: Lowest platelets = 93 in last 2 days, will monitor for bleeding   # Hypertension: Noted on problem list         #Precipitous drop in Hgb/Hct: Lowest Hgb this hospitalization: 12.2 g/dL. Will continue to monitor and treat/transfuse as appropriate.     # Overweight: Estimated body mass index is 27.82 kg/m  as calculated from the following:    Height as of this encounter: 1.829 m (6').    Weight as of this encounter: 93 kg (205 lb 1.6 oz)., PRESENT ON ADMISSION     # Financial/Environmental Concerns: none         Disposition Plan     Medically Ready for Discharge: Anticipated in 2-4 Days             Petrona Reeder MD  Hospitalist Service  [facility]  Securely message with [employer] (more info)  Text page via [employer] Paging/Directory   ______________________________________________________________________    Interval History   Patient was examined at the bedside, states he continues to have abdominal pain.  Diet advanced by GI.  Patient tachycardic and requiring 2 L NC supplemental oxygen, likely secondary to opioid use and  atelectasis.  Will rule out PE    Physical Exam   Vital Signs: Temp: (!) 100.6  F (38.1  C) Temp src: Oral BP: 130/81 Pulse: 99   Resp: 19 SpO2: 94 % O2 Device: Nasal cannula Oxygen Delivery: 2 LPM  Weight: 205 lbs 1.6 oz    General Aox3, moderate distress due to pain, on 2L NC  Lungs: CTAB  Heart RRR, No M/R/G  Abd- Soft,diffusely tender, BS decreased  Extremity- Moving all extremities, no edema  Neuro- Aox3    Medical Decision Making       65 MINUTES SPENT BY ME on the date of service doing chart review, history, exam, documentation & further activities per the note.      Data     I have personally reviewed the following data over the past 24 hrs:    7.1  \   12.2 (L)   / 93 (L)     133 (L) 100 16.8 /  113 (H)   3.6 27 0.85 \     Procal: N/A CRP: N/A Lactic Acid: 1.3       INR:  N/A PTT:  N/A   D-dimer:  10.09 (H) Fibrinogen:  N/A       Imaging results reviewed over the past 24 hrs:   No results found for this or any previous visit (from the past 24 hour(s)).

## 2024-07-26 NOTE — PROGRESS NOTES
Due to patient continuing to receive dilaudid and ativan, patient doesn't appear appropriate to be seen at this time.  Staff will attempt to see pt for CD Consult Monday 7/29/24.  If pt discharges prior to consult, they can call [place] at [phone] to schedule an OP EMILY Assessment.    AMELIA Redd on 7/26/2024 at 2:12 PM    Attempted to call patient again. Was informed by staff that patient is sleeping very heavily and staff didn't want to wake him up.  Will try again later today.     AMELIA Redd on 7/26/2024 at 9:48 AM

## 2024-07-26 NOTE — PLAN OF CARE
Goal Outcome Evaluation:  [facility] - ICU    RN Progress Note:            Pertinent Assessments:      Please refer to flowsheet rows for full assessment     Patient on CIWA Protocol for alcohol withdrawal. Patient medicated with IV ativan in the evening along with haldol which helped patient to calm down. Patient much more relaxed and slept most of the shift. Bedside attendee for the safety of patient.           Key Events - This Shift:       CIWA protocol               Barriers to Discharge / Downgrade:     Alcohol withdrawal, chem-dep to assess patient.             Plan of Care Reviewed With: patient    Overall Patient Progress: improvingOverall Patient Progress: improving    Outcome Evaluation: improving

## 2024-07-26 NOTE — PLAN OF CARE
Goal Outcome Evaluation:       [facility] - ICU    RN Progress Note:Patient alert and oriented, is lethargic at times.  Did have a ciwa score of 13, ativan given.  Is receiving dilaudid for pain control.  Patient reports it is hard to move around in bed due to pain in abdomen.  VSS, 2L NC, tylenol given x1 for temp of 100.6.  Tolerating taking po, advanced diet to low fat diet.  Bedside attendee is with patient.              Pertinent Assessments:      Please refer to flowsheet rows for full assessment     CIWA protocol, temperature, VSS, pain control           Key Events - This Shift:       none        MIREYA SAT (Sedation Awakening Trial): For use ONLY if intubated             Barriers to Discharge / Downgrade:     Assessing to be down graded.

## 2024-07-26 NOTE — PROGRESS NOTES
CLINICAL NUTRITION SERVICES - BRIEF NOTE      EVALUATION OF THE PROGRESS TOWARD GOALS   Diet: Clear Liquid 7/25/24.      NEW FINDINGS   Patient sleeping this am.  Patient had feeding tube placed in x-ray 7/24/24 with plans to start postpyloric feeding but feeding tube came out with emesis shortly after placement.  Patient did not want feeding tube replaced.  Clear liquid diet started yesterday.    BM-none noted this admission  Labs: sodium 133 (L), phosphorus 1.9 (L) -replacing  I/O  7/25/24:  6189/1100  Medications reviewed.    INTERVENTIONS  Implementation  Diet advance per MD.

## 2024-07-27 LAB
ALBUMIN SERPL BCG-MCNC: 2.8 G/DL (ref 3.5–5.2)
ALP SERPL-CCNC: 81 U/L (ref 40–150)
ALT SERPL W P-5'-P-CCNC: 44 U/L (ref 0–70)
ANION GAP SERPL CALCULATED.3IONS-SCNC: 12 MMOL/L (ref 7–15)
AST SERPL W P-5'-P-CCNC: 46 U/L (ref 0–45)
BILIRUB DIRECT SERPL-MCNC: 0.55 MG/DL (ref 0–0.3)
BILIRUB SERPL-MCNC: 1.1 MG/DL
BUN SERPL-MCNC: 10.6 MG/DL (ref 6–20)
CALCIUM SERPL-MCNC: 8 MG/DL (ref 8.8–10.4)
CHLORIDE SERPL-SCNC: 98 MMOL/L (ref 98–107)
CREAT SERPL-MCNC: 0.61 MG/DL (ref 0.67–1.17)
EGFRCR SERPLBLD CKD-EPI 2021: >90 ML/MIN/1.73M2
ERYTHROCYTE [DISTWIDTH] IN BLOOD BY AUTOMATED COUNT: 12.1 % (ref 10–15)
GLUCOSE BLDC GLUCOMTR-MCNC: 108 MG/DL (ref 70–99)
GLUCOSE SERPL-MCNC: 104 MG/DL (ref 70–99)
HCO3 SERPL-SCNC: 21 MMOL/L (ref 22–29)
HCT VFR BLD AUTO: 36 % (ref 40–53)
HGB BLD-MCNC: 12.4 G/DL (ref 13.3–17.7)
LACTATE SERPL-SCNC: 1.2 MMOL/L (ref 0.7–2)
MAGNESIUM SERPL-MCNC: 2 MG/DL (ref 1.7–2.3)
MAGNESIUM SERPL-MCNC: 2.2 MG/DL (ref 1.7–2.3)
MCH RBC QN AUTO: 32.5 PG (ref 26.5–33)
MCHC RBC AUTO-ENTMCNC: 34.4 G/DL (ref 31.5–36.5)
MCV RBC AUTO: 95 FL (ref 78–100)
NT-PROBNP SERPL-MCNC: 1289 PG/ML (ref 0–450)
PHOSPHATE SERPL-MCNC: 1.1 MG/DL (ref 2.5–4.5)
PHOSPHATE SERPL-MCNC: 2.4 MG/DL (ref 2.5–4.5)
PHOSPHATE SERPL-MCNC: 2.7 MG/DL (ref 2.5–4.5)
PLATELET # BLD AUTO: 142 10E3/UL (ref 150–450)
POTASSIUM SERPL-SCNC: 3.3 MMOL/L (ref 3.4–5.3)
POTASSIUM SERPL-SCNC: 3.3 MMOL/L (ref 3.4–5.3)
POTASSIUM SERPL-SCNC: 3.6 MMOL/L (ref 3.4–5.3)
PROT SERPL-MCNC: 6.2 G/DL (ref 6.4–8.3)
RBC # BLD AUTO: 3.81 10E6/UL (ref 4.4–5.9)
SODIUM SERPL-SCNC: 131 MMOL/L (ref 135–145)
WBC # BLD AUTO: 9.9 10E3/UL (ref 4–11)

## 2024-07-27 PROCEDURE — 82248 BILIRUBIN DIRECT: CPT | Performed by: INTERNAL MEDICINE

## 2024-07-27 PROCEDURE — 250N000009 HC RX 250: Performed by: STUDENT IN AN ORGANIZED HEALTH CARE EDUCATION/TRAINING PROGRAM

## 2024-07-27 PROCEDURE — 36415 COLL VENOUS BLD VENIPUNCTURE: CPT

## 2024-07-27 PROCEDURE — 250N000011 HC RX IP 250 OP 636: Performed by: SURGERY

## 2024-07-27 PROCEDURE — 83605 ASSAY OF LACTIC ACID: CPT

## 2024-07-27 PROCEDURE — 258N000003 HC RX IP 258 OP 636: Performed by: SURGERY

## 2024-07-27 PROCEDURE — 200N000001 HC R&B ICU

## 2024-07-27 PROCEDURE — 250N000011 HC RX IP 250 OP 636: Performed by: INTERNAL MEDICINE

## 2024-07-27 PROCEDURE — 83735 ASSAY OF MAGNESIUM: CPT | Performed by: INTERNAL MEDICINE

## 2024-07-27 PROCEDURE — 250N000009 HC RX 250: Performed by: INTERNAL MEDICINE

## 2024-07-27 PROCEDURE — 84100 ASSAY OF PHOSPHORUS: CPT | Performed by: SURGERY

## 2024-07-27 PROCEDURE — 250N000011 HC RX IP 250 OP 636: Performed by: STUDENT IN AN ORGANIZED HEALTH CARE EDUCATION/TRAINING PROGRAM

## 2024-07-27 PROCEDURE — 99291 CRITICAL CARE FIRST HOUR: CPT | Performed by: INTERNAL MEDICINE

## 2024-07-27 PROCEDURE — 258N000003 HC RX IP 258 OP 636: Performed by: INTERNAL MEDICINE

## 2024-07-27 PROCEDURE — 36415 COLL VENOUS BLD VENIPUNCTURE: CPT | Performed by: INTERNAL MEDICINE

## 2024-07-27 PROCEDURE — 84132 ASSAY OF SERUM POTASSIUM: CPT | Performed by: STUDENT IN AN ORGANIZED HEALTH CARE EDUCATION/TRAINING PROGRAM

## 2024-07-27 PROCEDURE — 250N000011 HC RX IP 250 OP 636: Mod: JW

## 2024-07-27 PROCEDURE — 83735 ASSAY OF MAGNESIUM: CPT | Performed by: STUDENT IN AN ORGANIZED HEALTH CARE EDUCATION/TRAINING PROGRAM

## 2024-07-27 PROCEDURE — 999N000157 HC STATISTIC RCP TIME EA 10 MIN

## 2024-07-27 PROCEDURE — 250N000009 HC RX 250: Performed by: SURGERY

## 2024-07-27 PROCEDURE — 85048 AUTOMATED LEUKOCYTE COUNT: CPT | Performed by: STUDENT IN AN ORGANIZED HEALTH CARE EDUCATION/TRAINING PROGRAM

## 2024-07-27 PROCEDURE — 82374 ASSAY BLOOD CARBON DIOXIDE: CPT | Performed by: STUDENT IN AN ORGANIZED HEALTH CARE EDUCATION/TRAINING PROGRAM

## 2024-07-27 PROCEDURE — 84100 ASSAY OF PHOSPHORUS: CPT | Performed by: STUDENT IN AN ORGANIZED HEALTH CARE EDUCATION/TRAINING PROGRAM

## 2024-07-27 PROCEDURE — 83880 ASSAY OF NATRIURETIC PEPTIDE: CPT | Performed by: INTERNAL MEDICINE

## 2024-07-27 PROCEDURE — 84132 ASSAY OF SERUM POTASSIUM: CPT | Performed by: INTERNAL MEDICINE

## 2024-07-27 PROCEDURE — 36415 COLL VENOUS BLD VENIPUNCTURE: CPT | Performed by: STUDENT IN AN ORGANIZED HEALTH CARE EDUCATION/TRAINING PROGRAM

## 2024-07-27 PROCEDURE — 258N000003 HC RX IP 258 OP 636: Performed by: STUDENT IN AN ORGANIZED HEALTH CARE EDUCATION/TRAINING PROGRAM

## 2024-07-27 RX ORDER — METOPROLOL TARTRATE 1 MG/ML
5 INJECTION, SOLUTION INTRAVENOUS EVERY 6 HOURS PRN
Status: DISCONTINUED | OUTPATIENT
Start: 2024-07-27 | End: 2024-07-28

## 2024-07-27 RX ORDER — GABAPENTIN 300 MG/1
900 CAPSULE ORAL EVERY 8 HOURS
Status: DISCONTINUED | OUTPATIENT
Start: 2024-07-28 | End: 2024-07-28

## 2024-07-27 RX ORDER — FLUMAZENIL 0.1 MG/ML
0.2 INJECTION, SOLUTION INTRAVENOUS
Status: DISCONTINUED | OUTPATIENT
Start: 2024-07-27 | End: 2024-07-28

## 2024-07-27 RX ORDER — GABAPENTIN 100 MG/1
100 CAPSULE ORAL EVERY 8 HOURS
Status: DISCONTINUED | OUTPATIENT
Start: 2024-08-04 | End: 2024-07-28

## 2024-07-27 RX ORDER — POTASSIUM CHLORIDE 7.45 MG/ML
10 INJECTION INTRAVENOUS
Status: COMPLETED | OUTPATIENT
Start: 2024-07-27 | End: 2024-07-27

## 2024-07-27 RX ORDER — OLANZAPINE 10 MG/2ML
5 INJECTION, POWDER, FOR SOLUTION INTRAMUSCULAR
Status: COMPLETED | OUTPATIENT
Start: 2024-07-27 | End: 2024-07-27

## 2024-07-27 RX ORDER — GABAPENTIN 300 MG/1
300 CAPSULE ORAL EVERY 8 HOURS
Status: DISCONTINUED | OUTPATIENT
Start: 2024-08-02 | End: 2024-07-28

## 2024-07-27 RX ORDER — DEXMEDETOMIDINE HYDROCHLORIDE 4 UG/ML
.1-1.2 INJECTION, SOLUTION INTRAVENOUS CONTINUOUS
Status: DISCONTINUED | OUTPATIENT
Start: 2024-07-27 | End: 2024-07-28

## 2024-07-27 RX ORDER — GABAPENTIN 300 MG/1
1200 CAPSULE ORAL ONCE
Status: COMPLETED | OUTPATIENT
Start: 2024-07-27 | End: 2024-07-27

## 2024-07-27 RX ORDER — THIAMINE HYDROCHLORIDE 100 MG/ML
200 INJECTION, SOLUTION INTRAMUSCULAR; INTRAVENOUS 3 TIMES DAILY
Status: COMPLETED | OUTPATIENT
Start: 2024-07-27 | End: 2024-07-29

## 2024-07-27 RX ORDER — FUROSEMIDE 10 MG/ML
20 INJECTION INTRAMUSCULAR; INTRAVENOUS 2 TIMES DAILY
Status: COMPLETED | OUTPATIENT
Start: 2024-07-27 | End: 2024-07-28

## 2024-07-27 RX ORDER — FOLIC ACID 1 MG/1
1 TABLET ORAL DAILY
Status: DISCONTINUED | OUTPATIENT
Start: 2024-07-30 | End: 2024-08-01 | Stop reason: HOSPADM

## 2024-07-27 RX ORDER — HYDRALAZINE HYDROCHLORIDE 20 MG/ML
5 INJECTION INTRAMUSCULAR; INTRAVENOUS ONCE
Status: COMPLETED | OUTPATIENT
Start: 2024-07-27 | End: 2024-07-27

## 2024-07-27 RX ORDER — FOLIC ACID 5 MG/ML
1 INJECTION, SOLUTION INTRAMUSCULAR; INTRAVENOUS; SUBCUTANEOUS DAILY
Status: COMPLETED | OUTPATIENT
Start: 2024-07-28 | End: 2024-07-29

## 2024-07-27 RX ORDER — OLANZAPINE 10 MG/2ML
5 INJECTION, POWDER, FOR SOLUTION INTRAMUSCULAR
Status: DISCONTINUED | OUTPATIENT
Start: 2024-07-27 | End: 2024-07-27

## 2024-07-27 RX ORDER — HALOPERIDOL 5 MG/ML
2.5-5 INJECTION INTRAMUSCULAR EVERY 4 HOURS PRN
Status: DISCONTINUED | OUTPATIENT
Start: 2024-07-27 | End: 2024-08-01 | Stop reason: HOSPADM

## 2024-07-27 RX ORDER — GABAPENTIN 300 MG/1
600 CAPSULE ORAL EVERY 8 HOURS
Status: DISCONTINUED | OUTPATIENT
Start: 2024-07-31 | End: 2024-07-28

## 2024-07-27 RX ORDER — CLONIDINE HYDROCHLORIDE 0.2 MG/1
0.2 TABLET ORAL EVERY 8 HOURS
Status: DISCONTINUED | OUTPATIENT
Start: 2024-07-27 | End: 2024-07-28

## 2024-07-27 RX ORDER — FUROSEMIDE 10 MG/ML
20 INJECTION INTRAMUSCULAR; INTRAVENOUS ONCE
Status: COMPLETED | OUTPATIENT
Start: 2024-07-27 | End: 2024-07-27

## 2024-07-27 RX ORDER — MAGNESIUM SULFATE HEPTAHYDRATE 40 MG/ML
2 INJECTION, SOLUTION INTRAVENOUS ONCE
Status: COMPLETED | OUTPATIENT
Start: 2024-07-27 | End: 2024-07-27

## 2024-07-27 RX ORDER — MULTIPLE VITAMINS W/ MINERALS TAB 9MG-400MCG
1 TAB ORAL DAILY
Status: DISCONTINUED | OUTPATIENT
Start: 2024-07-27 | End: 2024-07-27

## 2024-07-27 RX ORDER — OLANZAPINE 10 MG/2ML
5 INJECTION, POWDER, FOR SOLUTION INTRAMUSCULAR
Status: DISCONTINUED | OUTPATIENT
Start: 2024-07-27 | End: 2024-07-28

## 2024-07-27 RX ORDER — HYDRALAZINE HYDROCHLORIDE 20 MG/ML
10 INJECTION INTRAMUSCULAR; INTRAVENOUS EVERY 4 HOURS PRN
Status: DISCONTINUED | OUTPATIENT
Start: 2024-07-27 | End: 2024-08-01 | Stop reason: HOSPADM

## 2024-07-27 RX ORDER — PHENOBARBITAL SODIUM 65 MG/ML
260 INJECTION, SOLUTION INTRAMUSCULAR; INTRAVENOUS ONCE
Status: COMPLETED | OUTPATIENT
Start: 2024-07-27 | End: 2024-07-27

## 2024-07-27 RX ORDER — FOLIC ACID 5 MG/ML
1 INJECTION, SOLUTION INTRAMUSCULAR; INTRAVENOUS; SUBCUTANEOUS ONCE
Status: COMPLETED | OUTPATIENT
Start: 2024-07-27 | End: 2024-07-27

## 2024-07-27 RX ADMIN — LORAZEPAM 2 MG: 2 INJECTION INTRAMUSCULAR; INTRAVENOUS at 04:16

## 2024-07-27 RX ADMIN — HALOPERIDOL LACTATE 2.5 MG: 5 INJECTION, SOLUTION INTRAMUSCULAR at 20:22

## 2024-07-27 RX ADMIN — HALOPERIDOL LACTATE 2.5 MG: 5 INJECTION, SOLUTION INTRAMUSCULAR at 15:18

## 2024-07-27 RX ADMIN — SODIUM PHOSPHATE, MONOBASIC, MONOHYDRATE AND SODIUM PHOSPHATE, DIBASIC, ANHYDROUS 9 MMOL: 142; 276 INJECTION, SOLUTION INTRAVENOUS at 22:07

## 2024-07-27 RX ADMIN — MEROPENEM 1 G: 1 INJECTION, POWDER, FOR SOLUTION INTRAVENOUS at 11:04

## 2024-07-27 RX ADMIN — LORAZEPAM 2 MG: 2 INJECTION INTRAMUSCULAR; INTRAVENOUS at 16:06

## 2024-07-27 RX ADMIN — LORAZEPAM 1 MG: 2 INJECTION INTRAMUSCULAR; INTRAVENOUS at 21:40

## 2024-07-27 RX ADMIN — POTASSIUM CHLORIDE 10 MEQ: 7.46 INJECTION, SOLUTION INTRAVENOUS at 17:12

## 2024-07-27 RX ADMIN — LORAZEPAM 2 MG: 2 INJECTION INTRAMUSCULAR; INTRAVENOUS at 12:44

## 2024-07-27 RX ADMIN — HYDRALAZINE HYDROCHLORIDE 10 MG: 20 INJECTION INTRAMUSCULAR; INTRAVENOUS at 02:06

## 2024-07-27 RX ADMIN — FUROSEMIDE 20 MG: 10 INJECTION, SOLUTION INTRAMUSCULAR; INTRAVENOUS at 20:08

## 2024-07-27 RX ADMIN — THIAMINE HYDROCHLORIDE 200 MG: 100 INJECTION, SOLUTION INTRAMUSCULAR; INTRAVENOUS at 20:05

## 2024-07-27 RX ADMIN — POTASSIUM CHLORIDE 10 MEQ: 10 INJECTION, SOLUTION INTRAVENOUS at 08:26

## 2024-07-27 RX ADMIN — FOLIC ACID 1 MG: 5 INJECTION, SOLUTION INTRAMUSCULAR; INTRAVENOUS; SUBCUTANEOUS at 21:43

## 2024-07-27 RX ADMIN — OLANZAPINE 5 MG: 10 INJECTION, POWDER, FOR SOLUTION INTRAMUSCULAR at 16:20

## 2024-07-27 RX ADMIN — SODIUM CHLORIDE, POTASSIUM CHLORIDE, SODIUM LACTATE AND CALCIUM CHLORIDE: 600; 310; 30; 20 INJECTION, SOLUTION INTRAVENOUS at 08:52

## 2024-07-27 RX ADMIN — METOPROLOL TARTRATE 5 MG: 5 INJECTION INTRAVENOUS at 20:08

## 2024-07-27 RX ADMIN — POTASSIUM CHLORIDE 10 MEQ: 7.46 INJECTION, SOLUTION INTRAVENOUS at 18:49

## 2024-07-27 RX ADMIN — POTASSIUM CHLORIDE 10 MEQ: 7.46 INJECTION, SOLUTION INTRAVENOUS at 20:28

## 2024-07-27 RX ADMIN — MEROPENEM 1 G: 1 INJECTION, POWDER, FOR SOLUTION INTRAVENOUS at 18:15

## 2024-07-27 RX ADMIN — DEXMEDETOMIDINE HYDROCHLORIDE 0.2 MCG/KG/HR: 400 INJECTION INTRAVENOUS at 04:43

## 2024-07-27 RX ADMIN — POTASSIUM CHLORIDE 10 MEQ: 10 INJECTION, SOLUTION INTRAVENOUS at 10:04

## 2024-07-27 RX ADMIN — HYDRALAZINE HYDROCHLORIDE 10 MG: 20 INJECTION INTRAMUSCULAR; INTRAVENOUS at 15:33

## 2024-07-27 RX ADMIN — HYDRALAZINE HYDROCHLORIDE 10 MG: 20 INJECTION INTRAMUSCULAR; INTRAVENOUS at 22:04

## 2024-07-27 RX ADMIN — MEROPENEM 1 G: 1 INJECTION, POWDER, FOR SOLUTION INTRAVENOUS at 01:15

## 2024-07-27 RX ADMIN — DEXMEDETOMIDINE HYDROCHLORIDE 0.5 MCG/KG/HR: 400 INJECTION INTRAVENOUS at 13:14

## 2024-07-27 RX ADMIN — LORAZEPAM 2 MG: 2 INJECTION INTRAMUSCULAR; INTRAVENOUS at 20:03

## 2024-07-27 RX ADMIN — POTASSIUM CHLORIDE 10 MEQ: 10 INJECTION, SOLUTION INTRAVENOUS at 12:14

## 2024-07-27 RX ADMIN — LORAZEPAM 2 MG: 2 INJECTION INTRAMUSCULAR; INTRAVENOUS at 14:48

## 2024-07-27 RX ADMIN — OLANZAPINE 5 MG: 10 INJECTION, POWDER, FOR SOLUTION INTRAMUSCULAR at 04:25

## 2024-07-27 RX ADMIN — LORAZEPAM 2 MG: 2 INJECTION INTRAMUSCULAR; INTRAVENOUS at 06:26

## 2024-07-27 RX ADMIN — POTASSIUM CHLORIDE 10 MEQ: 7.46 INJECTION, SOLUTION INTRAVENOUS at 21:41

## 2024-07-27 RX ADMIN — OLANZAPINE 5 MG: 10 INJECTION, POWDER, FOR SOLUTION INTRAMUSCULAR at 12:04

## 2024-07-27 RX ADMIN — SODIUM PHOSPHATE, MONOBASIC, MONOHYDRATE AND SODIUM PHOSPHATE, DIBASIC, ANHYDROUS 15 MMOL: 142; 276 INJECTION, SOLUTION INTRAVENOUS at 14:49

## 2024-07-27 RX ADMIN — FUROSEMIDE 20 MG: 10 INJECTION, SOLUTION INTRAMUSCULAR; INTRAVENOUS at 12:13

## 2024-07-27 RX ADMIN — ENOXAPARIN SODIUM 40 MG: 40 INJECTION SUBCUTANEOUS at 13:14

## 2024-07-27 RX ADMIN — LORAZEPAM 2 MG: 2 INJECTION INTRAMUSCULAR; INTRAVENOUS at 01:44

## 2024-07-27 RX ADMIN — LORAZEPAM 2 MG: 2 INJECTION INTRAMUSCULAR; INTRAVENOUS at 12:04

## 2024-07-27 RX ADMIN — DEXMEDETOMIDINE HYDROCHLORIDE 0.6 MCG/KG/HR: 400 INJECTION INTRAVENOUS at 20:27

## 2024-07-27 RX ADMIN — POTASSIUM CHLORIDE 10 MEQ: 10 INJECTION, SOLUTION INTRAVENOUS at 11:04

## 2024-07-27 RX ADMIN — LORAZEPAM 2 MG: 2 INJECTION INTRAMUSCULAR; INTRAVENOUS at 20:33

## 2024-07-27 RX ADMIN — PHENOBARBITAL SODIUM 260 MG: 65 INJECTION INTRAMUSCULAR; INTRAVENOUS at 23:21

## 2024-07-27 RX ADMIN — LORAZEPAM 2 MG: 2 INJECTION INTRAMUSCULAR; INTRAVENOUS at 08:20

## 2024-07-27 RX ADMIN — SODIUM PHOSPHATE, MONOBASIC, MONOHYDRATE AND SODIUM PHOSPHATE, DIBASIC, ANHYDROUS 15 MMOL: 142; 276 INJECTION, SOLUTION INTRAVENOUS at 16:24

## 2024-07-27 RX ADMIN — MAGNESIUM SULFATE HEPTAHYDRATE 2 G: 40 INJECTION, SOLUTION INTRAVENOUS at 08:28

## 2024-07-27 ASSESSMENT — ACTIVITIES OF DAILY LIVING (ADL)
ADLS_ACUITY_SCORE: 38
ADLS_ACUITY_SCORE: 41
ADLS_ACUITY_SCORE: 38
ADLS_ACUITY_SCORE: 41
ADLS_ACUITY_SCORE: 38
ADLS_ACUITY_SCORE: 41
ADLS_ACUITY_SCORE: 41
ADLS_ACUITY_SCORE: 38
ADLS_ACUITY_SCORE: 38
ADLS_ACUITY_SCORE: 41
ADLS_ACUITY_SCORE: 38
ADLS_ACUITY_SCORE: 41

## 2024-07-27 NOTE — PROGRESS NOTES
Care Management Follow Up    Length of Stay (days): 3    Expected Discharge Date: 07/31/2024    Anticipated Discharge Plan:   TBD    Transportation: TBD    PT Recommendations:    OT Recommendations:        Barriers to Discharge: medical stability, ETOH withdrawal, agitation, pain-utilizing IV Dilaudid, able to tolerate diet    Prior Living Situation:  Patient is independent with all activities of daily living and instrumental activities of daily living (IADLs) at baseline. His main concern is getting in to treatment. He would like to be evaluated for inpatient CD treatment.     Patient/Spokesperson Updated: No    Additional Information:  Patient with medical history of hypertension hyperlipidemia, gastroesophageal reflux disease, EtOH abuse and alcohol withdrawal syndrome who is admitted due to alcohol related acute pancreatitis.  GI following.  CD Counselor consulted. Per note 7/26: Due to patient continuing to receive dilaudid and ativan, patient doesn't appear appropriate to be seen at this time.  Staff will attempt to see pt for CD Consult Monday 7/29/24.      7/27/24:  Goal is to discharge to IP CD Treatment. SWCM to follow.      Phylicia Avalos RN

## 2024-07-27 NOTE — PROGRESS NOTES
ICU X Cover    Patient with increasing agitation overnight, earlier in evening threatening to leave AMA, redirectable via significant other and responsive to prn benzo, However patient repeated attempt to get up in leave early this AM with agitation thus precedex ordered     Remberto Bae MD

## 2024-07-27 NOTE — PLAN OF CARE
Goal Outcome Evaluation:      Plan of Care Reviewed With: significant other, parent    Overall Patient Progress: improvingOverall Patient Progress: improving     [facility] - ICU    RN Progress Note:            Pertinent Assessments:      Please refer to flowsheet rows for full assessment     CIWA, RASS           Key Events - This Shift:     Replacing electrolytes, Ativan given several times this shift, along with haldol x1, and zyprexa x2. Precedex gtt at 0.7. Hydralazine given x 1.        JESUSN SAT (Sedation Awakening Trial): For use ONLY if intubated    SAT Safety Screen Passed   If FAILED why?    SAT Performed yes   If FAILED why? Failed, due to behavior              Barriers to Discharge / Downgrade:     Precedex gtt, CIWA         Point of Contact Update YES-OR-NO: Yes  If No, reason:  Name: Andrés (dad) & Kassie (significant other)  Phone Number: on file  Summary of Conversation: update on patient status, plan of care

## 2024-07-27 NOTE — PLAN OF CARE
Goal Outcome Evaluation:        [facility] - ICU    RN Progress Note:            Pertinent Assessments:      Please refer to flowsheet rows for full assessment     Pt  became very restlessness and demanded to go home against medical advise.  Resident Dr. Adan called  twice at the bedside. Pt's significant other also called at the bedside and pt finally became calm and finally pt agreed to stay if his significant other Kassie stays with him overnight. Pt's significant other Kassie is staying with the pt overnight. [Pt continues to be CIWA protocol as ordered.             Key Events - This Shift:     See above        SJN SAT (Sedation Awakening Trial): For use ONLY if intubated  Not applicable.              Barriers to Discharge / Downgrade:     Pt remains in acute phase of Alcohol withdrawal.           Point of Contact Update YES-OR-NO: Yes  Pt's significant other at the bedside and updated by pt's primary RN and Dr. Adan.           Problem: Adult Inpatient Plan of Care  Goal: Absence of Hospital-Acquired Illness or Injury  Intervention: Identify and Manage Fall Risk  Recent Flowsheet Documentation  Taken 7/26/2024 1800 by Bettina Garcia RN  Safety Promotion/Fall Prevention:   activity supervised   patient and family education   lighting adjusted   safety round/check completed   bedside attendant  Taken 7/26/2024 1600 by Bettina aGrcia RN  Safety Promotion/Fall Prevention:   activity supervised   patient and family education   lighting adjusted   safety round/check completed   bedside attendant  Intervention: Prevent Skin Injury  Recent Flowsheet Documentation  Taken 7/26/2024 1800 by Bettina Garcia RN  Skin Protection: adhesive use limited  Device Skin Pressure Protection:   adhesive use limited   absorbent pad utilized/changed  Taken 7/26/2024 1600 by Bettina Garcia RN  Skin Protection: adhesive use limited  Device Skin Pressure Protection:   adhesive use limited    absorbent pad utilized/changed  Intervention: Prevent Infection  Recent Flowsheet Documentation  Taken 7/26/2024 1800 by Bettina Garcia RN  Infection Prevention:   hand hygiene promoted   equipment surfaces disinfected   environmental surveillance performed   cohorting utilized  Taken 7/26/2024 1600 by Bettina Garcia RN  Infection Prevention:   hand hygiene promoted   equipment surfaces disinfected   environmental surveillance performed   cohorting utilized  Goal: Optimal Comfort and Wellbeing  Intervention: Monitor Pain and Promote Comfort  Recent Flowsheet Documentation  Taken 7/26/2024 2139 by Bettina Garcia RN  Pain Management Interventions: medication (see MAR)  Taken 7/26/2024 2000 by Bettina Garcia RN  Pain Management Interventions:   emotional support   environmental changes  Taken 7/26/2024 1833 by Bettina Garcia RN  Pain Management Interventions: medication (see MAR)  Intervention: Provide Person-Centered Care  Recent Flowsheet Documentation  Taken 7/26/2024 1800 by Bettina Garcia RN  Trust Relationship/Rapport:   care explained   choices provided  Taken 7/26/2024 1600 by Bettina Garcia RN  Trust Relationship/Rapport:   care explained   choices provided

## 2024-07-27 NOTE — PLAN OF CARE
Problem: Alcohol Withdrawal  Goal: Alcohol Withdrawal Symptom Control  Outcome: Not Progressing  Intervention: Minimize or Manage Alcohol Withdrawal Symptoms  Recent Flowsheet Documentation  Taken 7/27/2024 0000 by Felice Jackson, RN  Sensory Stimulation Regulation: lighting decreased   Goal Outcome Evaluation:         [facility] - ICU    RN Progress Note:            Pertinent Assessments:      Please refer to flowsheet rows for full assessment     Patient confused and agitated wanted to go home.            Key Events - This Shift:       Refused to keep Tele pad, oxygen prop and BP cuff when staff attempted to put them back pt gets very upset and wanted to go home House officer called at bed side. Precedex started Pt treated with ativan for SIWA score. Wife at bedside.     1:1 at bed side for safety.      '-140's and BP up to 180's Dr. Adan at bedside not concern since pt is going through withdraw. Will continue to monitor.        Barriers to Discharge / Downgrade:     In acute Alcohol withdraw.

## 2024-07-27 NOTE — SIGNIFICANT EVENT
Significant Event Note    Time of event: 10:19 PM July 26, 2024    Description of event:  Paged by RN due to patient wanting to leave AMA. Briefly, here for necrotizing pancreatitis and alcohol withdrawal with increasing levels of agitation. Significant other now present in the room. Slightly responsive to benzos and antipsychotics, however agitation continued to increase and he continued to attempt to get up and leave, thus precedex started per ICU.    Paged again around 6:30 am due to hypertension and tachycardia. Potentially side effect of precedex versus ongoing alcohol withdrawal. 10 mg hydralazine given and will continue to give benzos based upon CIWA scoring.     Plan:  - 10 IV hydralazine    IBAN GARCIA MD

## 2024-07-27 NOTE — CONSULTS
CRITICAL CARE CONSULT:    Assessment/Plan:    NEURO:  EtOH withdrawal delirium requiring Precedex.  Last drink 7/23  On CIWA protocol and low-dose Precedex infusion.  Wean Precedex as able today  Has as needed orders for Haldol and Zyprexa. Qtc is appropriate  Unable to take oral vitamins today due to mental status.  Reassess in next 24 hours and change to IV if needed  Being followed for possible chemical dependency treatment once medically stable    GI:  - Acute, alcohol induced necrotizing pancreatitis  - GERD, home PPI  Fevers resolved with the addition of meropenem which per GI continue x 1 week and is abx of choice for necrotizing pancreatitis.  Low-fat diet is ordered, not taking any p.o. today due to mentation.  Discussed with GI, apparently when lucid the patient adamantly refused NG tube despite the discussion of the benefits of nutrition.   Discussed directly with Dr. Story, agreeable to reducing IV feed fluids further and diuresing with Lasix  Likely to reimage abdomen within the coming week    CV:  Intermittently hypertensive in the setting of alcohol withdrawal  As needed hydralazine as ordered    RESP:  On 1 to 2 L of oxygen presently.  Despite sedation end-tidal CO2 in low 40s.  Continue to monitor.  Is third spacing fluid with moderate bilateral pleural effusions and low albumin.  Decrease continuous fluids to 50 cc/h, Lasix 20 IV x 1 and monitor response and will likely schedule    RENAL:  Monitor I's and O's and replete electrolytes as needed.  Follow calcium levels in the setting of pancreatitis.  Suspect the declining sodium is dilutional     ID:  Continue meropenem at GI direction for necrotizing pancreatitis.  Has defervesced continue to monitor    HEMATOLOGIC:  Suspect hemoglobin dropped to be delusional, no signs of active bleeding.  Continue to monitor.  Platelets have rebounded, continue to monitor    ENDOCRINE:  Add every 6 hours fingersticks to monitor for hypoglycemia, given lack of  p.o. intake and severe pancreatitis    ICU Checklist:  Feeding: Currently ordered for a low-fat diet    Lines/Tubes:  PIV x3  Condom cath    Prophylaxis:  Thromboembolic: LMWH  Stress Ulcer: N/A    Restraints? no    DISPOSITION: ICU status while requiring Precedex    CODE STATUS: Full code    FAMILY COMMUNICATION: Patient and family updated at the bedside      Total Critical Care Time : 60 minutes      Mame Giron MD  Pulmonary and Critical Care Medicine  [facility]  Office: [phone]    Clinically Significant Risk Factors        # Hypokalemia: Lowest K = 3.3 mmol/L in last 2 days, will replace as needed       # Hypoalbuminemia: Lowest albumin = 3.4 g/dL at 7/25/2024  4:53 AM, will monitor as appropriate   # Thrombocytopenia: Lowest platelets = 93 in last 2 days, will monitor for bleeding   # Hypertension: Noted on problem list         #Precipitous drop in Hgb/Hct: Lowest Hgb this hospitalization: 12.2 g/dL. Will continue to monitor and treat/transfuse as appropriate.     # Overweight: Estimated body mass index is 27.82 kg/m  as calculated from the following:    Height as of this encounter: 1.829 m (6').    Weight as of this encounter: 93 kg (205 lb 1.6 oz)., PRESENT ON ADMISSION     # Financial/Environmental Concerns: none            --------------------------    CCx: Use of Precedex    HPI:   44-year-old male with a history of hypertension, GERD, alcohol abuse and dependence who presented to the ER on 7/23 with nausea vomiting and abdominal pain.  Workup indicative of alcoholic hepatitis, with acute pancreatitis.  Admitted for pancreatitis management and subsequently now alcohol withdrawal.    Gastroenterology is following, with concern for some early pancreatic necrosis and some elevated risk factors regarding his pancreatitis.  Enteral feeding ASAP was recommended.  Patient removed NG tube and refused replacement.  Due to persistent seizures and fevers he was started on meropenem with GI  recommending 1 week of treatment.    Overnight on 7/26 due to increasing agitation and symptoms of alcohol withdrawal, he was started on a Precedex drip.    CT scan yesterday was notable for moderate bilateral pleural effusions.  He is currently saturating well on 2 L.  He is somnolent but arousable on exam.  On end-tidal CO2 monitoring pCO2 low 40s.  Added on BNP 1300, albumin 2.8.  LFTs are down-trending.      ROS:  A 12-system review was obtained and was negative with the exception of the symptoms endorsed in the history of present illness.    Past Medical History:  Past Medical History:   Diagnosis Date    Accelerated hypertension     Acute kidney injury (H) 2/23/2022    Alcohol withdrawal syndrome with complication (H) 2/23/2022    Cervical radiculopathy 4/19/2016    Hypokalemia     Hyponatremia 2/23/2022      Past Surgical History:  Past Surgical History:   Procedure Laterality Date    BACK SURGERY      IR CERVICAL EPIDURAL STEROID INJECTION  4/21/2016     Social History:  Social History     Socioeconomic History    Marital status:      Spouse name: Not on file    Number of children: 3    Years of education: Not on file    Highest education level: Not on file   Occupational History    Occupation: [employer]   Tobacco Use    Smoking status: Never    Smokeless tobacco: Not on file   Substance and Sexual Activity    Alcohol use: Yes     Alcohol/week: 7.0 standard drinks of alcohol     Comment: Alcoholic Drinks/day: One drink a day    Drug use: Not on file    Sexual activity: Not on file   Other Topics Concern    Not on file   Social History Narrative    He is currently in the  (25 years). He is a single father of 3 kids. He is .     Social Determinants of Health     Financial Resource Strain: Not on file   Food Insecurity: Not on file   Transportation Needs: Not on file   Physical Activity: Not on file   Stress: Not on file   Social Connections: Not on file   Interpersonal Safety: Not on  file   Housing Stability: Not on file     Family History:  Family History   Problem Relation Age of Onset    Coronary Artery Disease Paternal Grandfather      Allergies:  No Known Allergies      Physical Exam:  Resp: 20    BP (!) 158/100   Pulse 93   Temp 100.1  F (37.8  C) (Axillary)   Resp 20   Ht 1.829 m (6')   Wt 93 kg (205 lb 1.6 oz)   SpO2 97%   BMI 27.82 kg/m      Intake/Output last 3 shifts:  I/O last 3 completed shifts:  In: 4394.51 [P.O.:960; I.V.:3434.51]  Out: 2125 [Urine:2125]  Intake/Output this shift:  I/O this shift:  In: 113 [I.V.:113]  Out: 50 [Urine:50]    Physical Exam  Gen: Sedated  HEENT: NT, no POLI  CV: RRR, no m/g/r  Resp: CTAB  Abd: Distended, hypoactive BS  Skin: no rashes or lesions  Ext: no edema  Neuro: PERRL, nonfocal exam    LABS:  Reviewed in detail    IMAGING:  Personally reviewed and interpreted

## 2024-07-28 LAB
ALBUMIN SERPL BCG-MCNC: 2.9 G/DL (ref 3.5–5.2)
ALP SERPL-CCNC: 111 U/L (ref 40–150)
ALT SERPL W P-5'-P-CCNC: 35 U/L (ref 0–70)
ANION GAP SERPL CALCULATED.3IONS-SCNC: 12 MMOL/L (ref 7–15)
AST SERPL W P-5'-P-CCNC: 35 U/L (ref 0–45)
BILIRUB SERPL-MCNC: 0.8 MG/DL
BUN SERPL-MCNC: 11.9 MG/DL (ref 6–20)
CA-I BLD-MCNC: 4.6 MG/DL (ref 4.4–5.2)
CALCIUM SERPL-MCNC: 8.4 MG/DL (ref 8.8–10.4)
CHLORIDE SERPL-SCNC: 105 MMOL/L (ref 98–107)
CREAT SERPL-MCNC: 0.55 MG/DL (ref 0.67–1.17)
EGFRCR SERPLBLD CKD-EPI 2021: >90 ML/MIN/1.73M2
ERYTHROCYTE [DISTWIDTH] IN BLOOD BY AUTOMATED COUNT: 12 % (ref 10–15)
GLUCOSE BLDC GLUCOMTR-MCNC: 113 MG/DL (ref 70–99)
GLUCOSE SERPL-MCNC: 108 MG/DL (ref 70–99)
HCO3 SERPL-SCNC: 24 MMOL/L (ref 22–29)
HCT VFR BLD AUTO: 35.5 % (ref 40–53)
HGB BLD-MCNC: 12.1 G/DL (ref 13.3–17.7)
MAGNESIUM SERPL-MCNC: 2.1 MG/DL (ref 1.7–2.3)
MCH RBC QN AUTO: 32.5 PG (ref 26.5–33)
MCHC RBC AUTO-ENTMCNC: 34.1 G/DL (ref 31.5–36.5)
MCV RBC AUTO: 95 FL (ref 78–100)
PHOSPHATE SERPL-MCNC: 2.5 MG/DL (ref 2.5–4.5)
PLATELET # BLD AUTO: 165 10E3/UL (ref 150–450)
POTASSIUM SERPL-SCNC: 3.4 MMOL/L (ref 3.4–5.3)
POTASSIUM SERPL-SCNC: 3.6 MMOL/L (ref 3.4–5.3)
POTASSIUM SERPL-SCNC: 3.6 MMOL/L (ref 3.4–5.3)
PROT SERPL-MCNC: 6.2 G/DL (ref 6.4–8.3)
RBC # BLD AUTO: 3.72 10E6/UL (ref 4.4–5.9)
SODIUM SERPL-SCNC: 141 MMOL/L (ref 135–145)
WBC # BLD AUTO: 8.3 10E3/UL (ref 4–11)

## 2024-07-28 PROCEDURE — 999N000157 HC STATISTIC RCP TIME EA 10 MIN

## 2024-07-28 PROCEDURE — 250N000009 HC RX 250: Performed by: INTERNAL MEDICINE

## 2024-07-28 PROCEDURE — 258N000003 HC RX IP 258 OP 636: Performed by: INTERNAL MEDICINE

## 2024-07-28 PROCEDURE — 99233 SBSQ HOSP IP/OBS HIGH 50: CPT | Performed by: STUDENT IN AN ORGANIZED HEALTH CARE EDUCATION/TRAINING PROGRAM

## 2024-07-28 PROCEDURE — 250N000009 HC RX 250: Performed by: SURGERY

## 2024-07-28 PROCEDURE — 250N000011 HC RX IP 250 OP 636: Performed by: STUDENT IN AN ORGANIZED HEALTH CARE EDUCATION/TRAINING PROGRAM

## 2024-07-28 PROCEDURE — 250N000011 HC RX IP 250 OP 636: Performed by: INTERNAL MEDICINE

## 2024-07-28 PROCEDURE — 82330 ASSAY OF CALCIUM: CPT | Performed by: INTERNAL MEDICINE

## 2024-07-28 PROCEDURE — 36415 COLL VENOUS BLD VENIPUNCTURE: CPT | Performed by: STUDENT IN AN ORGANIZED HEALTH CARE EDUCATION/TRAINING PROGRAM

## 2024-07-28 PROCEDURE — 999N000287 HC ICU ADULT ROUNDING, EACH 10 MINS

## 2024-07-28 PROCEDURE — 200N000001 HC R&B ICU

## 2024-07-28 PROCEDURE — 84100 ASSAY OF PHOSPHORUS: CPT | Performed by: SURGERY

## 2024-07-28 PROCEDURE — 80053 COMPREHEN METABOLIC PANEL: CPT | Performed by: STUDENT IN AN ORGANIZED HEALTH CARE EDUCATION/TRAINING PROGRAM

## 2024-07-28 PROCEDURE — 85027 COMPLETE CBC AUTOMATED: CPT | Performed by: INTERNAL MEDICINE

## 2024-07-28 PROCEDURE — 250N000011 HC RX IP 250 OP 636: Performed by: SURGERY

## 2024-07-28 PROCEDURE — 82040 ASSAY OF SERUM ALBUMIN: CPT | Performed by: INTERNAL MEDICINE

## 2024-07-28 PROCEDURE — 36415 COLL VENOUS BLD VENIPUNCTURE: CPT | Performed by: INTERNAL MEDICINE

## 2024-07-28 PROCEDURE — 84132 ASSAY OF SERUM POTASSIUM: CPT | Performed by: SURGERY

## 2024-07-28 PROCEDURE — 83735 ASSAY OF MAGNESIUM: CPT | Performed by: STUDENT IN AN ORGANIZED HEALTH CARE EDUCATION/TRAINING PROGRAM

## 2024-07-28 PROCEDURE — 250N000013 HC RX MED GY IP 250 OP 250 PS 637: Performed by: SURGERY

## 2024-07-28 RX ORDER — PANTOPRAZOLE SODIUM 40 MG/1
40 TABLET, DELAYED RELEASE ORAL 2 TIMES DAILY
Status: DISCONTINUED | OUTPATIENT
Start: 2024-07-28 | End: 2024-07-28

## 2024-07-28 RX ORDER — HYDROMORPHONE HCL IN WATER/PF 6 MG/30 ML
0.2 PATIENT CONTROLLED ANALGESIA SYRINGE INTRAVENOUS
Status: DISCONTINUED | OUTPATIENT
Start: 2024-07-28 | End: 2024-07-28

## 2024-07-28 RX ORDER — POTASSIUM CHLORIDE 7.45 MG/ML
10 INJECTION INTRAVENOUS
Status: COMPLETED | OUTPATIENT
Start: 2024-07-28 | End: 2024-07-28

## 2024-07-28 RX ORDER — DIAZEPAM 10 MG/2ML
5-10 INJECTION, SOLUTION INTRAMUSCULAR; INTRAVENOUS EVERY 30 MIN PRN
Status: DISCONTINUED | OUTPATIENT
Start: 2024-07-28 | End: 2024-07-29

## 2024-07-28 RX ORDER — HYDROMORPHONE HYDROCHLORIDE 1 MG/ML
0.2 INJECTION, SOLUTION INTRAMUSCULAR; INTRAVENOUS; SUBCUTANEOUS
Status: DISCONTINUED | OUTPATIENT
Start: 2024-07-28 | End: 2024-07-29

## 2024-07-28 RX ORDER — PANTOPRAZOLE SODIUM 40 MG/1
40 TABLET, DELAYED RELEASE ORAL
Status: DISCONTINUED | OUTPATIENT
Start: 2024-07-29 | End: 2024-08-01 | Stop reason: HOSPADM

## 2024-07-28 RX ORDER — FLUMAZENIL 0.1 MG/ML
0.2 INJECTION, SOLUTION INTRAVENOUS
Status: DISCONTINUED | OUTPATIENT
Start: 2024-07-28 | End: 2024-08-01 | Stop reason: HOSPADM

## 2024-07-28 RX ORDER — PHENOBARBITAL SODIUM 65 MG/ML
130 INJECTION, SOLUTION INTRAMUSCULAR; INTRAVENOUS ONCE
Status: COMPLETED | OUTPATIENT
Start: 2024-07-28 | End: 2024-07-28

## 2024-07-28 RX ORDER — GABAPENTIN 300 MG/1
1200 CAPSULE ORAL ONCE
Status: COMPLETED | OUTPATIENT
Start: 2024-07-28 | End: 2024-07-28

## 2024-07-28 RX ORDER — DIAZEPAM 10 MG
10 TABLET ORAL EVERY 30 MIN PRN
Status: DISCONTINUED | OUTPATIENT
Start: 2024-07-28 | End: 2024-08-01 | Stop reason: HOSPADM

## 2024-07-28 RX ADMIN — POTASSIUM CHLORIDE 10 MEQ: 7.46 INJECTION, SOLUTION INTRAVENOUS at 03:19

## 2024-07-28 RX ADMIN — METOPROLOL TARTRATE 5 MG: 5 INJECTION INTRAVENOUS at 01:48

## 2024-07-28 RX ADMIN — POTASSIUM CHLORIDE 10 MEQ: 7.46 INJECTION, SOLUTION INTRAVENOUS at 00:56

## 2024-07-28 RX ADMIN — MEROPENEM 1 G: 1 INJECTION, POWDER, FOR SOLUTION INTRAVENOUS at 17:40

## 2024-07-28 RX ADMIN — SODIUM CHLORIDE, POTASSIUM CHLORIDE, SODIUM LACTATE AND CALCIUM CHLORIDE: 600; 310; 30; 20 INJECTION, SOLUTION INTRAVENOUS at 00:59

## 2024-07-28 RX ADMIN — THIAMINE HYDROCHLORIDE 200 MG: 100 INJECTION, SOLUTION INTRAMUSCULAR; INTRAVENOUS at 13:44

## 2024-07-28 RX ADMIN — FUROSEMIDE 20 MG: 10 INJECTION, SOLUTION INTRAMUSCULAR; INTRAVENOUS at 20:19

## 2024-07-28 RX ADMIN — THIAMINE HYDROCHLORIDE 200 MG: 100 INJECTION, SOLUTION INTRAMUSCULAR; INTRAVENOUS at 20:19

## 2024-07-28 RX ADMIN — GABAPENTIN 900 MG: 300 CAPSULE ORAL at 17:13

## 2024-07-28 RX ADMIN — POTASSIUM CHLORIDE 10 MEQ: 7.46 INJECTION, SOLUTION INTRAVENOUS at 08:55

## 2024-07-28 RX ADMIN — DIAZEPAM 10 MG: 5 INJECTION INTRAMUSCULAR; INTRAVENOUS at 17:34

## 2024-07-28 RX ADMIN — POTASSIUM CHLORIDE 10 MEQ: 7.46 INJECTION, SOLUTION INTRAVENOUS at 05:02

## 2024-07-28 RX ADMIN — POTASSIUM CHLORIDE 10 MEQ: 7.46 INJECTION, SOLUTION INTRAVENOUS at 03:55

## 2024-07-28 RX ADMIN — PHENOBARBITAL SODIUM 130 MG: 65 INJECTION INTRAMUSCULAR; INTRAVENOUS at 03:15

## 2024-07-28 RX ADMIN — DEXMEDETOMIDINE HYDROCHLORIDE 0.6 MCG/KG/HR: 400 INJECTION INTRAVENOUS at 05:01

## 2024-07-28 RX ADMIN — PANTOPRAZOLE SODIUM 40 MG: 40 INJECTION, POWDER, FOR SOLUTION INTRAVENOUS at 08:20

## 2024-07-28 RX ADMIN — MEROPENEM 1 G: 1 INJECTION, POWDER, FOR SOLUTION INTRAVENOUS at 01:54

## 2024-07-28 RX ADMIN — THIAMINE HYDROCHLORIDE 200 MG: 100 INJECTION, SOLUTION INTRAMUSCULAR; INTRAVENOUS at 08:39

## 2024-07-28 RX ADMIN — DIAZEPAM 10 MG: 5 INJECTION INTRAMUSCULAR; INTRAVENOUS at 13:12

## 2024-07-28 RX ADMIN — MEROPENEM 1 G: 1 INJECTION, POWDER, FOR SOLUTION INTRAVENOUS at 11:06

## 2024-07-28 RX ADMIN — GABAPENTIN 1200 MG: 300 CAPSULE ORAL at 02:37

## 2024-07-28 RX ADMIN — DIAZEPAM 10 MG: 5 INJECTION INTRAMUSCULAR; INTRAVENOUS at 09:08

## 2024-07-28 RX ADMIN — HYDRALAZINE HYDROCHLORIDE 10 MG: 20 INJECTION INTRAMUSCULAR; INTRAVENOUS at 12:43

## 2024-07-28 RX ADMIN — ENOXAPARIN SODIUM 40 MG: 40 INJECTION SUBCUTANEOUS at 12:08

## 2024-07-28 RX ADMIN — HYDROMORPHONE HYDROCHLORIDE 0.2 MG: 1 INJECTION, SOLUTION INTRAMUSCULAR; INTRAVENOUS; SUBCUTANEOUS at 20:17

## 2024-07-28 RX ADMIN — FUROSEMIDE 20 MG: 10 INJECTION, SOLUTION INTRAMUSCULAR; INTRAVENOUS at 08:22

## 2024-07-28 RX ADMIN — POTASSIUM CHLORIDE 10 MEQ: 7.46 INJECTION, SOLUTION INTRAVENOUS at 09:58

## 2024-07-28 RX ADMIN — ONDANSETRON 4 MG: 2 INJECTION INTRAMUSCULAR; INTRAVENOUS at 23:32

## 2024-07-28 RX ADMIN — SODIUM PHOSPHATE, MONOBASIC, MONOHYDRATE AND SODIUM PHOSPHATE, DIBASIC, ANHYDROUS 9 MMOL: 142; 276 INJECTION, SOLUTION INTRAVENOUS at 08:56

## 2024-07-28 RX ADMIN — FOLIC ACID 1 MG: 5 INJECTION, SOLUTION INTRAMUSCULAR; INTRAVENOUS; SUBCUTANEOUS at 08:38

## 2024-07-28 RX ADMIN — HYDRALAZINE HYDROCHLORIDE 10 MG: 20 INJECTION INTRAMUSCULAR; INTRAVENOUS at 20:13

## 2024-07-28 RX ADMIN — CLONIDINE HYDROCHLORIDE 0.2 MG: 0.2 TABLET ORAL at 02:39

## 2024-07-28 ASSESSMENT — ACTIVITIES OF DAILY LIVING (ADL)
ADLS_ACUITY_SCORE: 41
ADLS_ACUITY_SCORE: 38
ADLS_ACUITY_SCORE: 41
ADLS_ACUITY_SCORE: 41
ADLS_ACUITY_SCORE: 37
ADLS_ACUITY_SCORE: 41
ADLS_ACUITY_SCORE: 41
ADLS_ACUITY_SCORE: 37
ADLS_ACUITY_SCORE: 41
ADLS_ACUITY_SCORE: 37
ADLS_ACUITY_SCORE: 41
ADLS_ACUITY_SCORE: 38
ADLS_ACUITY_SCORE: 41

## 2024-07-28 NOTE — PLAN OF CARE
Problem: Adult Inpatient Plan of Care  Goal: Readiness for Transition of Care  Outcome: Not Progressing     Problem: Alcohol Withdrawal  Goal: Alcohol Withdrawal Symptom Control  Outcome: Not Progressing  Intervention: Minimize or Manage Alcohol Withdrawal Symptoms  Recent Flowsheet Documentation  Taken 7/28/2024 0000 by Mehnaz Brennan RN  Sensory Stimulation Regulation:   auditory stimulation minimized   lighting decreased   quiet environment promoted   visual stimulation minimized   tactile stimulation minimized  Taken 7/27/2024 2000 by Mehnaz Brennan RN  Sensory Stimulation Regulation:   auditory stimulation minimized   lighting decreased   quiet environment promoted   visual stimulation minimized   tactile stimulation minimized  Goal: Optimal Neurologic Function  Outcome: Not Progressing  Intervention: Minimize or Manage Acute Neurologic Symptoms  Recent Flowsheet Documentation  Taken 7/28/2024 0000 by Mehnaz Brennan RN  Sensory Stimulation Regulation:   auditory stimulation minimized   lighting decreased   quiet environment promoted   visual stimulation minimized   tactile stimulation minimized  Taken 7/27/2024 2000 by Mehnaz Brennan RN  Sensory Stimulation Regulation:   auditory stimulation minimized   lighting decreased   quiet environment promoted   visual stimulation minimized   tactile stimulation minimized  Goal: Readiness for Change Identified  Outcome: Not Progressing     Problem: Comorbidity Management  Goal: Blood Pressure in Desired Range  Outcome: Not Progressing  Intervention: Maintain Blood Pressure Management  Recent Flowsheet Documentation  Taken 7/28/2024 0000 by Mehnaz Brennan RN  Medication Review/Management:   medications reviewed   dosing adjusted  Taken 7/27/2024 2000 by Mehnaz Brennan RN  Medication Review/Management:   medications reviewed   dosing adjusted     Problem: Risk for Delirium  Goal: Optimal Coping  Outcome: Not Progressing  Goal: Improved Behavioral Control  Outcome: Not  Progressing  Intervention: Minimize Safety Risk  Recent Flowsheet Documentation  Taken 7/28/2024 0000 by Mehnaz Brennan RN  Enhanced Safety Measures:  at bedside  Trust Relationship/Rapport:   care explained   emotional support provided  Taken 7/27/2024 2000 by Mehnaz Brennan RN  Enhanced Safety Measures:  at bedside  Trust Relationship/Rapport:   care explained   emotional support provided  Goal: Improved Attention and Thought Clarity  Outcome: Not Progressing  Intervention: Maximize Cognitive Function  Recent Flowsheet Documentation  Taken 7/28/2024 0000 by Mehnaz Brennna RN  Sensory Stimulation Regulation:   auditory stimulation minimized   lighting decreased   quiet environment promoted   visual stimulation minimized   tactile stimulation minimized  Reorientation Measures: reorientation provided  Taken 7/27/2024 2000 by Mehnaz Brennan RN  Sensory Stimulation Regulation:   auditory stimulation minimized   lighting decreased   quiet environment promoted   visual stimulation minimized   tactile stimulation minimized  Reorientation Measures: reorientation provided  Goal: Improved Sleep  Outcome: Not Progressing   Goal Outcome Evaluation:         [facility] - ICU    RN Progress Note:            Pertinent Assessments:      Please refer to flowsheet rows for full assessment     Patient confused and disoriented this shift. Agitated and frequently scoring high on the CIWA scale. PRN ativan given x3. Intensivist ordered other protocol meds and PRNs, Phenobarbital given x2 (instead of Ativan) per MD order. Consistently hypertensive. PRN hydralazine and metoprolol given. Denies pain. Potassium and Phosphorus replacement given.            Key Events - This Shift:       Managing agitation as per above.               Barriers to Discharge / Downgrade:     Remains on CIWA protocol with precedex drip and frequent administration of IV ativan.         Point of Contact Update  YES-OR-NO: No

## 2024-07-28 NOTE — PROGRESS NOTES
Called by bedside nurse. Pt SBP >180 despite hydralazine doses being given.     ICU CIWA protocol initiated  Hydralazine dose liberalized to q4 prn      If patient is able to swallow Gabapentin and clonidine, would be preferred.    Will consider Phenobarb 260mg IM x1 if continued agitation and unable to swallow pills.

## 2024-07-28 NOTE — PROGRESS NOTES
[facility] progress Note    Subjective: Patient sedated.  Overnight again he was quite agitated requiring multiple medications to treat the alcohol withdrawal symptoms.  He continues to be hypertensive requiring medications for that as well.  Has been taking some p.o. but no appetite when he is awake, therefore limited.    Discussed patient with RN and father.    Objective:  Vital signs in last 24 hours  Temp:  [98.1  F (36.7  C)-98.6  F (37  C)] 98.4  F (36.9  C)  Pulse:  [79-99] 80  Resp:  [15-43] 19  BP: (146-210)/() 176/99  SpO2:  [93 %-97 %] 93 %     Gen: Sedated, asleep, no acute distress  Pulmonary: Lungs clear to ausculation bilaterally  Cardiovascular: Regular  Gastrointestinal: Decreased bowel sounds, soft, non-tender, non-distended, no rebound or guarding.  No Abilio sign.    Assessment: 1.  Alcohol withdrawal-continues with multimodality treatment.  Still difficult.    2.  Severe necrotizing pancreatitis-previously had fever and was empirically started on meropenem.  Continues on lactated Ringer's, that has been decreased to 50 cc/h.    Plan: Continue with IV fluids, ADAT to low-fat diet.  Continue meropenem for now, if cultures negative at 1 week, then discontinue.  Potential repeat CT imaging of the pancreas soon.    Patient Active Problem List   Diagnosis    Cervical radiculopathy    Accelerated hypertension    Hypokalemia    Hyponatremia    Acute kidney injury (H24)    Alcohol withdrawal syndrome with complication (H)    Alcohol-induced acute pancreatitis without infection or necrosis    Hypomagnesemia    Alcoholic hepatitis without ascites (H28)       Labs:  CMP Results:   Recent Labs   Lab Test 07/28/24  0711      POTASSIUM 3.6  3.6   CHLORIDE 105   CO2 24   ANIONGAP 12   *   BUN 11.9   CR 0.55*   BILITOTAL 0.8   ALKPHOS 111   ALT 35   AST 35      CBC  Recent Labs   Lab 07/28/24  0711 07/27/24  0419 07/26/24  0417 07/25/24  0453   WBC 8.3 9.9 7.1 5.7   RBC 3.72* 3.81*  3.82* 4.45   HGB 12.1* 12.4* 12.2* 14.2   HCT 35.5* 36.0* 37.0* 42.0   MCV 95 95 97 94   MCH 32.5 32.5 31.9 31.9   MCHC 34.1 34.4 33.0 33.8   RDW 12.0 12.1 12.8 12.9    142* 93* 104*     INRNo lab results found in last 7 days.   Lipase   Date Value Ref Range Status   07/24/2024 1,425 (H) 13 - 60 U/L Final   07/24/2024 1,207 (H) 13 - 60 U/L Final   04/10/2024 117 (H) 13 - 60 U/L Final   02/23/2022 16 0 - 52 U/L Final   10/05/2021 25 0 - 52 U/L Final     Recent Labs   Lab 07/28/24  0711 07/27/24  0419 07/25/24  0453 07/24/24  0801 07/24/24  0003   AST 35 46* 84* 200* 274*   ALT 35 44 90* 163* 203*   ALKPHOS 111 81 58 118 138   BILITOTAL 0.8 1.1 1.1 0.6 0.5   LIPASE  --   --   --  1,425* 1,207*       Imaging: CT Chest Pulmonary Embolism w Contrast    Result Date: 7/26/2024  EXAM: CT CHEST PULMONARY EMBOLISM W CONTRAST LOCATION: [facility] DATE: 7/26/2024 INDICATION: To r/o PE with tachycardia and hypoxia. COMPARISON: None. TECHNIQUE: CT chest pulmonary angiogram during arterial phase injection of IV contrast. Multiplanar reformats and MIP reconstructions were performed. Dose reduction techniques were used. CONTRAST: 90 mL Isovue 370 FINDINGS: ANGIOGRAM CHEST: Pulmonary arteries are normal caliber and negative for pulmonary emboli. Thoracic aorta is negative for dissection. No CT evidence of right heart strain. LUNGS AND PLEURA: Mild-to-moderate-sized bilateral pleural effusions with moderate atelectasis in the lower lobes with no central airway obstruction. There is some mild nonspecific mosaic perfusion seen in both lungs which can be associated with changes of air trapping secondary to small airway inflammation. MEDIASTINUM/AXILLAE: Normal. CORONARY ARTERY CALCIFICATION: Advanced. UPPER ABDOMEN: Increased density in the posterior aspect of the proximal stomach with Hounsfield units at approximately 392. The contrast-filled abdominal aorta shows average Hounsfield units at 180 and the  pulmonary arteries at 273, thus this would be unlikely to represent changes of active GI tract bleeding and I would favor changes of medication. If there is anything clinically to suggest active GI tract bleeding in the stomach, then a GI tract Nuclear Medicine bleeding scan or CTA of the GI tract could be performed for further evaluation. Mild ascites. Significant fatty infiltration of the liver. Increased density of the gallbladder presumed to represent prior contrast. Stranding of the fat, most marked in the general vicinity of the pancreas raises the possibility for acute pancreatitis. Mild bilateral gynecomastia. MUSCULOSKELETAL: Prior postoperative changes of the cervical spine.     IMPRESSION: 1.  No PE, dissection, or aneurysm. 2.  Increased density in the proximal stomach and I would favor changes of medication rather than active GI tract bleeding. Please refer to above report for details. 3.  Mild-to-moderate-sized bilateral pleural effusions with moderate atelectasis in the lower lobes with no central airway obstruction. 4.  Mild ascites. 5.  Stranding of the fat in the abdomen most marked in the general region of the pancreas which raises the possibility for acute pancreatitis. 6.  Advanced coronary artery calcification.    XR Chest Port 1 View    Result Date: 7/25/2024  EXAM: XR CHEST PORT 1 VIEW LOCATION: [facility] DATE: 7/25/2024 INDICATION: Emesis. Assess for pneumonia. COMPARISON: 2/23/2022     IMPRESSION: Heart size magnified in AP projection with normal vascularity. Shallow inspiration accentuates bibasilar subsegmental atelectasis. No pneumothorax nor pleural effusion.      The total time spent with this patient was 25 minutes.                                                Heriberto Soto MD  Thank you for the opportunity to participate in the care of this patient.   Please feel free to call me with any questions or concerns.  Phone number [phone].

## 2024-07-28 NOTE — PLAN OF CARE
Goal Outcome Evaluation:       [facility] - ICU    RN Progress Note:Patient has been sedated due to precedex and prn valium per etoh protocol.  Precedex currently on hold to evaluate cognitive status.  Patient has scored >20 x2 and valium given.  Blood pressure remains elevated, prn hydralazine given.  Nursing assistant at bedside in place.  Mancia catheter placed today, had multiple incontinence episodes even with primofit in place.  Significant other has visited today.  Supportive care given to patient and family.            Pertinent Assessments:      Please refer to flowsheet rows for full assessment     Precedex to off, valium given for ciwa score>20.  Will re evaluate.  Blood pressure elevated, prn hydralzine given.           Key Events - This Shift:       Continues with alcohol withdrawals, medicated per orders.        MIREYA SAT (Sedation Awakening Trial): For use ONLY if intubated             Barriers to Discharge / Downgrade:     Medications needed for Withdrawals.

## 2024-07-28 NOTE — PROGRESS NOTES
[facility]  Critical Care Progress Note     Chandler Branch 43 yo M PMH alcohol use disorder, primary HTN, CT imaging evidence of CAD, GERD    Presented 7/23/24 for nausea, vomiting, & abdominal pain. Found to have alcohol withdrawal syndrome, acute alcoholic hepatitis, & pancreatitis with imaging evidence of hypo-enhancement concerning for necrosis without sustained systemic inflammation or end-organ injury       Interval Events     Given phenobarb 260 followed by 130 mg overnight for ongoing autonomic hyperactivity & agitation attributed to withdrawal. Minimal supplemental O2 & hypertension with normal HR on dexmedetomidine at 0.6 & clonidine. Good UO & net -4.4 L yesterday (given lasix 20 X2 yesterday). Stable electrolytes this morning.     Lior pain no nausea or vomiting     [place] - 1) Switch from lorazepam to diazepam; 2) favor benzodiazepines with weaning of dexmedetomidine to off; 3) do not see a strong indication for meropenem but will defer to GI regarding antimicrobials; 4) he may benefit from diuresis, recognize ongoing LR administration (?)      Assessment & Plan      Goals of Care:  Life prolonging without limits      Neurology, Psychiatry, Sedation, Analgesia:  Alcohol withdrawal   - CIWA-diazepam, received phenobarbital 260 & 130 on 7/27-7/28  - thiamine & folate   - wean dexmedetomidine to off     Cardiovascular:  Primary HTN  - hold pta lisinopril-hydrochlorothiazide  - prn hydralazine  - clonidine as above     Autonomic hyperactivity   Sequelae of alcohol withdrawal    CT imaging evidence of coronary artery disease     Respiratory, Airway:  Acute hypoxemic respiratory insufficiency, atelectasis   Suspected sequelae of hypervolemia due to resuscitation. Possible underlying small airway disease. 7/26/24 CT PE demonstrated mild-moderate bilateral pleural effusions with adjacent atelectasis, mosaic attenuation, no PE  - pulmonary hygiene: IS Q2H while awake, out of bed to  chair as able   - gentle diuresis, as below      Gastrointestinal:  Severe acute pancreatitis, alcohol induced   Severity based on imaging evidence concerning for necrosis. Nausea, vomiting, abdominal pain, lipase 1,425, & 7/24 CT A/P with IV contrast demonstration of peripancreatic fat stranding, fluid, & hypo-enhancement of the pancreatic head. TG's low; no signs of stones on CT. No sustained SIRS, no significant end organ dysfunction. BISAP score 2. Corwin score likely low, no LDH sought.   - received early fluid resuscitation  - early nutrition inhibited by mental status & intolerance to NGT placement   - don't see a strong role for empiric antimicrobials, will defer to GI     Acute alcoholic hepatitis     GERD  - pta omeprazole     Renal, Acid-base, Electrolytes, Volume:  Normal renal indices     Hypervolemia   Edematous, several liters + fluid balance, pleural effusions, & CT imaging evidence concerning for edema   - lasix 20 mg bid   - remains on LR 50 ml/h    Infectious Disease:  Systemic inflammation vs sepsis   Exhibited leukocytosis on 7/24 & fevers 7/25-7/26. No hypotension. Autonomic hyperactivity with tachycardia in the setting of alcohol withdrawal. 7/25 procal 0.69. 7/24 CT with IV contrast demonstration of peripancreatic fat stranding, fluid, & hypo-enhancement of the pancreatic head without gas to suggested infected necrosis   - meropenem (7/25 - current) per GI, consider de-escalation     Hematology, Oncology:  Normocytic anemia - stable     Endocrine:  Euglycemic     Checklist:  FEN: Low fat diet per GI  VTE ppx: enoxaparin   GI ppx: pta omeprazole   Bowel regimen: PRN senna   Lines/tube-size: PIVs, espinal 7/28   Skin: no lesions    PT/OT/SLP, early mobility: not consulted   Code Status: full       Physical Exam      Neurologic: Sedated. Does not open eyes, track, or follow commands. Spontaneous movement throughout.  HEENT: Head and face normal. No nasal discharge. Oropharynx normal. Eyelids,  conjunctiva, & sclera normal.   Neck: Neck appearance normal. No neck masses. Thyroid not enlarged.  Respiratory: Lungs clear bilaterally. No wheezes, crackles, or rhonchi.   Cardiovascular: Regular rate & rhythm  Normal S1 & S2. No murmurs  Gastrointestinal: Bowel sounds present. No tenderness  Musculoskeletal: Skeletal configuration normal and muscle mass normal for age. Joint appearance overall normal.  Skin, Hair, & Nails: Skin color normal. No skin lesions.  Hair & nails normal.  Extremities: 2+ lower extremity pitting edema      All pertinent vital signs, ventilator settings, I&Os, laboratory, microbiology, ECGs, & imaging data has been personally reviewed. Total subsequent encounter time, excluding procedures, was 50 minutes     CIERRA Gaxiola MD  Critical Care Medicine

## 2024-07-29 LAB
ANION GAP SERPL CALCULATED.3IONS-SCNC: 12 MMOL/L (ref 7–15)
BUN SERPL-MCNC: 8.7 MG/DL (ref 6–20)
CALCIUM SERPL-MCNC: 8.7 MG/DL (ref 8.8–10.4)
CHLORIDE SERPL-SCNC: 99 MMOL/L (ref 98–107)
CREAT SERPL-MCNC: 0.57 MG/DL (ref 0.67–1.17)
EGFRCR SERPLBLD CKD-EPI 2021: >90 ML/MIN/1.73M2
ERYTHROCYTE [DISTWIDTH] IN BLOOD BY AUTOMATED COUNT: 12.3 % (ref 10–15)
GLUCOSE BLDC GLUCOMTR-MCNC: 113 MG/DL (ref 70–99)
GLUCOSE BLDC GLUCOMTR-MCNC: 130 MG/DL (ref 70–99)
GLUCOSE BLDC GLUCOMTR-MCNC: 136 MG/DL (ref 70–99)
GLUCOSE SERPL-MCNC: 141 MG/DL (ref 70–99)
HBA1C MFR BLD: 5.7 %
HCO3 SERPL-SCNC: 26 MMOL/L (ref 22–29)
HCT VFR BLD AUTO: 39.9 % (ref 40–53)
HGB BLD-MCNC: 13.4 G/DL (ref 13.3–17.7)
MAGNESIUM SERPL-MCNC: 1.6 MG/DL (ref 1.7–2.3)
MAGNESIUM SERPL-MCNC: 2 MG/DL (ref 1.7–2.3)
MCH RBC QN AUTO: 31.8 PG (ref 26.5–33)
MCHC RBC AUTO-ENTMCNC: 33.6 G/DL (ref 31.5–36.5)
MCV RBC AUTO: 95 FL (ref 78–100)
PHOSPHATE SERPL-MCNC: 2.7 MG/DL (ref 2.5–4.5)
PHOSPHATE SERPL-MCNC: 3.3 MG/DL (ref 2.5–4.5)
PLATELET # BLD AUTO: 227 10E3/UL (ref 150–450)
POTASSIUM SERPL-SCNC: 2.9 MMOL/L (ref 3.4–5.3)
POTASSIUM SERPL-SCNC: 2.9 MMOL/L (ref 3.4–5.3)
POTASSIUM SERPL-SCNC: 3.1 MMOL/L (ref 3.4–5.3)
POTASSIUM SERPL-SCNC: 3.6 MMOL/L (ref 3.4–5.3)
PROCALCITONIN SERPL IA-MCNC: 0.5 NG/ML
RBC # BLD AUTO: 4.22 10E6/UL (ref 4.4–5.9)
SODIUM SERPL-SCNC: 137 MMOL/L (ref 135–145)
WBC # BLD AUTO: 8.9 10E3/UL (ref 4–11)

## 2024-07-29 PROCEDURE — 250N000011 HC RX IP 250 OP 636: Performed by: INTERNAL MEDICINE

## 2024-07-29 PROCEDURE — 250N000011 HC RX IP 250 OP 636: Performed by: SURGERY

## 2024-07-29 PROCEDURE — 250N000013 HC RX MED GY IP 250 OP 250 PS 637: Performed by: INTERNAL MEDICINE

## 2024-07-29 PROCEDURE — 83735 ASSAY OF MAGNESIUM: CPT | Performed by: STUDENT IN AN ORGANIZED HEALTH CARE EDUCATION/TRAINING PROGRAM

## 2024-07-29 PROCEDURE — 84145 PROCALCITONIN (PCT): CPT | Performed by: STUDENT IN AN ORGANIZED HEALTH CARE EDUCATION/TRAINING PROGRAM

## 2024-07-29 PROCEDURE — 80048 BASIC METABOLIC PNL TOTAL CA: CPT | Performed by: INTERNAL MEDICINE

## 2024-07-29 PROCEDURE — 250N000013 HC RX MED GY IP 250 OP 250 PS 637: Performed by: STUDENT IN AN ORGANIZED HEALTH CARE EDUCATION/TRAINING PROGRAM

## 2024-07-29 PROCEDURE — 99233 SBSQ HOSP IP/OBS HIGH 50: CPT | Performed by: STUDENT IN AN ORGANIZED HEALTH CARE EDUCATION/TRAINING PROGRAM

## 2024-07-29 PROCEDURE — 250N000011 HC RX IP 250 OP 636: Performed by: STUDENT IN AN ORGANIZED HEALTH CARE EDUCATION/TRAINING PROGRAM

## 2024-07-29 PROCEDURE — 83036 HEMOGLOBIN GLYCOSYLATED A1C: CPT | Performed by: STUDENT IN AN ORGANIZED HEALTH CARE EDUCATION/TRAINING PROGRAM

## 2024-07-29 PROCEDURE — 250N000013 HC RX MED GY IP 250 OP 250 PS 637: Performed by: SURGERY

## 2024-07-29 PROCEDURE — 84132 ASSAY OF SERUM POTASSIUM: CPT | Performed by: STUDENT IN AN ORGANIZED HEALTH CARE EDUCATION/TRAINING PROGRAM

## 2024-07-29 PROCEDURE — 83735 ASSAY OF MAGNESIUM: CPT | Performed by: INTERNAL MEDICINE

## 2024-07-29 PROCEDURE — 84100 ASSAY OF PHOSPHORUS: CPT | Performed by: INTERNAL MEDICINE

## 2024-07-29 PROCEDURE — 36415 COLL VENOUS BLD VENIPUNCTURE: CPT | Performed by: INTERNAL MEDICINE

## 2024-07-29 PROCEDURE — 82374 ASSAY BLOOD CARBON DIOXIDE: CPT | Performed by: STUDENT IN AN ORGANIZED HEALTH CARE EDUCATION/TRAINING PROGRAM

## 2024-07-29 PROCEDURE — 85041 AUTOMATED RBC COUNT: CPT | Performed by: STUDENT IN AN ORGANIZED HEALTH CARE EDUCATION/TRAINING PROGRAM

## 2024-07-29 PROCEDURE — 84100 ASSAY OF PHOSPHORUS: CPT | Performed by: STUDENT IN AN ORGANIZED HEALTH CARE EDUCATION/TRAINING PROGRAM

## 2024-07-29 PROCEDURE — 36415 COLL VENOUS BLD VENIPUNCTURE: CPT | Performed by: STUDENT IN AN ORGANIZED HEALTH CARE EDUCATION/TRAINING PROGRAM

## 2024-07-29 PROCEDURE — 120N000004 HC R&B MS OVERFLOW

## 2024-07-29 RX ORDER — LABETALOL HYDROCHLORIDE 5 MG/ML
10 INJECTION, SOLUTION INTRAVENOUS EVERY 4 HOURS PRN
Status: DISCONTINUED | OUTPATIENT
Start: 2024-07-29 | End: 2024-08-01 | Stop reason: HOSPADM

## 2024-07-29 RX ORDER — POTASSIUM CHLORIDE 7.45 MG/ML
10 INJECTION INTRAVENOUS
Status: DISCONTINUED | OUTPATIENT
Start: 2024-07-29 | End: 2024-07-29

## 2024-07-29 RX ORDER — HYDROMORPHONE HYDROCHLORIDE 1 MG/ML
0.3 INJECTION, SOLUTION INTRAMUSCULAR; INTRAVENOUS; SUBCUTANEOUS
Status: DISCONTINUED | OUTPATIENT
Start: 2024-07-29 | End: 2024-08-01

## 2024-07-29 RX ORDER — POTASSIUM CHLORIDE 1500 MG/1
40 TABLET, EXTENDED RELEASE ORAL ONCE
Status: COMPLETED | OUTPATIENT
Start: 2024-07-29 | End: 2024-07-29

## 2024-07-29 RX ORDER — POTASSIUM CHLORIDE 1500 MG/1
20 TABLET, EXTENDED RELEASE ORAL ONCE
Status: COMPLETED | OUTPATIENT
Start: 2024-07-29 | End: 2024-07-29

## 2024-07-29 RX ORDER — MAGNESIUM SULFATE HEPTAHYDRATE 40 MG/ML
2 INJECTION, SOLUTION INTRAVENOUS ONCE
Status: COMPLETED | OUTPATIENT
Start: 2024-07-29 | End: 2024-07-29

## 2024-07-29 RX ADMIN — HYDROMORPHONE HYDROCHLORIDE 0.2 MG: 1 INJECTION, SOLUTION INTRAMUSCULAR; INTRAVENOUS; SUBCUTANEOUS at 08:57

## 2024-07-29 RX ADMIN — HYDROMORPHONE HYDROCHLORIDE 0.3 MG: 1 INJECTION, SOLUTION INTRAMUSCULAR; INTRAVENOUS; SUBCUTANEOUS at 21:02

## 2024-07-29 RX ADMIN — POTASSIUM CHLORIDE 40 MEQ: 1500 TABLET, EXTENDED RELEASE ORAL at 08:41

## 2024-07-29 RX ADMIN — HYDROMORPHONE HYDROCHLORIDE 0.3 MG: 1 INJECTION, SOLUTION INTRAMUSCULAR; INTRAVENOUS; SUBCUTANEOUS at 15:42

## 2024-07-29 RX ADMIN — POTASSIUM CHLORIDE 20 MEQ: 1500 TABLET, EXTENDED RELEASE ORAL at 11:17

## 2024-07-29 RX ADMIN — MEROPENEM 1 G: 1 INJECTION, POWDER, FOR SOLUTION INTRAVENOUS at 18:19

## 2024-07-29 RX ADMIN — THIAMINE HYDROCHLORIDE 200 MG: 100 INJECTION, SOLUTION INTRAMUSCULAR; INTRAVENOUS at 14:08

## 2024-07-29 RX ADMIN — SENNOSIDES AND DOCUSATE SODIUM 2 TABLET: 8.6; 5 TABLET ORAL at 02:00

## 2024-07-29 RX ADMIN — ONDANSETRON 4 MG: 2 INJECTION INTRAMUSCULAR; INTRAVENOUS at 21:02

## 2024-07-29 RX ADMIN — HYDRALAZINE HYDROCHLORIDE 10 MG: 20 INJECTION INTRAMUSCULAR; INTRAVENOUS at 10:34

## 2024-07-29 RX ADMIN — MEROPENEM 1 G: 1 INJECTION, POWDER, FOR SOLUTION INTRAVENOUS at 01:16

## 2024-07-29 RX ADMIN — Medication 1 TABLET: at 10:11

## 2024-07-29 RX ADMIN — POTASSIUM CHLORIDE 10 MEQ: 7.46 INJECTION, SOLUTION INTRAVENOUS at 07:14

## 2024-07-29 RX ADMIN — HYDROMORPHONE HYDROCHLORIDE 0.2 MG: 1 INJECTION, SOLUTION INTRAMUSCULAR; INTRAVENOUS; SUBCUTANEOUS at 00:41

## 2024-07-29 RX ADMIN — THIAMINE HCL TAB 100 MG 100 MG: 100 TAB at 21:01

## 2024-07-29 RX ADMIN — MAGNESIUM SULFATE HEPTAHYDRATE 2 G: 40 INJECTION, SOLUTION INTRAVENOUS at 06:44

## 2024-07-29 RX ADMIN — ENOXAPARIN SODIUM 40 MG: 40 INJECTION SUBCUTANEOUS at 14:08

## 2024-07-29 RX ADMIN — POTASSIUM & SODIUM PHOSPHATES POWDER PACK 280-160-250 MG 1 PACKET: 280-160-250 PACK at 10:24

## 2024-07-29 RX ADMIN — ONDANSETRON 4 MG: 2 INJECTION INTRAMUSCULAR; INTRAVENOUS at 05:12

## 2024-07-29 RX ADMIN — LABETALOL HYDROCHLORIDE 10 MG: 5 INJECTION, SOLUTION INTRAVENOUS at 19:08

## 2024-07-29 RX ADMIN — HYDROMORPHONE HYDROCHLORIDE 0.3 MG: 1 INJECTION, SOLUTION INTRAMUSCULAR; INTRAVENOUS; SUBCUTANEOUS at 11:18

## 2024-07-29 RX ADMIN — POTASSIUM CHLORIDE 40 MEQ: 1500 TABLET, EXTENDED RELEASE ORAL at 18:20

## 2024-07-29 RX ADMIN — PANTOPRAZOLE SODIUM 40 MG: 40 TABLET, DELAYED RELEASE ORAL at 06:44

## 2024-07-29 RX ADMIN — FOLIC ACID 1 MG: 5 INJECTION, SOLUTION INTRAMUSCULAR; INTRAVENOUS; SUBCUTANEOUS at 10:09

## 2024-07-29 RX ADMIN — MEROPENEM 1 G: 1 INJECTION, POWDER, FOR SOLUTION INTRAVENOUS at 10:11

## 2024-07-29 RX ADMIN — ONDANSETRON 4 MG: 2 INJECTION INTRAMUSCULAR; INTRAVENOUS at 11:29

## 2024-07-29 RX ADMIN — HYDRALAZINE HYDROCHLORIDE 10 MG: 20 INJECTION INTRAMUSCULAR; INTRAVENOUS at 00:41

## 2024-07-29 RX ADMIN — LABETALOL HYDROCHLORIDE 10 MG: 5 INJECTION, SOLUTION INTRAVENOUS at 11:18

## 2024-07-29 RX ADMIN — THIAMINE HYDROCHLORIDE 200 MG: 100 INJECTION, SOLUTION INTRAMUSCULAR; INTRAVENOUS at 10:10

## 2024-07-29 RX ADMIN — POTASSIUM & SODIUM PHOSPHATES POWDER PACK 280-160-250 MG 1 PACKET: 280-160-250 PACK at 14:07

## 2024-07-29 ASSESSMENT — ACTIVITIES OF DAILY LIVING (ADL)
ADLS_ACUITY_SCORE: 36
ADLS_ACUITY_SCORE: 32
ADLS_ACUITY_SCORE: 36
ADLS_ACUITY_SCORE: 34
ADLS_ACUITY_SCORE: 36
ADLS_ACUITY_SCORE: 34
ADLS_ACUITY_SCORE: 36
ADLS_ACUITY_SCORE: 34
ADLS_ACUITY_SCORE: 36
ADLS_ACUITY_SCORE: 37
ADLS_ACUITY_SCORE: 36
ADLS_ACUITY_SCORE: 34
ADLS_ACUITY_SCORE: 36

## 2024-07-29 NOTE — PROGRESS NOTES
[facility]  Critical Care Progress Note     Chandler Branch 45 yo M PMH alcohol use disorder, primary HTN, CT imaging evidence of CAD, GERD    Presented 7/23/24 for nausea, vomiting, & abdominal pain. Found to have alcohol withdrawal syndrome, acute alcoholic hepatitis, & pancreatitis with imaging evidence of hypo-enhancement concerning for necrosis without significant end-organ injury       Interval Events     No events overnight. Improved wakefulness & orientation. Fever yesterday. Sinus tachycardia & moderate HTN. Good UO. BMP stable renal indices. Hypo-K & Mg getting replenished. CBC stable. Procal 0.5 (previously 0.69)     Significant abdominal pain, reports little appetite     Can tolerate oral meds      [place] - 1) Downgrade & engage hospital medicine; 2) evaluate renal indices & WC, consider holding IVF & diuresis; 4) remove espinal       Assessment & Plan      Goals of Care:  Life prolonging without limits      Neurology, Psychiatry, Sedation, Analgesia:  Alcohol withdrawal   - CIWA-diazepam, received phenobarbital 260 & 130 on 7/27-7/28  - thiamine & folate     Cardiovascular:  Primary HTN  - hold pta lisinopril-hydrochlorothiazide  - prn hydralazine & labetalol     Autonomic hyperactivity   Sequelae of alcohol withdrawal    CT imaging evidence of coronary artery disease     Respiratory, Airway:  Acute hypoxemic respiratory insufficiency, atelectasis   Suspected sequelae of hypervolemia due to resuscitation. Possible underlying small airway disease. 7/26/24 CT PE demonstrated mild-moderate bilateral pleural effusions with adjacent atelectasis, mosaic attenuation, no PE  - pulmonary hygiene: IS Q2H while awake, out of bed to chair as able     Gastrointestinal:  Severe acute pancreatitis, alcohol induced   Severity based on imaging evidence concerning for necrosis. Nausea, vomiting, abdominal pain, lipase 1,425, & 7/24 CT A/P with IV contrast demonstration of peripancreatic fat  stranding, fluid, & hypo-enhancement of the pancreatic head. TG's low; no signs of stones on CT. No significant end organ dysfunction. BISAP score 2. Corwin score likely low, no LDH sought.   - received early fluid resuscitation  - early nutrition inhibited by mental status & intolerance to NGT placement     Acute alcoholic hepatitis     GERD  - pta omeprazole     Renal, Acid-base, Electrolytes, Volume:  Normal renal indices     Hypervolemia   Edematous, several liters + fluid balance, pleural effusions, & CT imaging evidence concerning for edema   - previously diuresed, will hold diuresis & IVF    Infectious Disease:  Systemic inflammation vs sepsis   Exhibited leukocytosis on 7/24 & fevers 7/25-7/26. No hypotension. Autonomic hyperactivity with tachycardia in the setting of alcohol withdrawal. 7/25 procal 0.69. 7/24 CT with IV contrast demonstration of peripancreatic fat stranding, fluid, & hypo-enhancement of the pancreatic head without gas to suggested infected necrosis   - meropenem (7/25 - current)      Hematology, Oncology:  Normocytic anemia - stable     Endocrine:  Euglycemic     Checklist:  FEN: Low fat diet per GI  VTE ppx: enoxaparin   GI ppx: pta omeprazole   Bowel regimen: PRN senna   Lines/tube-size: PIVs, remove espinal 7/28    Skin: no lesions    PT/OT/SLP, early mobility: not consulted   Code Status: full       Physical Exam      Neurologic: Off sedation. Alert. Oriented. 5/5 strength throughout. .  HEENT: Head and face normal. No nasal discharge. Oropharynx normal. Eyelids, conjunctiva, & sclera normal.   Neck: Neck appearance normal. No neck masses. Thyroid not enlarged.  Respiratory: Lungs clear bilaterally. No wheezes, crackles, or rhonchi.   Cardiovascular: Tachycardic & regular  Normal S1 & S2. No murmurs  Gastrointestinal: Bowel sounds present. No tenderness  Musculoskeletal: Skeletal configuration normal and muscle mass normal for age. Joint appearance overall normal.  Skin, Hair, & Nails:  Skin color normal. No skin lesions.  Hair & nails normal.  Extremities: No lower extremity pitting edema      All pertinent vital signs, ventilator settings, I&Os, laboratory, microbiology, ECGs, & imaging data has been personally reviewed. Total subsequent encounter time, excluding procedures, was 50 minutes     CIERRA Gaxiola MD  Critical Care Medicine

## 2024-07-29 NOTE — PROGRESS NOTES
[facility] Progress Note     IMPRESSION:  43 yo M PMH alcohol use disorder, primary HTN, CT imaging evidence of CAD, GERD     Presented 7/23/24 for nausea, vomiting, & abdominal pain. Found to have alcohol withdrawal syndrome, acute alcoholic hepatitis, & pancreatitis with imaging evidence of hypo-enhancement concerning for necrosis without sustained systemic inflammation or end-organ injury.    # Alcohol withdrawal  - On CIWA protocol, Thiamine, and folate    # Severe necrotizing pancreatitis  - CT with acute pancreatitis and prominent hypoenhancement at head and uncinate process, possibly could represent necrotizing pancreatitis, wall thickened duodenum and hepatic flexure of colon, borderline left retroperitoneal lymph nodes  - empirically started on meropenem, on LR 50cc/hr currently       RECOMMENDATIONS:  - Diet: low-fat diet    - Repeat cultures at 1 week, if negative, likely can stop meropenem     - Continue LR 50cc/hr (may consider increasing this to 75cc-100cc/hr if his PO intake remains low and I/O is with net losses)     - Consider repeat CT if pains worsen, otherwise plan to repeat CT in a few weeks to reassess pancreatitis.  *Addendum: Discussed with attending GI MD, will plan to repeat CT scan at this time given elevated BP, HR, fever yesterday.      15 minutes of total time was spent providing patient care, including patient evaluation, reviewing documentation/test results, and     Hugo Schmidt PA-C  [facility] [address]  [phone]  ________________________________________________________________________      SUBJECTIVE:    Tachycardic and hypertensive with pulse 120s and BP up to 190s/140s.  He continues with abdominal pains. No nausea/vomiting but appetite is low. He is only able to tolerate light foods/fruit. He is passing gas. No BM's yet.        OBJECTIVE:  BP (!) 149/93   Pulse (!) 124   Temp 99.5  F (37.5  C) (Oral)   Resp 22   Ht 1.829 m (6')   Wt 93 kg  (205 lb 1.6 oz)   SpO2 93%   BMI 27.82 kg/m    Temp (24hrs), Av.4  F (37.4  C), Min:98.2  F (36.8  C), Max:101.5  F (38.6  C)    No data found.    Intake/Output Summary (Last 24 hours) at 2024 0904  Last data filed at 2024 0600  Gross per 24 hour   Intake 2650.05 ml   Output 5580 ml   Net -2929.95 ml        PHYSICAL EXAM  GEN: Alert, oriented x3, communicative and in NAD.    LYMPH: No LAD noted.  HRT: RRR  LUNGS: CTA  ABD:  ND, +BS, + pain to palpation, no rebound, no HSM.  SKIN: No rash, jaundice or spider angiomata      LABS:  I have reviewed the patient's new clinical lab results:     Recent Labs   Lab Test 24  0711 24   WBC 8.3 9.9 7.1   HGB 12.1* 12.4* 12.2*   MCV 95 95 97    142* 93*     Recent Labs   Lab Test 24  0414 24  0711 24  0000 24  1352 24  0419 24  0417   POTASSIUM 2.9* 3.6  3.6 3.4   < > 3.3* 3.6   CHLORIDE  --  105  --   --  98 100   CO2  --  24  --   --  21* 27   BUN  --  11.9  --   --  10.6 16.8   CR  --  0.55*  --   --  0.61* 0.85   ANIONGAP  --  12  --   --  12 6*    < > = values in this interval not displayed.     Recent Labs   Lab Test 24  0711 24  0419 24  1025 24  0453 24  0801 24  0003 04/10/24  1556 22  0637 22  1335   ALBUMIN 2.9* 2.8*  --  3.4* 4.2 4.5 4.7   < >  --    BILITOTAL 0.8 1.1  --  1.1 0.6 0.5 0.4   < >  --    ALT 35 44  --  90* 163* 203* 176*   < >  --    AST 35 46*  --  84* 200* 274* 272*   < >  --    PROTEIN  --   --  50*  --   --   --   --   --  Negative   LIPASE  --   --   --   --  1,425* 1,207* 117*   < >  --     < > = values in this interval not displayed.         IMAGING:  Reviewed

## 2024-07-29 NOTE — PLAN OF CARE
"[facility] - ICU    RN Progress Note:            Pertinent Assessments:      Please refer to flowsheet rows for full assessment     Patient CIWA score is 2,  Patient still tachycardia, tachypnea is relieved  Patient is Improving, he is oriented x 4, Nor tremors, no hand shaking.  Blood pressor controlled by hydralazine x 2, Pain managed by Dilaudid x 2 as PRN orders   Patient is calm.                 Key Events - This Shift:     Patient's BP is high and managed by Hydralazine twice  Still has abdominal pain             Barriers to Discharge / Downgrade:     Might be downgraded as, CIWA score 2, BP controlled.         Point of Contact Update YES-OR-NO: Yes  If No, reason:  Name:Kassie  Phone Number:on chart  Summary of Conversation: care explained         Problem: Adult Inpatient Plan of Care  Goal: Plan of Care Review  Description: The Plan of Care Review/Shift note should be completed every shift.  The Outcome Evaluation is a brief statement about your assessment that the patient is improving, declining, or no change.  This information will be displayed automatically on your shift  note.  Outcome: Progressing  Flowsheets (Taken 7/29/2024 9240)  Plan of Care Reviewed With:   patient   significant other  Goal: Patient-Specific Goal (Individualized)  Description: You can add care plan individualizations to a care plan. Examples of Individualization might be:  \"Parent requests to be called daily at 9am for status\", \"I have a hard time hearing out of my right ear\", or \"Do not touch me to wake me up as it startles  me\".  Outcome: Progressing  Goal: Absence of Hospital-Acquired Illness or Injury  Outcome: Progressing  Intervention: Identify and Manage Fall Risk  Recent Flowsheet Documentation  Taken 7/29/2024 0400 by Edith Arreola, RN  Safety Promotion/Fall Prevention:   activity supervised   bedside attendant   increase visualization of patient   patient and family education   room " organization consistent   safety round/check completed  Taken 7/29/2024 0000 by Edith Arreola RN  Safety Promotion/Fall Prevention:   activity supervised   bedside attendant   increase visualization of patient   patient and family education   room organization consistent   safety round/check completed  Taken 7/28/2024 2000 by Edith Arreola RN  Safety Promotion/Fall Prevention:   activity supervised   bedside attendant   increase visualization of patient   patient and family education   room organization consistent   safety round/check completed  Intervention: Prevent Skin Injury  Recent Flowsheet Documentation  Taken 7/29/2024 0400 by Edith Arreola RN  Body Position: position changed independently  Taken 7/29/2024 0200 by Edith Arreola RN  Body Position: position changed independently  Taken 7/29/2024 0000 by Edith Arreola RN  Body Position: position changed independently  Taken 7/28/2024 2200 by Edith Arreola RN  Body Position: position changed independently  Taken 7/28/2024 2000 by Edith Arreola RN  Body Position: position changed independently  Intervention: Prevent and Manage VTE (Venous Thromboembolism) Risk  Recent Flowsheet Documentation  Taken 7/29/2024 0400 by Edith Arreola RN  VTE Prevention/Management: patient refused intervention  Taken 7/29/2024 0000 by Edith Arreola RN  VTE Prevention/Management: patient refused intervention  Taken 7/28/2024 2000 by Edith Arreola RN  VTE Prevention/Management: patient refused intervention  Intervention: Prevent Infection  Recent Flowsheet Documentation  Taken 7/29/2024 0400 by Edith Arreola RN  Infection Prevention:   equipment surfaces disinfected   hand hygiene promoted  Taken 7/29/2024 0000 by Edith Arreola RN  Infection Prevention:   equipment surfaces disinfected   hand hygiene promoted  Taken 7/28/2024 2000 by Edith Arreola RN  Infection Prevention:   equipment surfaces disinfected   hand hygiene promoted  Goal: Optimal  Comfort and Wellbeing  Outcome: Progressing  Intervention: Monitor Pain and Promote Comfort  Recent Flowsheet Documentation  Taken 7/28/2024 2032 by Edith Arreola, RN  Pain Management Interventions: medication (see MAR)  Taken 7/28/2024 2000 by Edith Arreola, RN  Pain Management Interventions: medication (see MAR)  Goal: Readiness for Transition of Care  Outcome: Progressing   Goal Outcome Evaluation:      Plan of Care Reviewed With: patient, significant other

## 2024-07-29 NOTE — CONSULTS
Met with patient to discuss possible EMILY treatment options and to possibly complete an assessment. Assessment has been completed at this time and patient is being recommended:           Recommendations: Intensive Outpatient Treatment at [facility].       Referrals/ Alternatives:  [id number]  [address]  [place], [place], [address]  [phone]     [facility],  [phone]      Referrals are being made at this time.  Patient is aware of the referrals and is in agreement.     AMELIA Redd on 7/29/2024 at 9:58 AM

## 2024-07-29 NOTE — PROGRESS NOTES
[facility]    Medicine Progress Note - Hospitalist Service    Date of Admission:  7/23/2024    Assessment & Plan   Chandler Branch 45 yo M PMH alcohol use disorder, primary HTN, CT imaging evidence of CAD, GERD presented 7/23/24 for nausea, vomiting, & abdominal pain. Found to have alcohol withdrawal syndrome, acute alcoholic hepatitis, & pancreatitis with imaging evidence of hypo-enhancement concerning for necrosis without significant end-organ injury.     Severe acute pancreatitis -alcohol induced  SIRS response  -Presented with abdominal pain, elevated lipase  -Imaging with peripancreatic fat stranding, fluid and hypoenhancement of the pancreatic head  -Triglycerides not elevated, no signs of stone on CT  -Received early fluid resuscitation  -Not tolerate NGT placement for early enteral feeding  -Seen by GI, appreciate recommendations  -Empiric meropenem for 7 days due to recurrent fevers  -Repeat Ct abd/pelvis  -On low-fat diet    Alcohol withdrawal  EtOH abuse  -Last drink 07/23  -Patient required Precedex and phenobarbital, was in ICU  -CIWA-diazepam  -Thiamine, FA, MV  -Chemical dependency consult    Acute hypoxic respiratory failure  -Likely secondary to hypervolemia with resuscitation, atelectasis.  Possible underlying small airway disease  -7/26/24 CT PE demonstrated mild-moderate bilateral pleural effusions with adjacent atelectasis, mosaic attenuation, no PE   -Diuretics and IV fluids held  -Incentive spirometry  -Wean oxygen as able    Hypokalemia  Hypomagnesemia  -Monitor and replete electrolytes as needed    Thrombocytopenia -resolved    HTN  -Hold PTA lisinopril-hydrochlorothiazide  -As needed hydralazine and labetalol    CAD  -Imaging shows coronary artery calcification  -HbA1c, Lipid panel  -Cardiology referral outpatient    GERD  -PTA omeprazole    HLD  Resume PTA statin at discharge    Transfer patient out of ICU        Diet: Low Saturated Fat Diet  Snacks/Supplements Adult:  Ensure Clear; With Meals  Snacks/Supplements Adult: Gelatein Plus; With Meals    DVT Prophylaxis: Enoxaparin (Lovenox) SQ  Mancia Catheter: Not present  Lines: None     Cardiac Monitoring: ACTIVE order. Indication: ICU  Code Status: Full Code      Clinically Significant Risk Factors        # Hypokalemia: Lowest K = 2.9 mmol/L in last 2 days, will replace as needed     # Hypomagnesemia: Lowest Mg = 1.6 mg/dL in last 2 days, will replace as needed   # Hypoalbuminemia: Lowest albumin = 2.8 g/dL at 7/27/2024  4:19 AM, will monitor as appropriate     # Hypertension: Noted on problem list            # Overweight: Estimated body mass index is 27.82 kg/m  as calculated from the following:    Height as of this encounter: 1.829 m (6').    Weight as of this encounter: 93 kg (205 lb 1.6 oz).      # Financial/Environmental Concerns: none         Disposition Plan     Medically Ready for Discharge: Anticipated in 2-4 Days             Petrona Reeder MD  Hospitalist Service  [facility]  Securely message with [email or web address] (more info)  Text page via [email or web address] Paging/Directory   ______________________________________________________________________    Interval History   Patient was examined at the bedside, awake and alert today.  Continues to be tachycardic likely secondary to alcohol withdrawal    Physical Exam   Vital Signs: Temp: 99.5  F (37.5  C) Temp src: Oral BP: (!) 157/87 Pulse: 114   Resp: 24 SpO2: 93 % O2 Device: Nasal cannula Oxygen Delivery: 2 LPM  Weight: 205 lbs 1.6 oz    General:  Appears stated age, no acute distress. A&O x 3.  Chest:  Breath sounds CTA and no increased work of breathing on room air.No rales or wheezes  Cardiovascular: Tachycardic, regular, no rub or murmur. No peripheral edema.  Abdomen:  Soft, non-tender, non-distended.  Musculoskeletal:  Moves all four extremities. No muscle atrophy.  Neurological: No gross focal deficits    Medical Decision Making       50 MINUTES SPENT BY ME on the  date of service doing chart review, history, exam, documentation & further activities per the note.      Data     I have personally reviewed the following data over the past 24 hrs:    8.9  \   13.4   / 227     137 99 8.7 /  130 (H)   3.1 (L) 26 0.57 (L) \     Procal: 0.50 (H) CRP: N/A Lactic Acid: N/A         Imaging results reviewed over the past 24 hrs:   No results found for this or any previous visit (from the past 24 hour(s)).

## 2024-07-29 NOTE — PROGRESS NOTES
"CLINICAL NUTRITION SERVICES - REASSESSMENT NOTE     Nutrition Prescription    RECOMMENDATIONS FOR MDs/PROVIDERS TO ORDER:      Malnutrition Status:    Not noted at this time.    Recommendations already ordered by Registered Dietitian (RD):  Supplements-Ensure clear daily and Gelatein 2x/day (Low fat supplements)    Future/Additional Recommendations:       EVALUATION OF THE PROGRESS TOWARD GOALS   Diet: Low Fat diet 7/26/24.  Patient ordering small meals.    Staff charting poor po intake.       NEW FINDINGS   Met patient and patient's significant other.  Patient has been eating fruit, gelatin, toast, yogurt.  Patient c/o abdominal pain and nausea which limits po intake. Patient reports decreased po intake for a while.      ANTHROPOMETRICS  Height: 182.9 cm (6' 0\")  Admission wt 84.4 kg (186 lb) 7/23/24, 88.2 kg (195 lb 12.3 oz) 7/24/24.    Most Recent Weight: 93 kg (205 lb 1.6 oz) 7/26/24.  +3.39 L since admission per I/Os.     Dosing Weight: 84.4 kg     ASSESSED NUTRITION NEEDS  Estimated Energy Needs: 8342-1177 kcals/day (25 - 28 kcals/kg)  Justification: Maintenance  Estimated Protein Needs:  grams protein/day (1 - 1.5 grams of pro/kg)  Justification: Increased needs  Estimated Fluid Needs: 0614-8529 mL/day (1 mL/kcal)   Justification: Maintenance    PHYSICAL FINDINGS  See malnutrition section below.  Last BM 7/24/24-constipation   C/o nausea.    LABS  Potassium 2.9 (L)-replacing, Magnesium 1.6 (L)-replacing  Lipase 1425 (H) 7/24/24.  Reviewed    MEDICATIONS  Reviewed    MALNUTRITION:  % Weight Loss:  None noted  % Intake:  </= 50% for >/= 5 days (severe malnutrition)   Subcutaneous Fat Loss:  None observed  Muscle Loss:  None observed  Fluid Retention:  None noted    Malnutrition Diagnosis: Patient does not meet two of the above criteria necessary for diagnosing malnutrition    Previous Goals   Tolerate TF  Meet nutrition needs with T  Evaluation: TF not started, no longer applicable.    Previous Nutrition " Diagnosis  Inadequate oral intake related to altered GI function (pancreatitis) as evidenced by NPO and need for enteral nutrition   Evaluation: adjusted below    CURRENT NUTRITION DIAGNOSIS  Inadequate oral intake related to altered GI function (pancreatitis) as evidenced by nausea, decreased intake.      INTERVENTIONS  Implementation  Nutrition education for recommended modifications-low fat diet.  Provided education on low fat diet.  Provided written materials:  Low fat diet in pancreatitis, fat content of menu items.       Start low fat supplements with meals:  Ensure clear daily and Gelatein 2x/day.    Goals  Patient to consume % of nutritionally adequate meals three times per day, or the equivalent with supplements/snacks.  Maintain wt.  Electrolytes WNL    Monitoring/Evaluation  Progress toward goals will be monitored and evaluated per protocol.

## 2024-07-29 NOTE — PROGRESS NOTES
Care Management Follow Up    Length of Stay (days): 5    Expected Discharge Date: 07/31/2024    Anticipated Discharge Plan:       Transportation: TBD    PT Recommendations:    OT Recommendations:        Barriers to Discharge: medical stability    Prior Living Situation: house with child(dorina), dependent     Patient/Spokesperson Updated: No    Additional Information:  Discussed patient in ICU rounds. Patient improving in status.     Chart reviewed, IP Chem Dep met with patient, recommendation for Intensive outpatient treatment. They are arranging/ referrals pending.     Social HX: Patient is independent with all activities of daily living and instrumental activities of daily living (IADLs) at baseline.     CM will continue to follow care progression and aide in discharge planning as needed.     Wen Theodore RN

## 2024-07-29 NOTE — DISCHARGE INSTRUCTIONS
Met with patient to discuss possible EMILY treatment options and to possibly complete an assessment. Assessment has been completed at this time and patient is being recommended:         Recommendations: Intensive Outpatient Treatment at [facility].     Patient should call [facility], [phone] to begin IOP treatment      Referrals/ Alternatives:  [facility] [facility]  [address]  [place], [place], [address]  [phone]     [facility],  [phone]

## 2024-07-30 ENCOUNTER — APPOINTMENT (OUTPATIENT)
Dept: CT IMAGING | Facility: HOSPITAL | Age: 44
End: 2024-07-30
Attending: PHYSICIAN ASSISTANT
Payer: COMMERCIAL

## 2024-07-30 ENCOUNTER — APPOINTMENT (OUTPATIENT)
Dept: PHYSICAL THERAPY | Facility: HOSPITAL | Age: 44
End: 2024-07-30
Attending: STUDENT IN AN ORGANIZED HEALTH CARE EDUCATION/TRAINING PROGRAM
Payer: COMMERCIAL

## 2024-07-30 LAB
ANION GAP SERPL CALCULATED.3IONS-SCNC: 11 MMOL/L (ref 7–15)
BACTERIA BLD CULT: NO GROWTH
BUN SERPL-MCNC: 7.4 MG/DL (ref 6–20)
CA-I BLD-MCNC: 4.4 MG/DL (ref 4.4–5.2)
CALCIUM SERPL-MCNC: 8.5 MG/DL (ref 8.8–10.4)
CHLORIDE SERPL-SCNC: 98 MMOL/L (ref 98–107)
CHOLEST SERPL-MCNC: 166 MG/DL
CREAT SERPL-MCNC: 0.61 MG/DL (ref 0.67–1.17)
EGFRCR SERPLBLD CKD-EPI 2021: >90 ML/MIN/1.73M2
ERYTHROCYTE [DISTWIDTH] IN BLOOD BY AUTOMATED COUNT: 12 % (ref 10–15)
GLUCOSE BLDC GLUCOMTR-MCNC: 116 MG/DL (ref 70–99)
GLUCOSE BLDC GLUCOMTR-MCNC: 133 MG/DL (ref 70–99)
GLUCOSE BLDC GLUCOMTR-MCNC: 137 MG/DL (ref 70–99)
GLUCOSE SERPL-MCNC: 136 MG/DL (ref 70–99)
HCO3 SERPL-SCNC: 24 MMOL/L (ref 22–29)
HCT VFR BLD AUTO: 34.4 % (ref 40–53)
HDLC SERPL-MCNC: 17 MG/DL
HGB BLD-MCNC: 12 G/DL (ref 13.3–17.7)
LDLC SERPL CALC-MCNC: 110 MG/DL
MAGNESIUM SERPL-MCNC: 1.8 MG/DL (ref 1.7–2.3)
MCH RBC QN AUTO: 32.3 PG (ref 26.5–33)
MCHC RBC AUTO-ENTMCNC: 34.9 G/DL (ref 31.5–36.5)
MCV RBC AUTO: 93 FL (ref 78–100)
NONHDLC SERPL-MCNC: 149 MG/DL
PHOSPHATE SERPL-MCNC: 2.5 MG/DL (ref 2.5–4.5)
PLATELET # BLD AUTO: 304 10E3/UL (ref 150–450)
POTASSIUM SERPL-SCNC: 3.4 MMOL/L (ref 3.4–5.3)
POTASSIUM SERPL-SCNC: 4.1 MMOL/L (ref 3.4–5.3)
PROCALCITONIN SERPL IA-MCNC: 0.34 NG/ML
RBC # BLD AUTO: 3.71 10E6/UL (ref 4.4–5.9)
SODIUM SERPL-SCNC: 133 MMOL/L (ref 135–145)
TRIGL SERPL-MCNC: 194 MG/DL
WBC # BLD AUTO: 9.7 10E3/UL (ref 4–11)

## 2024-07-30 PROCEDURE — 250N000011 HC RX IP 250 OP 636: Performed by: STUDENT IN AN ORGANIZED HEALTH CARE EDUCATION/TRAINING PROGRAM

## 2024-07-30 PROCEDURE — 250N000011 HC RX IP 250 OP 636: Performed by: INTERNAL MEDICINE

## 2024-07-30 PROCEDURE — 120N000001 HC R&B MED SURG/OB

## 2024-07-30 PROCEDURE — 84100 ASSAY OF PHOSPHORUS: CPT | Performed by: STUDENT IN AN ORGANIZED HEALTH CARE EDUCATION/TRAINING PROGRAM

## 2024-07-30 PROCEDURE — 250N000013 HC RX MED GY IP 250 OP 250 PS 637: Performed by: SURGERY

## 2024-07-30 PROCEDURE — 82330 ASSAY OF CALCIUM: CPT | Performed by: STUDENT IN AN ORGANIZED HEALTH CARE EDUCATION/TRAINING PROGRAM

## 2024-07-30 PROCEDURE — 250N000011 HC RX IP 250 OP 636: Performed by: PHYSICIAN ASSISTANT

## 2024-07-30 PROCEDURE — 97116 GAIT TRAINING THERAPY: CPT | Mod: GP

## 2024-07-30 PROCEDURE — 99233 SBSQ HOSP IP/OBS HIGH 50: CPT | Performed by: STUDENT IN AN ORGANIZED HEALTH CARE EDUCATION/TRAINING PROGRAM

## 2024-07-30 PROCEDURE — 74177 CT ABD & PELVIS W/CONTRAST: CPT

## 2024-07-30 PROCEDURE — 80061 LIPID PANEL: CPT | Performed by: STUDENT IN AN ORGANIZED HEALTH CARE EDUCATION/TRAINING PROGRAM

## 2024-07-30 PROCEDURE — 85027 COMPLETE CBC AUTOMATED: CPT | Performed by: STUDENT IN AN ORGANIZED HEALTH CARE EDUCATION/TRAINING PROGRAM

## 2024-07-30 PROCEDURE — 84132 ASSAY OF SERUM POTASSIUM: CPT | Performed by: STUDENT IN AN ORGANIZED HEALTH CARE EDUCATION/TRAINING PROGRAM

## 2024-07-30 PROCEDURE — 80048 BASIC METABOLIC PNL TOTAL CA: CPT | Performed by: STUDENT IN AN ORGANIZED HEALTH CARE EDUCATION/TRAINING PROGRAM

## 2024-07-30 PROCEDURE — 250N000013 HC RX MED GY IP 250 OP 250 PS 637: Performed by: INTERNAL MEDICINE

## 2024-07-30 PROCEDURE — 97162 PT EVAL MOD COMPLEX 30 MIN: CPT | Mod: GP

## 2024-07-30 PROCEDURE — 250N000013 HC RX MED GY IP 250 OP 250 PS 637: Performed by: STUDENT IN AN ORGANIZED HEALTH CARE EDUCATION/TRAINING PROGRAM

## 2024-07-30 PROCEDURE — 250N000011 HC RX IP 250 OP 636: Performed by: SURGERY

## 2024-07-30 PROCEDURE — 36415 COLL VENOUS BLD VENIPUNCTURE: CPT | Performed by: STUDENT IN AN ORGANIZED HEALTH CARE EDUCATION/TRAINING PROGRAM

## 2024-07-30 PROCEDURE — 83735 ASSAY OF MAGNESIUM: CPT | Performed by: STUDENT IN AN ORGANIZED HEALTH CARE EDUCATION/TRAINING PROGRAM

## 2024-07-30 PROCEDURE — 84145 PROCALCITONIN (PCT): CPT | Performed by: STUDENT IN AN ORGANIZED HEALTH CARE EDUCATION/TRAINING PROGRAM

## 2024-07-30 RX ORDER — POTASSIUM CHLORIDE 1500 MG/1
20 TABLET, EXTENDED RELEASE ORAL ONCE
Status: COMPLETED | OUTPATIENT
Start: 2024-07-30 | End: 2024-07-30

## 2024-07-30 RX ORDER — MAGNESIUM SULFATE HEPTAHYDRATE 40 MG/ML
2 INJECTION, SOLUTION INTRAVENOUS ONCE
Status: COMPLETED | OUTPATIENT
Start: 2024-07-30 | End: 2024-07-30

## 2024-07-30 RX ORDER — POTASSIUM CHLORIDE 1500 MG/1
40 TABLET, EXTENDED RELEASE ORAL ONCE
Status: COMPLETED | OUTPATIENT
Start: 2024-07-30 | End: 2024-07-30

## 2024-07-30 RX ORDER — FUROSEMIDE 10 MG/ML
20 INJECTION INTRAMUSCULAR; INTRAVENOUS EVERY 12 HOURS
Status: COMPLETED | OUTPATIENT
Start: 2024-07-30 | End: 2024-07-31

## 2024-07-30 RX ORDER — ATORVASTATIN CALCIUM 40 MG/1
40 TABLET, FILM COATED ORAL EVERY EVENING
Status: DISCONTINUED | OUTPATIENT
Start: 2024-07-30 | End: 2024-08-01 | Stop reason: HOSPADM

## 2024-07-30 RX ORDER — IOPAMIDOL 755 MG/ML
90 INJECTION, SOLUTION INTRAVASCULAR ONCE
Status: COMPLETED | OUTPATIENT
Start: 2024-07-30 | End: 2024-07-30

## 2024-07-30 RX ADMIN — HYDRALAZINE HYDROCHLORIDE 10 MG: 20 INJECTION INTRAMUSCULAR; INTRAVENOUS at 04:50

## 2024-07-30 RX ADMIN — POTASSIUM & SODIUM PHOSPHATES POWDER PACK 280-160-250 MG 1 PACKET: 280-160-250 PACK at 06:14

## 2024-07-30 RX ADMIN — POTASSIUM CHLORIDE 20 MEQ: 1500 TABLET, EXTENDED RELEASE ORAL at 02:38

## 2024-07-30 RX ADMIN — FOLIC ACID 1 MG: 1 TABLET ORAL at 10:10

## 2024-07-30 RX ADMIN — MEROPENEM 1 G: 1 INJECTION, POWDER, FOR SOLUTION INTRAVENOUS at 02:18

## 2024-07-30 RX ADMIN — MEROPENEM 1 G: 1 INJECTION, POWDER, FOR SOLUTION INTRAVENOUS at 19:20

## 2024-07-30 RX ADMIN — Medication 1 TABLET: at 10:11

## 2024-07-30 RX ADMIN — HYDROMORPHONE HYDROCHLORIDE 0.3 MG: 1 INJECTION, SOLUTION INTRAMUSCULAR; INTRAVENOUS; SUBCUTANEOUS at 00:46

## 2024-07-30 RX ADMIN — THIAMINE HCL TAB 100 MG 100 MG: 100 TAB at 14:10

## 2024-07-30 RX ADMIN — THIAMINE HCL TAB 100 MG 100 MG: 100 TAB at 20:22

## 2024-07-30 RX ADMIN — MAGNESIUM SULFATE HEPTAHYDRATE 2 G: 40 INJECTION, SOLUTION INTRAVENOUS at 06:14

## 2024-07-30 RX ADMIN — MEROPENEM 1 G: 1 INJECTION, POWDER, FOR SOLUTION INTRAVENOUS at 10:11

## 2024-07-30 RX ADMIN — IOPAMIDOL 90 ML: 755 INJECTION, SOLUTION INTRAVENOUS at 03:18

## 2024-07-30 RX ADMIN — THIAMINE HCL TAB 100 MG 100 MG: 100 TAB at 10:10

## 2024-07-30 RX ADMIN — FUROSEMIDE 20 MG: 10 INJECTION, SOLUTION INTRAMUSCULAR; INTRAVENOUS at 14:10

## 2024-07-30 RX ADMIN — POTASSIUM CHLORIDE 40 MEQ: 1500 TABLET, EXTENDED RELEASE ORAL at 06:14

## 2024-07-30 RX ADMIN — HYDROMORPHONE HYDROCHLORIDE 0.3 MG: 1 INJECTION, SOLUTION INTRAMUSCULAR; INTRAVENOUS; SUBCUTANEOUS at 20:22

## 2024-07-30 RX ADMIN — ONDANSETRON 4 MG: 2 INJECTION INTRAMUSCULAR; INTRAVENOUS at 14:10

## 2024-07-30 RX ADMIN — HYDROMORPHONE HYDROCHLORIDE 0.3 MG: 1 INJECTION, SOLUTION INTRAMUSCULAR; INTRAVENOUS; SUBCUTANEOUS at 14:09

## 2024-07-30 RX ADMIN — PANTOPRAZOLE SODIUM 40 MG: 40 TABLET, DELAYED RELEASE ORAL at 06:46

## 2024-07-30 RX ADMIN — HYDRALAZINE HYDROCHLORIDE 10 MG: 20 INJECTION INTRAMUSCULAR; INTRAVENOUS at 14:23

## 2024-07-30 RX ADMIN — ENOXAPARIN SODIUM 40 MG: 40 INJECTION SUBCUTANEOUS at 14:10

## 2024-07-30 RX ADMIN — LABETALOL HYDROCHLORIDE 10 MG: 5 INJECTION, SOLUTION INTRAVENOUS at 23:40

## 2024-07-30 RX ADMIN — POTASSIUM & SODIUM PHOSPHATES POWDER PACK 280-160-250 MG 1 PACKET: 280-160-250 PACK at 10:09

## 2024-07-30 RX ADMIN — ATORVASTATIN CALCIUM 40 MG: 40 TABLET, FILM COATED ORAL at 20:22

## 2024-07-30 RX ADMIN — ONDANSETRON 4 MG: 4 TABLET, ORALLY DISINTEGRATING ORAL at 20:22

## 2024-07-30 ASSESSMENT — ACTIVITIES OF DAILY LIVING (ADL)
ADLS_ACUITY_SCORE: 37
ADLS_ACUITY_SCORE: 37
ADLS_ACUITY_SCORE: 36
ADLS_ACUITY_SCORE: 37
ADLS_ACUITY_SCORE: 32
ADLS_ACUITY_SCORE: 32
ADLS_ACUITY_SCORE: 36
ADLS_ACUITY_SCORE: 37
ADLS_ACUITY_SCORE: 36
ADLS_ACUITY_SCORE: 30
ADLS_ACUITY_SCORE: 37
ADLS_ACUITY_SCORE: 36
ADLS_ACUITY_SCORE: 37
ADLS_ACUITY_SCORE: 36
ADLS_ACUITY_SCORE: 32
ADLS_ACUITY_SCORE: 36

## 2024-07-30 NOTE — PROGRESS NOTES
Physical Therapy        07/30/24 1400   Appointment Info   Signing Clinician's Name / Credentials (PT) Cyndee Kc DPT   Living Environment   People in Home alone   Current Living Arrangements house   Home Accessibility stairs to enter home;stairs within home   Number of Stairs, Main Entrance 6   Stair Railings, Main Entrance railings safe and in good condition   Number of Stairs, Within Home, Primary greater than 10 stairs   Stair Railings, Within Home, Primary railings safe and in good condition   Living Environment Comments girlfriend lives next door   Self-Care   Equipment Currently Used at Home none   Activity/Exercise/Self-Care Comment independent with all mobility, ADLs, IADLs, works full time   General Information   Onset of Illness/Injury or Date of Surgery 07/23/24   Referring Physician Johnny Gaxiola MD   Patient/Family Therapy Goals Statement (PT) go home, get back to work   Pertinent History of Current Problem (include personal factors and/or comorbidities that impact the POC) Chandler Branch 43 yo M PMH alcohol use disorder, primary HTN, CT imaging evidence of CAD, GERD presented 7/23/24 for nausea, vomiting, & abdominal pain. Found to have alcohol withdrawal syndrome, acute alcoholic hepatitis, & pancreatitis with imaging evidence of hypo-enhancement concerning for necrosis without significant end-organ injury.   Existing Precautions/Restrictions fall   Cognition   Follows Commands (Cognition) WFL   Safety Deficit (Cognition) impulsivity;moderate deficit;awareness of need for assistance;insight into deficits/self-awareness   Pain Assessment   Patient Currently in Pain No   Integumentary/Edema   Integumentary/Edema no deficits were identifed   Posture    Posture Not impaired   Range of Motion (ROM)   Range of Motion ROM is WFL   Strength (Manual Muscle Testing)   Strength (Manual Muscle Testing) strength is WFL   Bed Mobility   Comment, (Bed Mobility) independent supine to sit   Transfers   Comment,  (Transfers) close supervision   Gait/Stairs (Locomotion)   Comment, (Gait/Stairs) 20' with CGA, no AD, significant path and step length variation   Balance   Balance Comments dynamic balance impaired   Sensory Examination   Sensory Perception patient reports no sensory changes   Coordination   Coordination no deficits were identified   Muscle Tone   Muscle Tone no deficits were identified   Clinical Impression   Criteria for Skilled Therapeutic Intervention Yes, treatment indicated   PT Diagnosis (PT) gait instability   Influenced by the following impairments impaired balance   Functional limitations due to impairments fall risk limiting household mobility   Clinical Presentation (PT Evaluation Complexity) stable   Clinical Presentation Rationale presents as medically diagnosed   Clinical Decision Making (Complexity) moderate complexity   Planned Therapy Interventions (PT) balance training;bed mobility training;gait training;neuromuscular re-education;patient/family education;ROM (range of motion);stair training;strengthening;transfer training;progressive activity/exercise;risk factor education   Risk & Benefits of therapy have been explained evaluation/treatment results reviewed;participants voiced agreement with care plan;participants included;patient   PT Total Evaluation Time   PT Eval, Moderate Complexity Minutes (82715) 10   Physical Therapy Goals   PT Frequency Daily   PT Predicted Duration/Target Date for Goal Attainment 08/06/24   PT Goals Transfers;Gait;Stairs;PT Goal 1   PT: Transfers Independent;Sit to/from stand   PT: Gait Independent;Greater than 200 feet   PT: Stairs 10 stairs;Supervision/stand-by assist;Rail on both sides   PT: Goal 1 complete a balance assessment to quantify fall risk   Interventions   Interventions Quick Adds Gait Training   Gait Training   Gait Training Minutes (36399) 12   Symptoms Noted During/After Treatment (Gait Training) none   Treatment Detail/Skilled Intervention  significant deviations in path, speed, step length, step width. Several LOB requiring Amadou to recover. cues for direction and safety   Distance in Feet 300'   [place] Level (Gait Training) minimum assist (75% patient effort)   Physical Assistance Level (Gait Training) 1 person assist;supervision;verbal cues   PT Discharge Planning   PT Plan walk to clinic, [place], balance ex, stairs   PT Discharge Recommendation (DC Rec) [facility] patient will benefit from intensive, interdisciplinary therapy.  Anticipate will be able to tolerate 3 hours of therapy per day   PT Rationale for DC Rec pt typically active and independent. Currently at high risk of falls with PT and OT needs.   PT Brief overview of current status amb 350' with Amadou. impaired balance and poor safety awareness   Total Session Time   Timed Code Treatment Minutes 12   Total Session Time (sum of timed and untimed services) 22       Cyndee Kc, JUAN 7/30/2024

## 2024-07-30 NOTE — PLAN OF CARE
Goal Outcome Evaluation:      Plan of Care Reviewed With: patient    Overall Patient Progress: improvingOverall Patient Progress: improving    Outcome Evaluation: continued progress    [facility] - ICU    RN Progress Note:            Pertinent Assessments:      Please refer to flowsheet rows for full assessment     Patient a/ox4 this shift, but will at times have confused conversation. Tachycardia noted. Hydralazine administered x1. 2L o2 per NC placed for sats 88% while sleeping. Patient down to CT this shift without incidence. Patient incontinent of bowel and bladder, but will use urinal appropriate at times. Dilaudid administered for pain with relief. Zofran administered x1 for nausea. Patient very unsteady when out of bed, bed alarm on. Will continue to monitor.           Key Events - This Shift:                 Barriers to Discharge / Downgrade:     Currently med/tele

## 2024-07-30 NOTE — PROGRESS NOTES
[facility] Progress Note     IMPRESSION:  45 yo M PMH alcohol use disorder, primary HTN, CT imaging evidence of CAD, GERD     Presented 7/23/24 for nausea, vomiting, & abdominal pain. Found to have alcohol withdrawal syndrome, acute alcoholic hepatitis, & pancreatitis with imaging evidence of hypo-enhancement concerning for necrosis without sustained systemic inflammation or end-organ injury.    # Alcohol withdrawal  - On CIWA protocol, Thiamine, and folate    # Severe necrotizing pancreatitis  - CT with acute pancreatitis and prominent hypoenhancement at head and uncinate process, possibly could represent necrotizing pancreatitis, wall thickened duodenum and hepatic flexure of colon, borderline left retroperitoneal lymph nodes  - empirically started on meropenem, on LR 50cc/hr currently  - Repeat CT on 7/30 - mild decreased enhancement of pancreatic tail, possibly worsened compared to previous study and could represent area of pancreatic necrosis. Pancreatic head and uncinate process without significant changes. Ongoing, significant peripancreatic fat stranding       RECOMMENDATIONS:  - Diet: low-fat diet    - Repeat cultures at 1 week, if negative, likely can stop meropenem     - Continue LR 50cc/hr (may consider increasing this to 75cc-100cc/hr if his PO intake remains low and I/O is with net losses)     - Will continue to follow     15 minutes of total time was spent providing patient care, including patient evaluation, reviewing documentation/test results, and     Hugo Schmidt PA-C  [facility]  [phone]  ________________________________________________________________________      SUBJECTIVE:    Pains are mildly improved today (around 4-5/10). He is tolerating his diet to some extent but appetite is not quite back to normal. No nausea/vomiting.        OBJECTIVE:  BP (!) 140/84   Pulse 112   Temp 99.8  F (37.7  C) (Oral)   Resp 20   Ht 1.829 m (6')   Wt 85 kg (187  lb 8 oz)   SpO2 93%   BMI 25.43 kg/m    Temp (24hrs), Av.4  F (37.4  C), Min:98.2  F (36.8  C), Max:101.5  F (38.6  C)    Patient Vitals for the past 72 hrs:   Weight   24 0400 85 kg (187 lb 8 oz)       Intake/Output Summary (Last 24 hours) at 2024 0904  Last data filed at 2024 0600  Gross per 24 hour   Intake 2650.05 ml   Output 5580 ml   Net -2929.95 ml        PHYSICAL EXAM  GEN: Alert, oriented x3, communicative and in NAD.    LYMPH: No LAD noted.  HRT: RRR  LUNGS: CTA  ABD:  ND, +BS, + pain to palpation, no rebound, no HSM.  SKIN: No rash, jaundice or spider angiomata      LABS:  I have reviewed the patient's new clinical lab results:     Recent Labs   Lab Test 24  0450 24  0854 24  0711   WBC 9.7 8.9 8.3   HGB 12.0* 13.4 12.1*   MCV 93 95 95    227 165     Recent Labs   Lab Test 24  0450 24  2151 24  1437 24  0854 24  0414 24  0711   POTASSIUM 3.4 3.6 3.1* 2.9*   < > 3.6  3.6   CHLORIDE 98  --   --  99  --  105   CO2 24  --   --  26  --  24   BUN 7.4  --   --  8.7  --  11.9   CR 0.61*  --   --  0.57*  --  0.55*   ANIONGAP 11  --   --  12  --  12    < > = values in this interval not displayed.     Recent Labs   Lab Test 24  0711 24  0419 24  1025 24  0453 24  0801 24  0003 04/10/24  1556 22  0637 22  1335   ALBUMIN 2.9* 2.8*  --  3.4* 4.2 4.5 4.7   < >  --    BILITOTAL 0.8 1.1  --  1.1 0.6 0.5 0.4   < >  --    ALT 35 44  --  90* 163* 203* 176*   < >  --    AST 35 46*  --  84* 200* 274* 272*   < >  --    PROTEIN  --   --  50*  --   --   --   --   --  Negative   LIPASE  --   --   --   --  1,425* 1,207* 117*   < >  --     < > = values in this interval not displayed.         IMAGING:  Reviewed

## 2024-07-30 NOTE — PROGRESS NOTES
[facility]    Medicine Progress Note - Hospitalist Service    Date of Admission:  7/23/2024    Assessment & Plan   Chandler Branch 45 yo M PMH alcohol use disorder, primary HTN, CT imaging evidence of CAD, GERD presented 7/23/24 for nausea, vomiting, & abdominal pain. Found to have alcohol withdrawal syndrome, acute alcoholic hepatitis, & pancreatitis with imaging evidence of hypo-enhancement concerning for necrosis without significant end-organ injury.     Severe acute pancreatitis -alcohol induced  SIRS response  -Presented with abdominal pain, elevated lipase  -Imaging with peripancreatic fat stranding, fluid and hypoenhancement of the pancreatic head  -Triglycerides not elevated, no signs of stone on CT  -Received early fluid resuscitation  -Not tolerate NGT placement for early enteral feeding  -Repeat CT 07/30 - mild decrease enhancement of pancreatic tail, possibly worsening compared to previous study. Pancreatic head and uncinate process without significant changes.  Significant peripancreatic fat stranding  -Seen by GI, appreciate recommendations  -Empiric meropenem for 7 days due to recurrent fevers  -On low-fat diet    Alcohol withdrawal  EtOH abuse  -Last drink 07/23  -Patient required Precedex and phenobarbital, was in ICU  -CIWA-diazepam  -Thiamine, FA, MV  -Chemical dependency consult    Acute hypoxic respiratory failure - resolved  -Likely secondary to hypervolemia with resuscitation, atelectasis.  Possible underlying small airway disease  -7/26/24 CT PE demonstrated mild-moderate bilateral pleural effusions with adjacent atelectasis, mosaic attenuation, no PE   -07/30 CT shows small bilateral pleural effusions, associated basilar atelectasis/consolidation.  Small to moderate volume ascites, unchanged  -No evidence of pneumonia, procalcitonin normal  -Resp panel for new onset cough  -will give 2 doses Lasix 20mg IV, off IV fluids  -Incentive spirometry  -Wean oxygen as  able    Hypokalemia  Hypomagnesemia  -Monitor and replete electrolytes as needed    Thrombocytopenia -resolved    Mild hyponatremia  Will give 2 doses lasix for pleural effusion  Monitor Na    HTN  -Hold PTA lisinopril-hydrochlorothiazide  -As needed hydralazine and labetalol    CAD  -Imaging shows coronary artery calcification  -HbA1c - 5.7,   -Atorvastatin 40mg  -Cardiology referral outpatient    GERD  -PTA omeprazole    HLD  , will need high intensity statin  Atorvastatin 40mg    Transfer patient out of ICU          Diet: Low Saturated Fat Diet  Snacks/Supplements Adult: Ensure Clear; With Meals  Snacks/Supplements Adult: Gelatein Plus; With Meals    DVT Prophylaxis: Enoxaparin (Lovenox) SQ  Mancia Catheter: Not present  Lines: None     Cardiac Monitoring: ACTIVE order. Indication: ICU  Code Status: Full Code      Clinically Significant Risk Factors        # Hypokalemia: Lowest K = 2.9 mmol/L in last 2 days, will replace as needed     # Hypomagnesemia: Lowest Mg = 1.6 mg/dL in last 2 days, will replace as needed   # Hypoalbuminemia: Lowest albumin = 2.8 g/dL at 7/27/2024  4:19 AM, will monitor as appropriate     # Hypertension: Noted on problem list         #Precipitous drop in Hgb/Hct: Lowest Hgb this hospitalization: 12 g/dL. Will continue to monitor and treat/transfuse as appropriate.     # Overweight: Estimated body mass index is 25.43 kg/m  as calculated from the following:    Height as of this encounter: 1.829 m (6').    Weight as of this encounter: 85 kg (187 lb 8 oz).      # Financial/Environmental Concerns: none         Disposition Plan     Medically Ready for Discharge: Anticipated in 2-4 Days             Petrona Reeder MD  Hospitalist Service  [facility]  Securely message with [employer] (more info)  Text page via [employer] Paging/Directory   ______________________________________________________________________    Interval History   Patient was examined at the bedside,  states his pain is a liltte better today. Continues to be tachycardic and mildly hypertensive.   2 doses of Lasix for mild hypervolemia.  Will continue to monitor for withdrawal and GI following      Physical Exam   Vital Signs: Temp: 99.9  F (37.7  C) Temp src: Oral BP: (!) 172/105 (told rn) Pulse: 112   Resp: 20 SpO2: 93 % O2 Device: None (Room air) Oxygen Delivery: 2 LPM  Weight: 187 lbs 8 oz    General:  Appears stated age, no acute distress. A&O x 3.  Chest:  Breath sounds CTA and no increased work of breathing on room air.No rales or wheezes  Cardiovascular: Tachycardic, regular, no rub or murmur. No peripheral edema.  Abdomen:  Soft, non-tender, non-distended.  Musculoskeletal:  Moves all four extremities. No muscle atrophy.  Neurological: No gross focal deficits    Medical Decision Making       55 MINUTES SPENT BY ME on the date of service doing chart review, history, exam, documentation & further activities per the note.      Data     I have personally reviewed the following data over the past 24 hrs:    9.7  \   12.0 (L)   / 304     133 (L) 98 7.4 /  137 (H)   4.1 24 0.61 (L) \     TSH: N/A T4: N/A A1C: N/A     Procal: 0.34 CRP: N/A Lactic Acid: N/A         Imaging results reviewed over the past 24 hrs:   Recent Results (from the past 24 hour(s))   CT Abdomen Pelvis w Contrast    Narrative    EXAM: CT ABDOMEN PELVIS W CONTRAST  LOCATION: [facility]  DATE: 07/30/2024    INDICATION: Pancreatitis with necrosis.  COMPARISON: CT abdomen and pelvis on 07/24/2024.  TECHNIQUE: CT scan of the abdomen and pelvis was performed after the injection of Isovue 370, 90 mL intravenously. Multiplanar reformats were obtained. Dose reduction techniques were used.    FINDINGS:   LOWER CHEST: Small bilateral pleural effusions and associated basilar atelectasis/consolidation, new as compared to 07/24/2024.    ABDOMEN/PELVIS:  HEPATOBILIARY: Mild diffuse hypodense appearance of the liver, likely due to  underlying hepatic steatosis. No suspicious focal hepatic lesion. No radiodense gallstones.    PANCREAS: Mild decreased enhancement of the pancreatic head and uncinate process and to a lesser extent pancreatic tail, not significantly changed in the head and uncinate process as compared to 07/24/2024 and slightly worsened in the pancreatic tail as   compared to the same study. Significant peripancreatic fat stranding and fluid is again noted.    SPLEEN: No splenomegaly.    ADRENAL GLANDS: No adrenal nodules.    KIDNEYS/BLADDER: Contrast within the renal collecting system, precludes detailed evaluation for kidney stones. No hydronephrosis in either kidney. Small focus of gas within the urinary bladder, could be related to recent catheterization.    BOWEL: No abnormally dilated bowel loops.    PERITONEUM: Small to moderate volume ascites, not significantly changed as compared to 07/24/2024 exam.    PELVIC ORGANS: Unremarkable.    VASCULATURE: Moderate atherosclerotic vascular calcification of the abdominal aorta and iliac arteries.    LYMPH NODES: Multiple prominent upper abdominal and retroperitoneal lymph nodes, indeterminate, could be reactive.    MUSCULOSKELETAL: Multilevel degenerative changes of the spine. No suspicious osseous lesion.      Impression    IMPRESSION:   1.  No significant change in the area of decreased enhancement in the pancreatic head and uncinate process as compared to 07/24/2024 exam, suggesting area of pancreatic necrosis. Mild decreased enhancement of the pancreatic tail, new/worsened as compared   to same study, could also represent area of pancreatic necrosis. Significant peripancreatic fat stranding/fluid, related to patient's history of acute pancreatitis.  2.  Small to moderate volume ascites, not significantly changed as compared to 07/24/2024.  3.  Small bilateral pleural effusions and associated basilar atelectasis/consolidation, new as compared to 07/24/2024.  4.  Hepatic steatosis.

## 2024-07-31 ENCOUNTER — APPOINTMENT (OUTPATIENT)
Dept: MRI IMAGING | Facility: HOSPITAL | Age: 44
End: 2024-07-31
Payer: COMMERCIAL

## 2024-07-31 ENCOUNTER — APPOINTMENT (OUTPATIENT)
Dept: PHYSICAL THERAPY | Facility: HOSPITAL | Age: 44
End: 2024-07-31
Payer: COMMERCIAL

## 2024-07-31 LAB
ANION GAP SERPL CALCULATED.3IONS-SCNC: 10 MMOL/L (ref 7–15)
ATRIAL RATE - MUSE: 101 BPM
BUN SERPL-MCNC: 8.3 MG/DL (ref 6–20)
C PNEUM DNA SPEC QL NAA+PROBE: NOT DETECTED
CALCIUM SERPL-MCNC: 9 MG/DL (ref 8.8–10.4)
CHLORIDE SERPL-SCNC: 98 MMOL/L (ref 98–107)
CREAT SERPL-MCNC: 0.71 MG/DL (ref 0.67–1.17)
DIASTOLIC BLOOD PRESSURE - MUSE: NORMAL MMHG
EGFRCR SERPLBLD CKD-EPI 2021: >90 ML/MIN/1.73M2
ERYTHROCYTE [DISTWIDTH] IN BLOOD BY AUTOMATED COUNT: 12.3 % (ref 10–15)
FLUAV H1 2009 PAND RNA SPEC QL NAA+PROBE: NOT DETECTED
FLUAV H1 RNA SPEC QL NAA+PROBE: NOT DETECTED
FLUAV H3 RNA SPEC QL NAA+PROBE: NOT DETECTED
FLUAV RNA SPEC QL NAA+PROBE: NOT DETECTED
FLUBV RNA SPEC QL NAA+PROBE: NOT DETECTED
GLUCOSE SERPL-MCNC: 123 MG/DL (ref 70–99)
HADV DNA SPEC QL NAA+PROBE: NOT DETECTED
HCO3 SERPL-SCNC: 27 MMOL/L (ref 22–29)
HCOV PNL SPEC NAA+PROBE: NOT DETECTED
HCT VFR BLD AUTO: 37.5 % (ref 40–53)
HGB BLD-MCNC: 12.7 G/DL (ref 13.3–17.7)
HMPV RNA SPEC QL NAA+PROBE: NOT DETECTED
HOLD SPECIMEN: NORMAL
HPIV1 RNA SPEC QL NAA+PROBE: NOT DETECTED
HPIV2 RNA SPEC QL NAA+PROBE: NOT DETECTED
HPIV3 RNA SPEC QL NAA+PROBE: NOT DETECTED
HPIV4 RNA SPEC QL NAA+PROBE: NOT DETECTED
INTERPRETATION ECG - MUSE: NORMAL
M PNEUMO DNA SPEC QL NAA+PROBE: NOT DETECTED
MAGNESIUM SERPL-MCNC: 2 MG/DL (ref 1.7–2.3)
MCH RBC QN AUTO: 32.2 PG (ref 26.5–33)
MCHC RBC AUTO-ENTMCNC: 33.9 G/DL (ref 31.5–36.5)
MCV RBC AUTO: 95 FL (ref 78–100)
P AXIS - MUSE: 57 DEGREES
PHOSPHATE SERPL-MCNC: 4.2 MG/DL (ref 2.5–4.5)
PLATELET # BLD AUTO: 432 10E3/UL (ref 150–450)
POTASSIUM SERPL-SCNC: 3.7 MMOL/L (ref 3.4–5.3)
PR INTERVAL - MUSE: 166 MS
QRS DURATION - MUSE: 98 MS
QT - MUSE: 374 MS
QTC - MUSE: 484 MS
R AXIS - MUSE: 37 DEGREES
RBC # BLD AUTO: 3.94 10E6/UL (ref 4.4–5.9)
RSV RNA SPEC QL NAA+PROBE: NOT DETECTED
RSV RNA SPEC QL NAA+PROBE: NOT DETECTED
RV+EV RNA SPEC QL NAA+PROBE: NOT DETECTED
SODIUM SERPL-SCNC: 135 MMOL/L (ref 135–145)
SYSTOLIC BLOOD PRESSURE - MUSE: NORMAL MMHG
T AXIS - MUSE: 49 DEGREES
VENTRICULAR RATE- MUSE: 101 BPM
WBC # BLD AUTO: 10.5 10E3/UL (ref 4–11)

## 2024-07-31 PROCEDURE — 87633 RESP VIRUS 12-25 TARGETS: CPT | Performed by: STUDENT IN AN ORGANIZED HEALTH CARE EDUCATION/TRAINING PROGRAM

## 2024-07-31 PROCEDURE — 93010 ELECTROCARDIOGRAM REPORT: CPT | Performed by: INTERNAL MEDICINE

## 2024-07-31 PROCEDURE — 84100 ASSAY OF PHOSPHORUS: CPT | Performed by: STUDENT IN AN ORGANIZED HEALTH CARE EDUCATION/TRAINING PROGRAM

## 2024-07-31 PROCEDURE — 80048 BASIC METABOLIC PNL TOTAL CA: CPT | Performed by: STUDENT IN AN ORGANIZED HEALTH CARE EDUCATION/TRAINING PROGRAM

## 2024-07-31 PROCEDURE — 74183 MRI ABD W/O CNTR FLWD CNTR: CPT

## 2024-07-31 PROCEDURE — 99233 SBSQ HOSP IP/OBS HIGH 50: CPT | Performed by: STUDENT IN AN ORGANIZED HEALTH CARE EDUCATION/TRAINING PROGRAM

## 2024-07-31 PROCEDURE — 250N000013 HC RX MED GY IP 250 OP 250 PS 637: Performed by: SURGERY

## 2024-07-31 PROCEDURE — 85027 COMPLETE CBC AUTOMATED: CPT | Performed by: STUDENT IN AN ORGANIZED HEALTH CARE EDUCATION/TRAINING PROGRAM

## 2024-07-31 PROCEDURE — 93005 ELECTROCARDIOGRAM TRACING: CPT

## 2024-07-31 PROCEDURE — 83735 ASSAY OF MAGNESIUM: CPT | Performed by: STUDENT IN AN ORGANIZED HEALTH CARE EDUCATION/TRAINING PROGRAM

## 2024-07-31 PROCEDURE — 87581 M.PNEUMON DNA AMP PROBE: CPT | Performed by: STUDENT IN AN ORGANIZED HEALTH CARE EDUCATION/TRAINING PROGRAM

## 2024-07-31 PROCEDURE — A9585 GADOBUTROL INJECTION: HCPCS | Performed by: STUDENT IN AN ORGANIZED HEALTH CARE EDUCATION/TRAINING PROGRAM

## 2024-07-31 PROCEDURE — 250N000013 HC RX MED GY IP 250 OP 250 PS 637: Performed by: INTERNAL MEDICINE

## 2024-07-31 PROCEDURE — 97116 GAIT TRAINING THERAPY: CPT | Mod: GP

## 2024-07-31 PROCEDURE — 255N000002 HC RX 255 OP 636: Performed by: STUDENT IN AN ORGANIZED HEALTH CARE EDUCATION/TRAINING PROGRAM

## 2024-07-31 PROCEDURE — 120N000001 HC R&B MED SURG/OB

## 2024-07-31 PROCEDURE — 36415 COLL VENOUS BLD VENIPUNCTURE: CPT | Performed by: STUDENT IN AN ORGANIZED HEALTH CARE EDUCATION/TRAINING PROGRAM

## 2024-07-31 PROCEDURE — 97112 NEUROMUSCULAR REEDUCATION: CPT | Mod: GP

## 2024-07-31 PROCEDURE — 250N000011 HC RX IP 250 OP 636: Performed by: STUDENT IN AN ORGANIZED HEALTH CARE EDUCATION/TRAINING PROGRAM

## 2024-07-31 PROCEDURE — 250N000013 HC RX MED GY IP 250 OP 250 PS 637: Performed by: STUDENT IN AN ORGANIZED HEALTH CARE EDUCATION/TRAINING PROGRAM

## 2024-07-31 PROCEDURE — 250N000011 HC RX IP 250 OP 636: Performed by: INTERNAL MEDICINE

## 2024-07-31 RX ORDER — GADOBUTROL 604.72 MG/ML
8.5 INJECTION INTRAVENOUS ONCE
Status: COMPLETED | OUTPATIENT
Start: 2024-07-31 | End: 2024-07-31

## 2024-07-31 RX ORDER — LISINOPRIL 5 MG/1
10 TABLET ORAL DAILY
Status: DISCONTINUED | OUTPATIENT
Start: 2024-07-31 | End: 2024-08-01 | Stop reason: HOSPADM

## 2024-07-31 RX ORDER — POLYETHYLENE GLYCOL 3350 17 G/17G
17 POWDER, FOR SOLUTION ORAL 2 TIMES DAILY PRN
Status: DISCONTINUED | OUTPATIENT
Start: 2024-07-31 | End: 2024-08-01 | Stop reason: HOSPADM

## 2024-07-31 RX ORDER — POTASSIUM CHLORIDE 1500 MG/1
20 TABLET, EXTENDED RELEASE ORAL ONCE
Status: COMPLETED | OUTPATIENT
Start: 2024-07-31 | End: 2024-07-31

## 2024-07-31 RX ORDER — MAGNESIUM OXIDE 400 MG/1
400 TABLET ORAL EVERY 4 HOURS
Status: COMPLETED | OUTPATIENT
Start: 2024-07-31 | End: 2024-07-31

## 2024-07-31 RX ADMIN — LISINOPRIL 10 MG: 5 TABLET ORAL at 08:54

## 2024-07-31 RX ADMIN — HYDROMORPHONE HYDROCHLORIDE 0.3 MG: 1 INJECTION, SOLUTION INTRAMUSCULAR; INTRAVENOUS; SUBCUTANEOUS at 14:59

## 2024-07-31 RX ADMIN — LABETALOL HYDROCHLORIDE 10 MG: 5 INJECTION, SOLUTION INTRAVENOUS at 03:38

## 2024-07-31 RX ADMIN — GADOBUTROL 8.5 ML: 604.72 INJECTION INTRAVENOUS at 14:30

## 2024-07-31 RX ADMIN — FUROSEMIDE 20 MG: 10 INJECTION, SOLUTION INTRAMUSCULAR; INTRAVENOUS at 02:26

## 2024-07-31 RX ADMIN — HYDROMORPHONE HYDROCHLORIDE 0.3 MG: 1 INJECTION, SOLUTION INTRAMUSCULAR; INTRAVENOUS; SUBCUTANEOUS at 08:57

## 2024-07-31 RX ADMIN — SENNOSIDES AND DOCUSATE SODIUM 2 TABLET: 8.6; 5 TABLET ORAL at 10:23

## 2024-07-31 RX ADMIN — THIAMINE HCL TAB 100 MG 100 MG: 100 TAB at 08:43

## 2024-07-31 RX ADMIN — HYDROMORPHONE HYDROCHLORIDE 0.3 MG: 1 INJECTION, SOLUTION INTRAMUSCULAR; INTRAVENOUS; SUBCUTANEOUS at 02:26

## 2024-07-31 RX ADMIN — POLYETHYLENE GLYCOL 3350 17 G: 17 POWDER, FOR SOLUTION ORAL at 14:58

## 2024-07-31 RX ADMIN — MAGNESIUM OXIDE TAB 400 MG (241.3 MG ELEMENTAL MG) 400 MG: 400 (241.3 MG) TAB at 17:47

## 2024-07-31 RX ADMIN — MEROPENEM 1 G: 1 INJECTION, POWDER, FOR SOLUTION INTRAVENOUS at 02:26

## 2024-07-31 RX ADMIN — PANTOPRAZOLE SODIUM 40 MG: 40 TABLET, DELAYED RELEASE ORAL at 08:44

## 2024-07-31 RX ADMIN — MAGNESIUM OXIDE TAB 400 MG (241.3 MG ELEMENTAL MG) 400 MG: 400 (241.3 MG) TAB at 12:08

## 2024-07-31 RX ADMIN — POTASSIUM CHLORIDE 20 MEQ: 1500 TABLET, EXTENDED RELEASE ORAL at 12:03

## 2024-07-31 RX ADMIN — FOLIC ACID 1 MG: 1 TABLET ORAL at 08:44

## 2024-07-31 RX ADMIN — MEROPENEM 1 G: 1 INJECTION, POWDER, FOR SOLUTION INTRAVENOUS at 12:08

## 2024-07-31 RX ADMIN — DIAZEPAM 10 MG: 10 TABLET ORAL at 03:38

## 2024-07-31 RX ADMIN — ATORVASTATIN CALCIUM 40 MG: 40 TABLET, FILM COATED ORAL at 20:01

## 2024-07-31 RX ADMIN — THIAMINE HCL TAB 100 MG 100 MG: 100 TAB at 14:59

## 2024-07-31 RX ADMIN — Medication 1 TABLET: at 08:44

## 2024-07-31 RX ADMIN — THIAMINE HCL TAB 100 MG 100 MG: 100 TAB at 20:01

## 2024-07-31 RX ADMIN — MEROPENEM 1 G: 1 INJECTION, POWDER, FOR SOLUTION INTRAVENOUS at 17:51

## 2024-07-31 ASSESSMENT — ACTIVITIES OF DAILY LIVING (ADL)
ADLS_ACUITY_SCORE: 31
ADLS_ACUITY_SCORE: 31
ADLS_ACUITY_SCORE: 30
ADLS_ACUITY_SCORE: 31
ADLS_ACUITY_SCORE: 30
ADLS_ACUITY_SCORE: 30
ADLS_ACUITY_SCORE: 31
ADLS_ACUITY_SCORE: 30
ADLS_ACUITY_SCORE: 31
ADLS_ACUITY_SCORE: 30
ADLS_ACUITY_SCORE: 26
ADLS_ACUITY_SCORE: 31
ADLS_ACUITY_SCORE: 26
ADLS_ACUITY_SCORE: 30
ADLS_ACUITY_SCORE: 31
ADLS_ACUITY_SCORE: 29
ADLS_ACUITY_SCORE: 30

## 2024-07-31 NOTE — PROGRESS NOTES
[facility]    Medicine Progress Note - Hospitalist Service    Date of Admission:  7/23/2024    Assessment & Plan   Chandler Branch 43 yo M PMH alcohol use disorder, primary HTN, CT imaging evidence of CAD, GERD presented 7/23/24 for nausea, vomiting, & abdominal pain. Found to have alcohol withdrawal syndrome, acute alcoholic hepatitis, & pancreatitis with imaging evidence of hypo-enhancement concerning for necrosis without significant end-organ injury.     Severe acute pancreatitis -alcohol induced  SIRS response  -Presented with abdominal pain, elevated lipase  -Imaging with peripancreatic fat stranding, fluid and hypoenhancement of the pancreatic head  -Triglycerides not elevated, no signs of stone on CT  -Received early fluid resuscitation  -Not tolerate NGT placement for early enteral feeding  -Repeat CT 07/30 - mild decrease enhancement of pancreatic tail, possibly worsening compared to previous study. Pancreatic head and uncinate process without significant changes.  Significant peripancreatic fat stranding  -Seen by GI, appreciate recommendations  -Empiric meropenem for 7 days due to recurrent fevers  -MRCP pending  -MiraLAX twice daily as needed  -On low-fat diet    Alcohol withdrawal  EtOH abuse  -Last drink 07/23  -Patient required Precedex and phenobarbital, was in ICU  -CIWA-diazepam  -Thiamine, FA, MV  -Chemical dependency consult  -Inpatient treatment planned post discharge    Acute hypoxic respiratory failure - resolved  -Likely secondary to hypervolemia with resuscitation, atelectasis.  Possible underlying small airway disease  -7/26/24 CT PE demonstrated mild-moderate bilateral pleural effusions with adjacent atelectasis, mosaic attenuation, no PE   -07/30 CT shows small bilateral pleural effusions, associated basilar atelectasis/consolidation.  Small to moderate volume ascites, unchanged  -No evidence of pneumonia, procalcitonin normal  -Resp panel negative  -off IV fluids  and diuresis  -Incentive spirometry  -Wean oxygen as able    Hypokalemia  Hypomagnesemia  -Monitor and replete electrolytes as needed    Thrombocytopenia -resolved    Mild hyponatremia - improved  S/p diuresis  Monitor Na    HTN  -Resume Lisinopril, hydrochlorothiazide held  -As needed hydralazine and labetalol    CAD  -Imaging shows coronary artery calcification  -HbA1c - 5.7,   -Atorvastatin 40mg  -Cardiology referral outpatient    GERD  -PTA omeprazole    HLD  , will need high intensity statin  Atorvastatin 40mg          Diet: Low Saturated Fat Diet  Snacks/Supplements Adult: Ensure Max Protein (bariatric); With Meals  Snacks/Supplements Adult: Special [employer] Bar; Between Meals    DVT Prophylaxis: Enoxaparin (Lovenox) SQ  Mancia Catheter: Not present  Lines: None     Cardiac Monitoring: ACTIVE order. Indication: ICU  Code Status: Full Code      Clinically Significant Risk Factors              # Hypoalbuminemia: Lowest albumin = 2.8 g/dL at 7/27/2024  4:19 AM, will monitor as appropriate     # Hypertension: Noted on problem list         #Precipitous drop in Hgb/Hct: Lowest Hgb this hospitalization: 12 g/dL. Will continue to monitor and treat/transfuse as appropriate.         # Financial/Environmental Concerns: none         Disposition Plan     Medically Ready for Discharge: Anticipated Tomorrow             Petrona Reeder MD  Hospitalist Service  [facility]  Securely message with [employer] (more info)  Text page via [facility] Paging/Directory   ______________________________________________________________________    Interval History   Was examined at the bedside, continues to have abdominal pain.  Improved compared to prior.  Pending MRCP, continues to be tachycardic  MiraLAX for constipation    Physical Exam   Vital Signs: Temp: 98.5  F (36.9  C) Temp src: Oral BP: (!) 144/77 (RN notified) Pulse: 99   Resp: 20 SpO2: 97 % O2 Device: None (Room air)    Weight: 185 lbs 6.51 oz    General:   Appears stated age, no acute distress. A&O x 3.  Chest:  Breath sounds CTA and no increased work of breathing on room air.No rales or wheezes  Cardiovascular: Tachycardic, regular, no rub or murmur. No peripheral edema.  Abdomen:  Soft, TTP + diffuse L> R, non-distended.  Musculoskeletal:  Moves all four extremities. No muscle atrophy.  Neurological: No gross focal deficits    Medical Decision Making       50 MINUTES SPENT BY ME on the date of service doing chart review, history, exam, documentation & further activities per the note.      Data     I have personally reviewed the following data over the past 24 hrs:    10.5  \   12.7 (L)   / 432     135 98 8.3 /  123 (H)   3.7 27 0.71 \       Imaging results reviewed over the past 24 hrs:   No results found for this or any previous visit (from the past 24 hour(s)).

## 2024-07-31 NOTE — PLAN OF CARE
Problem: Comorbidity Management  Goal: Blood Pressure in Desired Range  Outcome: Progressing  Intervention: Maintain Blood Pressure Management  Recent Flowsheet Documentation  Taken 7/31/2024 0847 by Que Dykes RN  Medication Review/Management: medications reviewed     Problem: Pain Acute  Goal: Optimal Pain Control and Function  Outcome: Progressing  Intervention: Develop Pain Management Plan  Recent Flowsheet Documentation  Taken 7/31/2024 0926 by Que Dykes RN  Pain Management Interventions: medication (see MAR)  Intervention: Prevent or Manage Pain  Recent Flowsheet Documentation  Taken 7/31/2024 0847 by Que Dykes RN  Sensory Stimulation Regulation:   quiet environment promoted   television on  Bowel Elimination Promotion: ambulation promoted  Medication Review/Management: medications reviewed     Problem: Fall Injury Risk  Goal: Absence of Fall and Fall-Related Injury  Outcome: Progressing  Intervention: Identify and Manage Contributors  Recent Flowsheet Documentation  Taken 7/31/2024 0847 by Que Dykes RN  Medication Review/Management: medications reviewed  Intervention: Promote Injury-Free Environment  Recent Flowsheet Documentation  Taken 7/31/2024 0847 by Que Dykes RN  Safety Promotion/Fall Prevention:   room door open   safety round/check completed   clutter free environment maintained   Goal Outcome Evaluation:       BP was slightly high, scheduled lisinopril given bringing BP down. Other VSS. Denies n/v. Pt up independent and ambulating in hallways. Has pain in left upper abdomen. Dilaudid given x2. No medication required for CIWA protocol. No BM during shift, PRN senna and miralax given. Potassium and magnesium were replaced per protocols. Tele showed sinus tachy. MRCP completed.

## 2024-07-31 NOTE — PROGRESS NOTES
"CLINICAL NUTRITION SERVICES - REASSESSMENT NOTE     Nutrition Prescription    RECOMMENDATIONS FOR MDs/PROVIDERS TO ORDER:  -If low fat diet desired, recommend changing diet from low saturated fat to Low fat (50g/day)    Malnutrition Status:    Not noted at this time.    Recommendations already ordered by Registered Dietitian (RD):  -Discontinue ensure clear and gel plus d/t dislike  - trial special k bar bid and ensure max bid =660 kcal, 84 g protein, 18 g fat    Future/Additional Recommendations:  Adjust supplement pending intake, weight, tolerance, acceptance, labs  Continue to educate on healthy estimated nutrition needs       EVALUATION OF THE PROGRESS TOWARD GOALS   Diet: Low saturated Fat diet 7/26/24.  Intake: Poor  Pt eating 25-50% of meals.   AT breakfast today estimate intake 165 kcal, 9 g protein  Ate 25% of 3 meals yesterday    Meeting <50% of needs since admit    NEW FINDINGS   - spouse at bedside. Pt reports no appetite and has poor appetite at baseline. Per spouse pt has had digestive issues for awhile and has \"gotten into the habit\" of eating very little. Pt is focused on eating healthy foods and admits he is too busy at work to eat.   -Pt finds baseline low intake acceptable.   - Pt did not like the ensure clear or the gel plus.   -He is particular and does not like the food. Spouse brought subway which pt at only a couple of bites.   -Pt reports he does not take any supplements at home.   -Pt reports feeling well today. After providing nutrition and estimated needs education pt willing to try other supplements   -spouse feels pt does not eat enough and advocates for pt to take in more  -Pt expresses concern for gaining weight and staying fit as reason for low intake. Wife reports pt is always \"dieting\".     ANTHROPOMETRICS  At baseline weight  Date/Time Weight Weight Method   07/30/24 1900 84.1 kg (185 lb 6.5 oz) Bed scale   07/30/24 0400 85 kg (187 lb 8 oz) Bed scale   07/26/24 0548 93 kg (205 lb " 1.6 oz) Bed scale   07/25/24 0434 88.2 kg (194 lb 6.4 oz) Bed scale   07/24/24 1659 88.8 kg (195 lb 12.3 oz) Bed scale   07/23/24 2346 84.4 kg (186 lb) --       Dosing Weight: 84.4 kg     ASSESSED NUTRITION NEEDS  Estimated Energy Needs: 7773-4652 kcals/day (25 - 28 kcals/kg)  Justification: Maintenance  Estimated Protein Needs:  grams protein/day (1 - 1.5 grams of pro/kg)  Justification: Increased needs  Estimated Fluid Needs: 9094-4334 mL/day (1 mL/kcal)   Justification: Maintenance    GI  Last BM 7/29    LABS  No concerns. Lytes WNL    MEDICATIONS  Reviewed  Folic acid, magox today, iv abx, mvi with minerals, protonix, kcl today, thiamine 100 mg x 10 days    MALNUTRITION:  % Weight Loss:  None noted  % Intake:  </= 50% for >/= 5 days (severe malnutrition)   Subcutaneous Fat Loss:  None observed  Muscle Loss:  None observed  Fluid Retention:  None noted    Malnutrition Diagnosis: Patient does not meet two of the above criteria necessary for diagnosing malnutrition    CURRENT NUTRITION DIAGNOSIS  Inadequate oral intake related to altered GI function (pancreatitis), poor appetite, as evidenced by nausea, decreased intake.      INTERVENTIONS  Implementation  -Educated pt on baseline estimated nutrition for proper nutrition and increased nutrition needs while trying to recover. Recommended small frequent meals and reviewed high protein foods    -If low fat diet desired, recommend changing diet from low saturated fat to Low fat (50g/day)    -Discontinue ensure clear and gel plus d/t dislike  - trial special k bar bid and ensure max bid =660 kcal, 84 g protein, 18 g fat    -Some concern for eating disorder at home    Goals  Patient to consume % of nutritionally adequate meals three times per day, or the equivalent with supplements/snacks.-not progressing  Maintain wt.- progressing-diuresing  Electrolytes WNL- met    Monitoring/Evaluation  Progress toward goals will be monitored and evaluated per  protocol.

## 2024-07-31 NOTE — PROGRESS NOTES
"GI PROGRESS NOTE  7/31/2024  Chandler KHAN Sarina  1980  /-48    Subjective:   Still noting a dull ache in the upper abdomen that worsens with any movement or coughing. No nausea or vomiting. Tolerating a diet and does not feel this is worsening pain. No fevers or chills. No BM for two days, passing flatulence.     Family in the room during visit.      Objective:     Blood pressure (!) 167/97, pulse 105, temperature 98.5  F (36.9  C), temperature source Oral, resp. rate 20, height 1.88 m (6' 2\"), weight 84.1 kg (185 lb 6.5 oz), SpO2 98%.    Body mass index is 23.8 kg/m .  Gen: NAD  GI: Non-distended, BS positive, soft, tenderness in the LUQ  Skin: no jaundice    Laboratory:    BMP  Recent Labs   Lab 07/31/24  0720 07/30/24  1332 07/30/24  1038 07/30/24  0812 07/30/24  0450 07/29/24  1059 07/29/24  0854     --   --   --  133*  --  137   POTASSIUM 3.7  --  4.1  --  3.4   < > 2.9*   CHLORIDE 98  --   --   --  98  --  99   NICKI 9.0  --   --   --  8.5*  --  8.7*   CO2 27  --   --   --  24  --  26   BUN 8.3  --   --   --  7.4  --  8.7   CR 0.71  --   --   --  0.61*  --  0.57*   * 137*  --  116* 136*   < > 141*    < > = values in this interval not displayed.     CBC  Recent Labs   Lab 07/31/24  0720 07/30/24  0450 07/29/24  0854   WBC 10.5 9.7 8.9   RBC 3.94* 3.71* 4.22*   HGB 12.7* 12.0* 13.4   HCT 37.5* 34.4* 39.9*   MCV 95 93 95   MCH 32.2 32.3 31.8   MCHC 33.9 34.9 33.6   RDW 12.3 12.0 12.3    304 227      LFTs  Recent Labs   Lab Test 07/28/24  0711 07/27/24  0419 07/25/24  1025 07/25/24  0453 07/24/24  0801 07/24/24  0003 04/10/24  1556 02/24/22  0637 02/23/22  1335   ALBUMIN 2.9* 2.8*  --  3.4* 4.2 4.5 4.7   < >  --    BILITOTAL 0.8 1.1  --  1.1 0.6 0.5 0.4   < >  --    ALT 35 44  --  90* 163* 203* 176*   < >  --    AST 35 46*  --  84* 200* 274* 272*   < >  --    PROTEIN  --   --  50*  --   --   --   --   --  Negative   LIPASE  --   --   --   --  1,425* 1,207* 117*   < >  --     < > = " values in this interval not displayed.     Imaging:  EXAM: CT ABDOMEN PELVIS W CONTRAST  LOCATION: [facility]  DATE: 07/30/2024     INDICATION: Pancreatitis with necrosis.  COMPARISON: CT abdomen and pelvis on 07/24/2024.  TECHNIQUE: CT scan of the abdomen and pelvis was performed after the injection of Isovue 370, 90 mL intravenously. Multiplanar reformats were obtained. Dose reduction techniques were used.     FINDINGS:   LOWER CHEST: Small bilateral pleural effusions and associated basilar atelectasis/consolidation, new as compared to 07/24/2024.     ABDOMEN/PELVIS:  HEPATOBILIARY: Mild diffuse hypodense appearance of the liver, likely due to underlying hepatic steatosis. No suspicious focal hepatic lesion. No radiodense gallstones.     PANCREAS: Mild decreased enhancement of the pancreatic head and uncinate process and to a lesser extent pancreatic tail, not significantly changed in the head and uncinate process as compared to 07/24/2024 and slightly worsened in the pancreatic tail as   compared to the same study. Significant peripancreatic fat stranding and fluid is again noted.     SPLEEN: No splenomegaly.     ADRENAL GLANDS: No adrenal nodules.     KIDNEYS/BLADDER: Contrast within the renal collecting system, precludes detailed evaluation for kidney stones. No hydronephrosis in either kidney. Small focus of gas within the urinary bladder, could be related to recent catheterization.     BOWEL: No abnormally dilated bowel loops.     PERITONEUM: Small to moderate volume ascites, not significantly changed as compared to 07/24/2024 exam.     PELVIC ORGANS: Unremarkable.     VASCULATURE: Moderate atherosclerotic vascular calcification of the abdominal aorta and iliac arteries.     LYMPH NODES: Multiple prominent upper abdominal and retroperitoneal lymph nodes, indeterminate, could be reactive.     MUSCULOSKELETAL: Multilevel degenerative changes of the spine. No suspicious osseous lesion.                                                                     IMPRESSION:   1.  No significant change in the area of decreased enhancement in the pancreatic head and uncinate process as compared to 07/24/2024 exam, suggesting area of pancreatic necrosis. Mild decreased enhancement of the pancreatic tail, new/worsened as compared to same study, could also represent area of pancreatic necrosis. Significant peripancreatic fat stranding/fluid, related to patient's history of acute pancreatitis.  2.  Small to moderate volume ascites, not significantly changed as compared to 07/24/2024.  3.  Small bilateral pleural effusions and associated basilar atelectasis/consolidation, new as compared to 07/24/2024.  4.  Hepatic steatosis.    EXAM: CT ABDOMEN PELVIS W CONTRAST  LOCATION: [facility]  DATE: 7/24/2024     INDICATION: acute pancreatitis  COMPARISON: None.  TECHNIQUE: CT scan of the abdomen and pelvis was performed following injection of IV contrast. Multiplanar reformats were obtained. Dose reduction techniques were used.  CONTRAST: IsoVue 370 90mL     FINDINGS:   LOWER CHEST: Basilar atelectasis.     HEPATOBILIARY: Hepatic steatosis.     PANCREAS: Peripancreatic inflammation and adjacent fluid consistent with acute pancreatitis. Prominent area of hypoenhancement pancreatic head and uncinate process as 3 image 1074.3 x 2.9 cm in size.     SPLEEN: Normal.     ADRENAL GLANDS: Normal.     KIDNEYS/BLADDER: Normal.     BOWEL: Duodenal wall thickening and wall thickening hepatic flexure of colon presumably secondary to pancreatic inflammation. Bowel is normal caliber.     LYMPH NODES: Borderline left retroperitoneal lymph node.     VASCULATURE: Atherosclerotic vascular calcification     PELVIC ORGANS: Small amount of pelvic free fluid. Postsurgical change scrotum.     MUSCULOSKELETAL: Degenerative change osseous structures.                                                                    IMPRESSION:   1.  Acute pancreatitis. Prominent hypoenhancement pancreatic head and uncinate process could be due to area of necrotizing pancreatitis. Follow-up to confirm resolution is necessary to exclude underlying mass.  2.  Wall thickening duodenum and hepatic flexure of colon presumably secondary to pancreatic inflammation.  3.  Borderline left retroperitoneal lymph nodes.     Assessment:   Severe necrotizing pancreatitis   Alcohol withdrawal  43 yo M PMH alcohol use disorder, primary HTN, CT imaging evidence of CAD, GERD who presented 7/23/24 for nausea, vomiting, & abdominal pain. Found to have alcohol withdrawal syndrome, acute alcoholic hepatitis, & pancreatitis.  -Repeat CT yesterday with findings noted above.   -Patient still with significant pain today, MRCP ordered to rule out pancreatitic leak with cocurrent ascites present. CT without notation of increased ascites.   -LFTs normalized.   -Afebrile and hemodynamically stable today.      Plan:   1. Low fat diet as tolerated  2. MRCP today  3. Pain control and supportive therapies per primary   4. Repeat cultures at 1 week, if negative, likely can stop meropenem.  5. Miralax 17g BID prn   6. GI will continue to follow, please call with questions or changes.     Discussed with Dr. Tiffanie West, CNP  Thank you for the opportunity to participate in the care of this patient.   Please feel free to call me with any questions or concerns.  Phone number [phone].

## 2024-07-31 NOTE — PROGRESS NOTES
Cross Cover Note-    RN sent message saying new ST elevation on telemetry monitor, but not symptomatic. STAT 12 lead EKG and STAT troponin ordered.    Addendum:    STAT EKG reviewed. YE=083. Sinus Rhythm. No ST elevation or depression.     Patient remains asymptomatic.    Plan: Cancel troponin and continue telemetry monitoring.

## 2024-07-31 NOTE — PLAN OF CARE
Problem: Adult Inpatient Plan of Care  Goal: Optimal Comfort and Wellbeing  7/31/2024 0345 by Siomara Arteaga RN  Outcome: Progressing  7/30/2024 2039 by Siomara Arteaga RN  Outcome: Progressing  Intervention: Monitor Pain and Promote Comfort  Recent Flowsheet Documentation  Taken 7/31/2024 0000 by Siomara Arteaga RN  Pain Management Interventions: declines  Taken 7/30/2024 2000 by Siomara Arteaga RN  Pain Management Interventions: medication (see MAR)  Intervention: Provide Person-Centered Care  Recent Flowsheet Documentation  Taken 7/31/2024 0000 by Siomara Arteaga RN  Trust Relationship/Rapport: care explained  Taken 7/30/2024 2000 by Siomara Arteaga RN  Trust Relationship/Rapport: care explained   Goal Outcome Evaluation:    Patient AO x4, pain in head and abdomen intermittently as well as nausea. Tele sinus tach, ST elevation noted on tele so stat EKG ordered and normal. IV abx running per order. Hypertensive overnight and prns given x2, CIWA score warranted prns x1.     FMLA paperwork put into patients binder in discharge section and sticky note left for providers.     Siomara Arteaga RN

## 2024-07-31 NOTE — PROVIDER NOTIFICATION
To Dr Oreilly: Patient is having new ST elevation on the tele monitor, we did switch the leads and its continuing. Not symptomatic, received labetalol just over an hour ago for hypertension.

## 2024-07-31 NOTE — PROGRESS NOTES
Physical Therapy Discharge Summary    Reason for therapy discharge:    All goals and outcomes met, no further needs identified.    Progress towards therapy goal(s). See goals on Care Plan in [facility] electronic health record for goal details.  Goals met    Therapy recommendation(s):    No further therapy is recommended.    Archer Balance Scale (BBS)  Patient Score: 52/56  Archer Balance Scale (BBS) Cutoff Scores: A score of ? 45/56 indicates an increased risk for falls, or  A score of <51/56 indicates an increased risk for falls in persons with history of falls. A score of <42/56 indicates an increased risk for falls in persons without history of falls. Please note that a score of <40/56 is nearly 100% predictive of a fall occuring in the future.     Score Interpretation/Recommendation: not at significant risk of falls    The BBS is a measure of static and dynamic standing balance that has been validated in community dwelling elderly individuals and individuals who have Parkinson's Disease, MS, and those who are s/p CVA and TBI. The test is administered without an assistive device. Scores from the Archer are used to determine the probability of falling based on the patient's previous history of falls and their test performance.     Cyndee Kc, PT, DPT, 7/31/2024

## 2024-08-01 VITALS
DIASTOLIC BLOOD PRESSURE: 74 MMHG | TEMPERATURE: 98.7 F | WEIGHT: 181.91 LBS | HEART RATE: 109 BPM | SYSTOLIC BLOOD PRESSURE: 133 MMHG | RESPIRATION RATE: 18 BRPM | OXYGEN SATURATION: 98 % | BODY MASS INDEX: 23.34 KG/M2 | HEIGHT: 74 IN

## 2024-08-01 PROBLEM — R11.0 NAUSEA: Status: ACTIVE | Noted: 2024-08-01

## 2024-08-01 LAB
ALBUMIN UR-MCNC: NEGATIVE MG/DL
ANION GAP SERPL CALCULATED.3IONS-SCNC: 10 MMOL/L (ref 7–15)
APPEARANCE UR: CLEAR
BILIRUB UR QL STRIP: NEGATIVE
BUN SERPL-MCNC: 7.7 MG/DL (ref 6–20)
CALCIUM SERPL-MCNC: 9.1 MG/DL (ref 8.8–10.4)
CHLORIDE SERPL-SCNC: 97 MMOL/L (ref 98–107)
COLOR UR AUTO: YELLOW
CREAT SERPL-MCNC: 0.72 MG/DL (ref 0.67–1.17)
EGFRCR SERPLBLD CKD-EPI 2021: >90 ML/MIN/1.73M2
ERYTHROCYTE [DISTWIDTH] IN BLOOD BY AUTOMATED COUNT: 12.2 % (ref 10–15)
GLUCOSE SERPL-MCNC: 162 MG/DL (ref 70–99)
GLUCOSE UR STRIP-MCNC: NEGATIVE MG/DL
HCO3 SERPL-SCNC: 27 MMOL/L (ref 22–29)
HCT VFR BLD AUTO: 36.4 % (ref 40–53)
HGB BLD-MCNC: 12.1 G/DL (ref 13.3–17.7)
HGB UR QL STRIP: NEGATIVE
KETONES UR STRIP-MCNC: NEGATIVE MG/DL
LEUKOCYTE ESTERASE UR QL STRIP: NEGATIVE
MAGNESIUM SERPL-MCNC: 1.9 MG/DL (ref 1.7–2.3)
MCH RBC QN AUTO: 31.8 PG (ref 26.5–33)
MCHC RBC AUTO-ENTMCNC: 33.2 G/DL (ref 31.5–36.5)
MCV RBC AUTO: 96 FL (ref 78–100)
NITRATE UR QL: NEGATIVE
PH UR STRIP: 7.5 [PH] (ref 5–7)
PLATELET # BLD AUTO: 465 10E3/UL (ref 150–450)
POTASSIUM SERPL-SCNC: 4.3 MMOL/L (ref 3.4–5.3)
RBC # BLD AUTO: 3.8 10E6/UL (ref 4.4–5.9)
RBC URINE: <1 /HPF
SODIUM SERPL-SCNC: 134 MMOL/L (ref 135–145)
SP GR UR STRIP: 1.02 (ref 1–1.03)
UROBILINOGEN UR STRIP-MCNC: 2 MG/DL
WBC # BLD AUTO: 11.1 10E3/UL (ref 4–11)
WBC URINE: 1 /HPF

## 2024-08-01 PROCEDURE — 83735 ASSAY OF MAGNESIUM: CPT | Performed by: STUDENT IN AN ORGANIZED HEALTH CARE EDUCATION/TRAINING PROGRAM

## 2024-08-01 PROCEDURE — 250N000011 HC RX IP 250 OP 636: Performed by: INTERNAL MEDICINE

## 2024-08-01 PROCEDURE — 85049 AUTOMATED PLATELET COUNT: CPT | Performed by: STUDENT IN AN ORGANIZED HEALTH CARE EDUCATION/TRAINING PROGRAM

## 2024-08-01 PROCEDURE — 36415 COLL VENOUS BLD VENIPUNCTURE: CPT | Performed by: STUDENT IN AN ORGANIZED HEALTH CARE EDUCATION/TRAINING PROGRAM

## 2024-08-01 PROCEDURE — 250N000013 HC RX MED GY IP 250 OP 250 PS 637: Performed by: SURGERY

## 2024-08-01 PROCEDURE — 80048 BASIC METABOLIC PNL TOTAL CA: CPT | Performed by: STUDENT IN AN ORGANIZED HEALTH CARE EDUCATION/TRAINING PROGRAM

## 2024-08-01 PROCEDURE — 99239 HOSP IP/OBS DSCHRG MGMT >30: CPT | Performed by: STUDENT IN AN ORGANIZED HEALTH CARE EDUCATION/TRAINING PROGRAM

## 2024-08-01 PROCEDURE — 81001 URINALYSIS AUTO W/SCOPE: CPT | Performed by: STUDENT IN AN ORGANIZED HEALTH CARE EDUCATION/TRAINING PROGRAM

## 2024-08-01 PROCEDURE — 250N000011 HC RX IP 250 OP 636: Performed by: STUDENT IN AN ORGANIZED HEALTH CARE EDUCATION/TRAINING PROGRAM

## 2024-08-01 PROCEDURE — 250N000013 HC RX MED GY IP 250 OP 250 PS 637: Performed by: INTERNAL MEDICINE

## 2024-08-01 PROCEDURE — 250N000013 HC RX MED GY IP 250 OP 250 PS 637: Performed by: STUDENT IN AN ORGANIZED HEALTH CARE EDUCATION/TRAINING PROGRAM

## 2024-08-01 RX ORDER — LANOLIN ALCOHOL/MO/W.PET/CERES
100 CREAM (GRAM) TOPICAL DAILY
Qty: 90 TABLET | Refills: 1 | Status: SHIPPED | OUTPATIENT
Start: 2024-08-03

## 2024-08-01 RX ORDER — POLYETHYLENE GLYCOL 3350 17 G/17G
17 POWDER, FOR SOLUTION ORAL DAILY
Qty: 510 G | Refills: 0 | Status: SHIPPED | OUTPATIENT
Start: 2024-08-01

## 2024-08-01 RX ORDER — OXYCODONE HYDROCHLORIDE 5 MG/1
5 TABLET ORAL EVERY 4 HOURS PRN
Status: DISCONTINUED | OUTPATIENT
Start: 2024-08-01 | End: 2024-08-01 | Stop reason: HOSPADM

## 2024-08-01 RX ORDER — FUROSEMIDE 20 MG
40 TABLET ORAL ONCE
Status: COMPLETED | OUTPATIENT
Start: 2024-08-01 | End: 2024-08-01

## 2024-08-01 RX ORDER — FOLIC ACID 1 MG/1
1 TABLET ORAL DAILY
Qty: 90 TABLET | Refills: 1 | Status: SHIPPED | OUTPATIENT
Start: 2024-08-02

## 2024-08-01 RX ORDER — MULTIPLE VITAMINS W/ MINERALS TAB 9MG-400MCG
1 TAB ORAL DAILY
Qty: 90 TABLET | Refills: 1 | Status: SHIPPED | OUTPATIENT
Start: 2024-08-02

## 2024-08-01 RX ORDER — HYDROCHLOROTHIAZIDE 12.5 MG/1
12.5 TABLET ORAL DAILY
Status: DISCONTINUED | OUTPATIENT
Start: 2024-08-01 | End: 2024-08-01 | Stop reason: HOSPADM

## 2024-08-01 RX ORDER — ONDANSETRON 4 MG/1
4 TABLET, ORALLY DISINTEGRATING ORAL EVERY 6 HOURS PRN
Qty: 10 TABLET | Refills: 0 | Status: SHIPPED | OUTPATIENT
Start: 2024-08-01

## 2024-08-01 RX ORDER — OXYCODONE HYDROCHLORIDE 5 MG/1
5 TABLET ORAL EVERY 6 HOURS PRN
Qty: 20 TABLET | Refills: 0 | Status: SHIPPED | OUTPATIENT
Start: 2024-08-01

## 2024-08-01 RX ORDER — ATORVASTATIN CALCIUM 40 MG/1
40 TABLET, FILM COATED ORAL EVERY EVENING
Qty: 90 TABLET | Refills: 1 | Status: SHIPPED | OUTPATIENT
Start: 2024-08-01

## 2024-08-01 RX ADMIN — POLYETHYLENE GLYCOL 3350 17 G: 17 POWDER, FOR SOLUTION ORAL at 08:14

## 2024-08-01 RX ADMIN — THIAMINE HCL TAB 100 MG 100 MG: 100 TAB at 08:15

## 2024-08-01 RX ADMIN — PANTOPRAZOLE SODIUM 40 MG: 40 TABLET, DELAYED RELEASE ORAL at 08:14

## 2024-08-01 RX ADMIN — HYDROMORPHONE HYDROCHLORIDE 0.3 MG: 1 INJECTION, SOLUTION INTRAMUSCULAR; INTRAVENOUS; SUBCUTANEOUS at 08:27

## 2024-08-01 RX ADMIN — FUROSEMIDE 40 MG: 20 TABLET ORAL at 13:07

## 2024-08-01 RX ADMIN — LISINOPRIL 10 MG: 5 TABLET ORAL at 08:14

## 2024-08-01 RX ADMIN — MEROPENEM 1 G: 1 INJECTION, POWDER, FOR SOLUTION INTRAVENOUS at 00:56

## 2024-08-01 RX ADMIN — HYDROMORPHONE HYDROCHLORIDE 0.3 MG: 1 INJECTION, SOLUTION INTRAMUSCULAR; INTRAVENOUS; SUBCUTANEOUS at 00:57

## 2024-08-01 RX ADMIN — THIAMINE HCL TAB 100 MG 100 MG: 100 TAB at 13:07

## 2024-08-01 RX ADMIN — Medication 1 TABLET: at 08:14

## 2024-08-01 RX ADMIN — HYDROCHLOROTHIAZIDE 12.5 MG: 12.5 TABLET ORAL at 13:12

## 2024-08-01 RX ADMIN — FOLIC ACID 1 MG: 1 TABLET ORAL at 08:15

## 2024-08-01 RX ADMIN — HYDROMORPHONE HYDROCHLORIDE 0.3 MG: 1 INJECTION, SOLUTION INTRAMUSCULAR; INTRAVENOUS; SUBCUTANEOUS at 03:12

## 2024-08-01 RX ADMIN — ENOXAPARIN SODIUM 40 MG: 40 INJECTION SUBCUTANEOUS at 13:07

## 2024-08-01 RX ADMIN — ONDANSETRON 4 MG: 4 TABLET, ORALLY DISINTEGRATING ORAL at 00:55

## 2024-08-01 ASSESSMENT — ACTIVITIES OF DAILY LIVING (ADL)
ADLS_ACUITY_SCORE: 26

## 2024-08-01 NOTE — DISCHARGE SUMMARY
[facility]    Hospitalist Discharge Summary       Date of Admission:  7/23/2024  Date of Discharge:  8/1/2024  4:05 PM  Discharging Provider: Petrona Reeder MD      Discharge Diagnoses   Severe acute pancreatitis -alcohol induced  Alcohol withdrawal  Alcohol abuse  HTN  CAD  GERD  HLD      Follow-ups Needed After Discharge   Follow-up Appointments     Follow-up and recommended labs and tests       Follow up with primary care provider, Mame Dougherty, within 7 days to   evaluate medication change and for hospital follow- up.  The following   labs/tests are recommended: WBC, BMP.    Follow up with Gastroenterology, appointment will be made. Outpatient   repeat CT in 2 months  Follow up with Cardiology for advanced coronary artery calcification,   referral provided  Follow up with inpatient rehab as patient desires    Call our scheduling line at [phone] to make an   appointment, if you do not already have one scheduled            Unresulted Labs Ordered in the Past 30 Days of this Admission       No orders found from 6/23/2024 to 7/24/2024.            Hospital Course   Chandler Branch 45 yo M PMH alcohol use disorder, primary HTN, CT imaging evidence of CAD, GERD presented 7/23/24 for nausea, vomiting, & abdominal pain. Found to have alcohol withdrawal syndrome, acute alcoholic hepatitis, & pancreatitis with imaging evidence of hypo-enhancement concerning for necrosis     Severe acute pancreatitis -alcohol induced  SIRS response  -Mild leukocytosis  -Imaging with peripancreatic fat stranding, fluid and hypoenhancement of the pancreatic head  -Triglycerides not elevated, no signs of stone on CT  -Received early fluid resuscitation  -Not tolerate NGT placement for early enteral feeding  -Repeat CT 07/30 - mild decrease enhancement of pancreatic tail, possibly worsening compared to previous study. Pancreatic head and uncinate process without significant changes.  Significant peripancreatic fat  stranding  -Seen by GI  -s/p Empiric meropenem for 7 days due to recurrent fevers  -MRCP 07/31 diffusely hypoenhancing pancreatic parenchyma which could reflect necrosis. No evidence of thrombus, pseudoaneurysm, or obvious pancreatic/biliary leak   -Re - minimal Ascites, diagnostic paracentesis was discussed by GI, but clinically improving, given dose of lasix  -MiraLAX twice daily as needed  -On low-fat diet  -Oxycodone prn for pain  -Follow up with GI outpatient and need repeat CT in 2 months     Alcohol withdrawal  EtOH abuse  -Last drink 07/23  -Patient required Precedex and phenobarbital, was in ICU  -[facility], was on diazepam protocol  -Thiamine, FA, MV  -Chemical dependency consulted  -Inpatient treatment planned post discharge     Acute hypoxic respiratory failure - resolved  -Likely secondary to hypervolemia with resuscitation, atelectasis.  Possible underlying small airway disease  -7/26/24 CT PE demonstrated mild-moderate bilateral pleural effusions with adjacent atelectasis, mosaic attenuation, no PE   -07/30 CT shows small bilateral pleural effusions, associated basilar atelectasis/consolidation.  Small to moderate volume ascites, unchanged  -No evidence of pneumonia, procalcitonin normal  -Resp panel negative  -off IV fluids and diuresis  -Incentive spirometry     Hypokalemia, Hypomagnesemia - resolved  -Monitor and replete electrolytes as needed     Thrombocytopenia -resolved     Mild hyponatremia - improved  S/p diuresis  Follow up with PCP     HTN  -Resume Lisinopril, hydrochlorothiazide     CAD  -Imaging shows coronary artery calcification  -HbA1c - 5.7,   -Started Atorvastatin 40mg  -Cardiology referral outpatient     GERD  -PTA omeprazole     HLD  , will need high intensity statin  Atorvastatin 40mg    Consultations This Hospital Stay   HOSPITALIST IP CONSULT  CHEMICAL DEPENDENCY IP CONSULT  GASTROENTEROLOGY IP CONSULT  CARE MANAGEMENT / SOCIAL WORK IP CONSULT  CHEMICAL DEPENDENCY IP  CONSULT  NUTRITION SERVICES ADULT IP CONSULT  INTERVENTIONAL RADIOLOGY ADULT/PEDS IP CONSULT  PHARMACY IP CONSULT  PHARMACY TO DOSE VANCO  PHYSICAL THERAPY ADULT IP CONSULT  PHYSICAL THERAPY ADULT IP CONSULT  HOSPITALIST IP CONSULT  ADDICTION MEDICINE INPATIENT CONSULT  CHEMICAL DEPENDENCY IP CONSULT  CARE MANAGEMENT / SOCIAL WORK IP CONSULT  SPIRITUAL HEALTH SERVICES IP CONSULT    Code Status   Full Code    Time Spent on this Encounter   I,Petrona Lilli, personally saw the patient today and spent approximately 35 minutes discharging this patient.       Petrona Reeder MD  [facility]  ______________________________________________________________________    Physical Exam   Vital Signs: Temp: 98.7  F (37.1  C) Temp src: Oral BP: 133/74 Pulse: 109   Resp: 18 SpO2: 98 % O2 Device: None (Room air)    Weight: 181 lbs 14.56 oz    Physical Exam    General:  Appears stated age, no acute distress. A&O x 3.  Chest:  Breath sounds CTA and no increased work of breathing on room air.No rales or wheezes  Cardiovascular: RRR, no rub or murmur. No peripheral edema.  Abdomen:  Soft, TTP + diffuse L> R, improed significantly, non-distended.  Musculoskeletal:  Moves all four extremities. No muscle atrophy.  Neurological: No gross focal deficits      Primary Care Physician   Mame Dougherty    Discharge Disposition   Discharged to home  Condition at discharge: Stable    Significant Results and Procedures   Most Recent 3 CBC's:  Recent Labs   Lab Test 08/01/24  0925 07/31/24  0720 07/30/24  0450   WBC 11.1* 10.5 9.7   HGB 12.1* 12.7* 12.0*   MCV 96 95 93   * 432 304     Most Recent 3 BMP's:  Recent Labs   Lab Test 08/01/24  0925 07/31/24  0720 07/30/24  1332 07/30/24  1038 07/30/24  0812 07/30/24  0450   * 135  --   --   --  133*   POTASSIUM 4.3 3.7  --  4.1  --  3.4   CHLORIDE 97* 98  --   --   --  98   CO2 27 27  --   --   --  24   BUN 7.7 8.3  --   --   --  7.4   CR 0.72 0.71  --   --   --   0.61*   ANIONGAP 10 10  --   --   --  11   NICKI 9.1 9.0  --   --   --  8.5*   * 123* 137*  --    < > 136*    < > = values in this interval not displayed.     Most Recent 2 LFT's:  Recent Labs   Lab Test 07/28/24  0711 07/27/24  0419   AST 35 46*   ALT 35 44   ALKPHOS 111 81   BILITOTAL 0.8 1.1     Most Recent 3 INR's:No lab results found.  Most Recent 3 Troponin's:No lab results found.  Most Recent 3 BNP's:  Recent Labs   Lab Test 07/27/24  0419   NTBNPI 1,289*     Most Recent D-dimer:  Recent Labs   Lab Test 07/26/24  1303   DD 10.09*     Most Recent Cholesterol Panel:  Recent Labs   Lab Test 07/30/24  0450   CHOL 166   *   HDL 17*   TRIG 194*       Results for orders placed or performed during the hospital encounter of 07/23/24   CT Abdomen Pelvis w Contrast    Narrative    EXAM: CT ABDOMEN PELVIS W CONTRAST  LOCATION: [facility]  DATE: 7/24/2024    INDICATION: acute pancreatitis  COMPARISON: None.  TECHNIQUE: CT scan of the abdomen and pelvis was performed following injection of IV contrast. Multiplanar reformats were obtained. Dose reduction techniques were used.  CONTRAST: IsoVue 370 90mL    FINDINGS:   LOWER CHEST: Basilar atelectasis.    HEPATOBILIARY: Hepatic steatosis.    PANCREAS: Peripancreatic inflammation and adjacent fluid consistent with acute pancreatitis. Prominent area of hypoenhancement pancreatic head and uncinate process as 3 image 1074.3 x 2.9 cm in size.    SPLEEN: Normal.    ADRENAL GLANDS: Normal.    KIDNEYS/BLADDER: Normal.    BOWEL: Duodenal wall thickening and wall thickening hepatic flexure of colon presumably secondary to pancreatic inflammation. Bowel is normal caliber.    LYMPH NODES: Borderline left retroperitoneal lymph node.    VASCULATURE: Atherosclerotic vascular calcification    PELVIC ORGANS: Small amount of pelvic free fluid. Postsurgical change scrotum.    MUSCULOSKELETAL: Degenerative change osseous structures.      Impression     IMPRESSION:   1.  Acute pancreatitis. Prominent hypoenhancement pancreatic head and uncinate process could be due to area of necrotizing pancreatitis. Follow-up to confirm resolution is necessary to exclude underlying mass.  2.  Wall thickening duodenum and hepatic flexure of colon presumably secondary to pancreatic inflammation.  3.  Borderline left retroperitoneal lymph nodes.   XR Nasal Gastric Tube Placement    Narrative    EXAM: XR NASAL GASTRIC TUBE PLACEMENT  LOCATION: [facility]  DATE: 7/24/2024    INDICATION: Need NJ tube for severe pancreatitis  COMPARISON: CT 7/24/2024    TECHNIQUE: Intraoperative fluoroscopy performed during the patient's procedure.    RADIATION DOSE: Total Air Kerma 9.9 mGy    FINDINGS: Utilizing fluoroscopic guidance, an enteric feeding tube was placed with the tip located in the distal gastric antrum. Despite multiple attempts, the tip could not be advanced beyond the pylorus.   XR Chest Port 1 View    Narrative    EXAM: XR CHEST PORT 1 VIEW  LOCATION: [facility]  DATE: 7/25/2024    INDICATION: Emesis. Assess for pneumonia.  COMPARISON: 2/23/2022      Impression    IMPRESSION: Heart size magnified in AP projection with normal vascularity. Shallow inspiration accentuates bibasilar subsegmental atelectasis. No pneumothorax nor pleural effusion.   CT Chest Pulmonary Embolism w Contrast    Narrative    EXAM: CT CHEST PULMONARY EMBOLISM W CONTRAST  LOCATION: [facility]  DATE: 7/26/2024    INDICATION: To r/o PE with tachycardia and hypoxia.  COMPARISON: None.  TECHNIQUE: CT chest pulmonary angiogram during arterial phase injection of IV contrast. Multiplanar reformats and MIP reconstructions were performed. Dose reduction techniques were used.   CONTRAST: 90 mL Isovue 370    FINDINGS:  ANGIOGRAM CHEST: Pulmonary arteries are normal caliber and negative for pulmonary emboli. Thoracic aorta is negative for dissection.  No CT evidence of right heart strain.    LUNGS AND PLEURA: Mild-to-moderate-sized bilateral pleural effusions with moderate atelectasis in the lower lobes with no central airway obstruction. There is some mild nonspecific mosaic perfusion seen in both lungs which can be associated with changes   of air trapping secondary to small airway inflammation.    MEDIASTINUM/AXILLAE: Normal.    CORONARY ARTERY CALCIFICATION: Advanced.    UPPER ABDOMEN: Increased density in the posterior aspect of the proximal stomach with Hounsfield units at approximately 392. The contrast-filled abdominal aorta shows average Hounsfield units at 180 and the pulmonary arteries at 273, thus this would be   unlikely to represent changes of active GI tract bleeding and I would favor changes of medication. If there is anything clinically to suggest active GI tract bleeding in the stomach, then a GI tract Nuclear Medicine bleeding scan or CTA of the GI tract   could be performed for further evaluation. Mild ascites. Significant fatty infiltration of the liver. Increased density of the gallbladder presumed to represent prior contrast. Stranding of the fat, most marked in the general vicinity of the pancreas   raises the possibility for acute pancreatitis. Mild bilateral gynecomastia.    MUSCULOSKELETAL: Prior postoperative changes of the cervical spine.      Impression    IMPRESSION:  1.  No PE, dissection, or aneurysm.    2.  Increased density in the proximal stomach and I would favor changes of medication rather than active GI tract bleeding. Please refer to above report for details.    3.  Mild-to-moderate-sized bilateral pleural effusions with moderate atelectasis in the lower lobes with no central airway obstruction.    4.  Mild ascites.    5.  Stranding of the fat in the abdomen most marked in the general region of the pancreas which raises the possibility for acute pancreatitis.    6.  Advanced coronary artery calcification.   CT Abdomen  Pelvis w Contrast    Narrative    EXAM: CT ABDOMEN PELVIS W CONTRAST  LOCATION: [facility]  DATE: 07/30/2024    INDICATION: Pancreatitis with necrosis.  COMPARISON: CT abdomen and pelvis on 07/24/2024.  TECHNIQUE: CT scan of the abdomen and pelvis was performed after the injection of Isovue 370, 90 mL intravenously. Multiplanar reformats were obtained. Dose reduction techniques were used.    FINDINGS:   LOWER CHEST: Small bilateral pleural effusions and associated basilar atelectasis/consolidation, new as compared to 07/24/2024.    ABDOMEN/PELVIS:  HEPATOBILIARY: Mild diffuse hypodense appearance of the liver, likely due to underlying hepatic steatosis. No suspicious focal hepatic lesion. No radiodense gallstones.    PANCREAS: Mild decreased enhancement of the pancreatic head and uncinate process and to a lesser extent pancreatic tail, not significantly changed in the head and uncinate process as compared to 07/24/2024 and slightly worsened in the pancreatic tail as   compared to the same study. Significant peripancreatic fat stranding and fluid is again noted.    SPLEEN: No splenomegaly.    ADRENAL GLANDS: No adrenal nodules.    KIDNEYS/BLADDER: Contrast within the renal collecting system, precludes detailed evaluation for kidney stones. No hydronephrosis in either kidney. Small focus of gas within the urinary bladder, could be related to recent catheterization.    BOWEL: No abnormally dilated bowel loops.    PERITONEUM: Small to moderate volume ascites, not significantly changed as compared to 07/24/2024 exam.    PELVIC ORGANS: Unremarkable.    VASCULATURE: Moderate atherosclerotic vascular calcification of the abdominal aorta and iliac arteries.    LYMPH NODES: Multiple prominent upper abdominal and retroperitoneal lymph nodes, indeterminate, could be reactive.    MUSCULOSKELETAL: Multilevel degenerative changes of the spine. No suspicious osseous lesion.      Impression    IMPRESSION:    1.  No significant change in the area of decreased enhancement in the pancreatic head and uncinate process as compared to 07/24/2024 exam, suggesting area of pancreatic necrosis. Mild decreased enhancement of the pancreatic tail, new/worsened as compared   to same study, could also represent area of pancreatic necrosis. Significant peripancreatic fat stranding/fluid, related to patient's history of acute pancreatitis.  2.  Small to moderate volume ascites, not significantly changed as compared to 07/24/2024.  3.  Small bilateral pleural effusions and associated basilar atelectasis/consolidation, new as compared to 07/24/2024.  4.  Hepatic steatosis.   MR Abdomen MRCP w/o & w Contrast    Narrative    EXAM: MR ABDOMEN MRCP W/O and W CONTRAST  LOCATION: [facility]  DATE: 7/31/2024    INDICATION: pancreatitis  COMPARISON: [CT 7/30/2024  TECHNIQUE: Routine MR liver/pancreas protocol including axial and coronal MRCP sequences. 2D and 3D reconstruction performed by MR technologist including MIP reconstruction and slab cholangiograms. If performed with contrast, additional dynamic T1 post   IV contrast images.  CONTRAST: 8.5 ml Gadavist     FINDINGS:     MRCP: Normal intra and extrahepatic bile ducts. No dilatation, stricture, or intraductal stones. No masses. Normal pancreatic duct anatomy. Normal gallbladder.    LIVER: Diffuse steatosis. No focal lesions.    PANCREAS: Diffuse parenchymal edema, peripancreatic free fluid, and inflammatory fat stranding. The parenchyma is diffusely hypoenhancing.    ADDITIONAL FINDINGS: No significant abnormality in the spleen, kidneys, and adrenal glands. Patent hepatic and portal veins. No pseudoaneurysm or thrombus. No adenopathy. Tiny bilateral pleural effusions.      Impression    IMPRESSION:  1.  No biliary dilation or choledocholithiasis.    2.  Acute pancreatitis with diffusely hypoenhancing pancreatic parenchyma, which could reflect parenchymal necrosis.  Continued follow-up recommended. No evidence of thrombus or pseudoaneurysm.    3.  Tiny bilateral pleural effusions.    4.  Hepatic steatosis.       Discharge Orders      Adult Cardiology Sumit Swanson Referral      Reason for your hospital stay    Severe acute pancreatitis -alcohol induced  Alcohol withdrawal     Follow-up and recommended labs and tests     Follow up with primary care provider, Mame Dougherty, within 7 days to evaluate medication change and for hospital follow- up.  The following labs/tests are recommended: WBC, BMP.    Follow up with Gastroenterology, appointment will be made. Outpatient repeat CT in 2 months  Follow up with Cardiology for advanced coronary artery calcification, referral provided  Follow up with inpatient rehab as patient desires    Call our scheduling line at [phone] to make an   appointment, if you do not already have one scheduled     Activity    Your activity upon discharge: activity as tolerated     Diet    Follow this diet upon discharge:       Snacks/Supplements Adult: Ensure Max Protein (bariatric); With Meals      Snacks/Supplements Adult: Special K Bar; Between Meals      Low Fat Diet (up to 50g)     Discharge Medications   Current Discharge Medication List        START taking these medications    Details   atorvastatin (LIPITOR) 40 MG tablet Take 1 tablet (40 mg) by mouth every evening  Qty: 90 tablet, Refills: 1    Associated Diagnoses: Hyperlipidemia LDL goal <70      folic acid (FOLVITE) 1 MG tablet Take 1 tablet (1 mg) by mouth daily  Qty: 90 tablet, Refills: 1    Associated Diagnoses: Alcohol withdrawal syndrome with complication (H)      multivitamin w/minerals (THERA-VIT-M) tablet Take 1 tablet by mouth daily  Qty: 90 tablet, Refills: 1    Associated Diagnoses: Alcohol withdrawal syndrome with complication (H)      ondansetron (ZOFRAN ODT) 4 MG ODT tab Take 1 tablet (4 mg) by mouth every 6 hours as needed for nausea or vomiting  Qty: 10 tablet, Refills: 0     Associated Diagnoses: Nausea      oxyCODONE (ROXICODONE) 5 MG tablet Take 1 tablet (5 mg) by mouth every 6 hours as needed for moderate to severe pain  Qty: 20 tablet, Refills: 0    Associated Diagnoses: Alcohol-induced acute pancreatitis without infection or necrosis      polyethylene glycol (MIRALAX) 17 GM/Dose powder Take 17 g by mouth daily  Qty: 510 g, Refills: 0    Associated Diagnoses: Alcohol-induced acute pancreatitis without infection or necrosis      thiamine (B-1) 100 MG tablet Take 1 tablet (100 mg) by mouth daily  Qty: 90 tablet, Refills: 1    Associated Diagnoses: Alcohol withdrawal syndrome with complication (H)           CONTINUE these medications which have NOT CHANGED    Details   acetaminophen (TYLENOL) 500 MG tablet Take 500-1,000 mg by mouth every 6 hours as needed for mild pain      lisinopril-hydrochlorothiazide (ZESTORETIC) 10-12.5 MG tablet Take 1 tablet by mouth daily  Qty: 30 tablet, Refills: 0      omeprazole (PRILOSEC) 20 MG DR capsule Take 20 mg by mouth daily      traZODone (DESYREL) 50 MG tablet Take 50 mg by mouth nightly as needed for sleep           Allergies   No Known Allergies

## 2024-08-01 NOTE — CONSULTS
"SPIRITUAL HEALTH SERVICES - Brief Note  [facility] P1    Referral Source: routine consult/length of stay    Pt Chandler reported feeling \"fired up and ready for treatment\". He was in a very positive mood and said he is \"trying to get ahead of what alcohol could do to ruin my life\". He spoke about his treatment plans and says that he is eager to check on his house. He was appreciative of the visit and asked for a blessing, which I provided.    Plan: [facility] to follow up as available.    Time spent with patient: 10 min     Diana Regan Mdiv '26    Intern      [facility] available 24/7 for emergent requests/referrals, either by paging the on-call  or by entering an ASAP/STAT consult in Epic (this will also page the on-call ).    "

## 2024-08-01 NOTE — PLAN OF CARE
"  Problem: Adult Inpatient Plan of Care  Goal: Patient-Specific Goal (Individualized)  Description: You can add care plan individualizations to a care plan. Examples of Individualization might be:  \"Parent requests to be called daily at 9am for status\", \"I have a hard time hearing out of my right ear\", or \"Do not touch me to wake me up as it startles  me\".  Outcome: Progressing  Flowsheets (Taken 8/1/2024 0350)  Patient/Family-Specific Goals (Include Timeframe): Pain/nausea control, Maintain safety- no falls, Discharge soon, Sleep >50% of night shift.   Goal Outcome Evaluation:    Pain was well controlled with PRN x 2 during night shift. Nausea was well controlled with PRN x 1.  Safety was maintained with hourly rounding, standard fall precautions, and bed alarm use. Pt remained free of falls.  Pt slept approx. 30% of night shift with minimal interruptions.  Discharge planning in progress.  "

## 2024-08-01 NOTE — PROGRESS NOTES
Care Management Discharge Note    Discharge Date: 08/01/2024       Discharge Disposition:  home    Discharge Services:  outpatient CD tx    Discharge DME:  none    Discharge Transportation:  Family    Private pay costs discussed: Not applicable    Does the patient's insurance plan have a 3 day qualifying hospital stay waiver?  No      Education Provided on the Discharge Plan:    Persons Notified of Discharge Plans: patient- per team   Patient/Family in Agreement with the Plan:      Handoff Referral Completed: Yes    Additional Information:  No needs anticipated from CM at discharge per pt; independent at baseline. Pt to follow up with PCP post discharge if needs arise. Family to transport home.       GENET Lynne

## 2024-08-01 NOTE — PLAN OF CARE
Problem: Adult Inpatient Plan of Care  Goal: Optimal Comfort and Wellbeing  8/1/2024 1600 by María Zuniga RN  Outcome: Adequate for Care Transition  8/1/2024 1540 by María Zuniga RN  Outcome: Progressing  Intervention: Monitor Pain and Promote Comfort  Recent Flowsheet Documentation  Taken 8/1/2024 0827 by María Zuniga RN  Pain Management Interventions: medication (see MAR)  Intervention: Provide Person-Centered Care  Recent Flowsheet Documentation  Taken 8/1/2024 0836 by María Zuniga RN  Trust Relationship/Rapport:   care explained   choices provided   questions answered   Goal Outcome Evaluation:  Pt is discharging, he plans on going to the treatment center from here. His significant other is at bedside and will transport him there. AVS printed and given to pt and all questions answered.

## 2024-08-01 NOTE — PLAN OF CARE
Goal Outcome Evaluation:      Problem: Alcohol Withdrawal  Goal: Alcohol Withdrawal Symptom Control  Intervention: Minimize or Manage Alcohol Withdrawal Symptoms  Recent Flowsheet Documentation  Taken 8/1/2024 0836 by María Zuniga RN  Sensory Stimulation Regulation:   care clustered   lighting decreased   quiet environment promoted  Goal: Optimal Neurologic Function  Intervention: Minimize or Manage Acute Neurologic Symptoms  Recent Flowsheet Documentation  Taken 8/1/2024 0836 by María Zuniga RN  Sensory Stimulation Regulation:   care clustered   lighting decreased   quiet environment promoted  Goal: Readiness for Change Identified  Intervention: Promote Psychosocial Wellbeing  Recent Flowsheet Documentation  Taken 8/1/2024 0836 by María Zuniga RN  Family/Support System Care: self-care encouraged  Intervention: Partner to Facilitate Behavior Change  Recent Flowsheet Documentation  Taken 8/1/2024 0836 by María Zuniga RN  Supportive Measures:   active listening utilized   goal-setting facilitated   relaxation techniques promoted   self-care encouraged   Pt remains alert and oriented x4. Vss, NSR on tele.  walked the hallway multiple times, appearing anxious, but easily redirectable.   His conversation this morning was focus on going to the treatment center.

## 2024-08-01 NOTE — PROGRESS NOTES
[facility] Progress Note     IMPRESSION:  43 yo M PMH alcohol use disorder, primary HTN, CT imaging evidence of CAD, GERD     Presented 7/23/24 for nausea, vomiting, & abdominal pain. Found to have alcohol withdrawal syndrome, acute alcoholic hepatitis, & pancreatitis with imaging evidence of hypo-enhancement concerning for necrosis without sustained systemic inflammation or end-organ injury.    # Alcohol withdrawal  - On CIWA protocol, Thiamine, and folate    # Severe necrotizing pancreatitis  - CT with acute pancreatitis and prominent hypoenhancement at head and uncinate process, possibly could represent necrotizing pancreatitis, wall thickened duodenum and hepatic flexure of colon, borderline left retroperitoneal lymph nodes  - empirically started on meropenem, on LR 50cc/hr currently  - Repeat CT on 7/30 - mild decreased enhancement of pancreatic tail, possibly worsened compared to previous study and could represent area of pancreatic necrosis. Pancreatic head and uncinate process without significant changes. Ongoing, significant peripancreatic fat stranding  - MRCP 7/31 - diffusely hypoenhancing pancreatic parenchyma which could reflect necrosis. No evidence of thrombus, pseudoaneurysm, or obvious pancreatic/biliary leak.          RECOMMENDATIONS:  - Diet: low-fat diet    - Switch to PO pain meds only. If he is able to tolerate pains with this, he can likely be discharged soon.    - Discussed with patient regarding a diagnostic paracentesis to evaluate ascites fluid in the abdomen.  I suspect ascites is related to alcohol use and pancreatitis however the tap could be helpful in evaluating SAAG for possible portal HTN, infections/sbp, lipase/amylase in ascitic fluid, etc.     Given that he appears to be clinically improving, we will hold off on this. However, if symptoms should worsen or he has delayed recovery, would recommend the paracentesis.     - Will need repeat CT in ~2 months    - Miralax  "for constipation.      15 minutes of total time was spent providing patient care, including patient evaluation, reviewing documentation/test results, and     Hugo Schmidt PA-C  [facility]  [phone]  ________________________________________________________________________      SUBJECTIVE:    Patient states he is doing great this morning. He has mild left sided abdominal discomfort but overall feels well. He wants to go home.   He is tolerating diet.   Vitals still with tachycardia and hypertension. No fevers.        OBJECTIVE:  BP (!) 164/98 (BP Location: Left arm)   Pulse 109   Temp 98  F (36.7  C) (Oral)   Resp 18   Ht 1.88 m (6' 2\")   Wt 84.1 kg (185 lb 6.5 oz)   SpO2 97%   BMI 23.80 kg/m    Temp (24hrs), Av.4  F (37.4  C), Min:98.2  F (36.8  C), Max:101.5  F (38.6  C)    Patient Vitals for the past 72 hrs:   Weight   24 1900 84.1 kg (185 lb 6.5 oz)   24 0400 85 kg (187 lb 8 oz)       Intake/Output Summary (Last 24 hours) at 2024 0904  Last data filed at 2024 0600  Gross per 24 hour   Intake 2650.05 ml   Output 5580 ml   Net -2929.95 ml        PHYSICAL EXAM  GEN: Alert, oriented x3, communicative and in NAD.    LYMPH: No LAD noted.  HRT: RRR  LUNGS: CTA  ABD:  ND, +BS, + pain to palpation, no rebound, no HSM.  SKIN: No rash, jaundice or spider angiomata      LABS:  I have reviewed the patient's new clinical lab results:     Recent Labs   Lab Test 24  0720 24  0450 24  0854   WBC 10.5 9.7 8.9   HGB 12.7* 12.0* 13.4   MCV 95 93 95    304 227     Recent Labs   Lab Test 24  0720 24  1038 24  0450 24  1437 24  0854   POTASSIUM 3.7 4.1 3.4   < > 2.9*   CHLORIDE 98  --  98  --  99   CO2   --  24  --     BUN 8.3  --  7.4  --  8.7   CR 0.71  --  0.61*  --  0.57*   ANIONGAP 10  --  11  --  12    < > = values in this interval not displayed.     Recent Labs   Lab Test 24  0711 24  0419 " 07/25/24  1025 07/25/24  0453 07/24/24  0801 07/24/24  0003 04/10/24  1556 02/24/22  0637 02/23/22  1335   ALBUMIN 2.9* 2.8*  --  3.4* 4.2 4.5 4.7   < >  --    BILITOTAL 0.8 1.1  --  1.1 0.6 0.5 0.4   < >  --    ALT 35 44  --  90* 163* 203* 176*   < >  --    AST 35 46*  --  84* 200* 274* 272*   < >  --    PROTEIN  --   --  50*  --   --   --   --   --  Negative   LIPASE  --   --   --   --  1,425* 1,207* 117*   < >  --     < > = values in this interval not displayed.         IMAGING:  Reviewed  EXAM: MR ABDOMEN MRCP W/O and W CONTRAST  LOCATION: [facility]  DATE: 7/31/2024     INDICATION: pancreatitis  COMPARISON: [CT 7/30/2024  TECHNIQUE: Routine MR liver/pancreas protocol including axial and coronal MRCP sequences. 2D and 3D reconstruction performed by MR technologist including MIP reconstruction and slab cholangiograms. If performed with contrast, additional dynamic T1 post   IV contrast images.  CONTRAST: 8.5 ml Gadavist      FINDINGS:      MRCP: Normal intra and extrahepatic bile ducts. No dilatation, stricture, or intraductal stones. No masses. Normal pancreatic duct anatomy. Normal gallbladder.     LIVER: Diffuse steatosis. No focal lesions.     PANCREAS: Diffuse parenchymal edema, peripancreatic free fluid, and inflammatory fat stranding. The parenchyma is diffusely hypoenhancing.     ADDITIONAL FINDINGS: No significant abnormality in the spleen, kidneys, and adrenal glands. Patent hepatic and portal veins. No pseudoaneurysm or thrombus. No adenopathy. Tiny bilateral pleural effusions.                                                                      IMPRESSION:  1.  No biliary dilation or choledocholithiasis.     2.  Acute pancreatitis with diffusely hypoenhancing pancreatic parenchyma, which could reflect parenchymal necrosis. Continued follow-up recommended. No evidence of thrombus or pseudoaneurysm.     3.  Tiny bilateral pleural effusions.     4.  Hepatic steatosis.

## 2024-08-12 LAB
ATRIAL RATE - MUSE: 125 BPM
DIASTOLIC BLOOD PRESSURE - MUSE: NORMAL MMHG
INTERPRETATION ECG - MUSE: NORMAL
P AXIS - MUSE: 54 DEGREES
PR INTERVAL - MUSE: 148 MS
QRS DURATION - MUSE: 88 MS
QT - MUSE: 322 MS
QTC - MUSE: 464 MS
R AXIS - MUSE: 20 DEGREES
SYSTOLIC BLOOD PRESSURE - MUSE: NORMAL MMHG
T AXIS - MUSE: 55 DEGREES
VENTRICULAR RATE- MUSE: 125 BPM

## 2024-11-27 NOTE — PHARMACY-ADMISSION MEDICATION HISTORY
Pharmacy Note - Admission Medication History    Pertinent Provider Information: none     ______________________________________________________________________    Prior To Admission (PTA) med list completed and updated in EMR.       PTA Med List   Medication Sig Last Dose     atorvastatin (LIPITOR) 40 MG tablet Take 40 mg by mouth daily  2/23/2022 at am     lisinopril-hydrochlorothiazide (PRINZIDE,ZESTORETIC) 10-12.5 mg per tablet [LISINOPRIL-HYDROCHLOROTHIAZIDE (PRINZIDE,ZESTORETIC) 10-12.5 MG PER TABLET] Take 1 tablet by mouth daily. 2/23/2022 at am     ondansetron (ZOFRAN) 4 MG tablet Take 4 mg by mouth every 6 hours as needed for nausea 2/22/2022       Information source(s): Patient, Clinic records and [facility]  Method of interview communication: in-person    Summary of Changes to PTA Med List  New: Zofran  Discontinued: none  Changed: none    Patient was asked about OTC/herbal products specifically.  PTA med list reflects this.    In the past week, patient estimated taking medication this percent of the time:  greater than 90%.    Allergies were reviewed, assessed, and updated with the patient.      Patient does not use any multi-dose medications prior to admission.    The information provided in this note is only as accurate as the sources available at the time of the update(s).    Thank you for the opportunity to participate in the care of this patient.    Emre Almeida [facility]  2/23/2022 9:40 PM    
none...
No

## 2025-08-09 ENCOUNTER — HEALTH MAINTENANCE LETTER (OUTPATIENT)
Age: 45
End: 2025-08-09

## (undated) RX ORDER — LIDOCAINE HYDROCHLORIDE 20 MG/ML
JELLY TOPICAL
Status: DISPENSED
Start: 2024-07-24